# Patient Record
Sex: FEMALE | Race: WHITE | NOT HISPANIC OR LATINO | Employment: OTHER | URBAN - METROPOLITAN AREA
[De-identification: names, ages, dates, MRNs, and addresses within clinical notes are randomized per-mention and may not be internally consistent; named-entity substitution may affect disease eponyms.]

---

## 2017-03-02 ENCOUNTER — HOSPITAL ENCOUNTER (EMERGENCY)
Facility: HOSPITAL | Age: 82
Discharge: HOME | End: 2017-03-02
Attending: EMERGENCY MEDICINE
Payer: MEDICARE

## 2017-03-02 VITALS
TEMPERATURE: 98 F | HEIGHT: 65 IN | DIASTOLIC BLOOD PRESSURE: 66 MMHG | HEART RATE: 74 BPM | RESPIRATION RATE: 16 BRPM | WEIGHT: 110 LBS | SYSTOLIC BLOOD PRESSURE: 144 MMHG | BODY MASS INDEX: 18.33 KG/M2 | OXYGEN SATURATION: 96 %

## 2017-03-02 DIAGNOSIS — K64.4 EXTERNAL HEMORRHOID, BLEEDING: ICD-10-CM

## 2017-03-02 DIAGNOSIS — K62.5 BRBPR (BRIGHT RED BLOOD PER RECTUM): Primary | ICD-10-CM

## 2017-03-02 LAB
ALBUMIN SERPL BCP-MCNC: 3.9 G/DL
ALP SERPL-CCNC: 64 U/L
ALT SERPL W/O P-5'-P-CCNC: 13 U/L
ANION GAP SERPL CALC-SCNC: 9 MMOL/L
AST SERPL-CCNC: 26 U/L
BASOPHILS # BLD AUTO: 0.04 K/UL
BASOPHILS NFR BLD: 0.6 %
BILIRUB SERPL-MCNC: 0.3 MG/DL
BUN SERPL-MCNC: 16 MG/DL
CALCIUM SERPL-MCNC: 9.8 MG/DL
CHLORIDE SERPL-SCNC: 103 MMOL/L
CO2 SERPL-SCNC: 26 MMOL/L
CREAT SERPL-MCNC: 1 MG/DL
DIFFERENTIAL METHOD: NORMAL
EOSINOPHIL # BLD AUTO: 0.2 K/UL
EOSINOPHIL NFR BLD: 2.5 %
ERYTHROCYTE [DISTWIDTH] IN BLOOD BY AUTOMATED COUNT: 13 %
EST. GFR  (AFRICAN AMERICAN): 59 ML/MIN/1.73 M^2
EST. GFR  (NON AFRICAN AMERICAN): 51 ML/MIN/1.73 M^2
GLUCOSE SERPL-MCNC: 94 MG/DL
HCT VFR BLD AUTO: 44.7 %
HGB BLD-MCNC: 15.3 G/DL
INR PPP: 1
LYMPHOCYTES # BLD AUTO: 1.8 K/UL
LYMPHOCYTES NFR BLD: 28.5 %
MCH RBC QN AUTO: 29.5 PG
MCHC RBC AUTO-ENTMCNC: 34.2 %
MCV RBC AUTO: 86 FL
MONOCYTES # BLD AUTO: 0.7 K/UL
MONOCYTES NFR BLD: 10.2 %
NEUTROPHILS # BLD AUTO: 3.7 K/UL
NEUTROPHILS NFR BLD: 58 %
PLATELET # BLD AUTO: 233 K/UL
PMV BLD AUTO: 9.3 FL
POTASSIUM SERPL-SCNC: 3.8 MMOL/L
PROT SERPL-MCNC: 7.9 G/DL
PROTHROMBIN TIME: 10.3 SEC
RBC # BLD AUTO: 5.18 M/UL
SODIUM SERPL-SCNC: 138 MMOL/L
WBC # BLD AUTO: 6.36 K/UL

## 2017-03-02 PROCEDURE — 85610 PROTHROMBIN TIME: CPT

## 2017-03-02 PROCEDURE — 80053 COMPREHEN METABOLIC PANEL: CPT

## 2017-03-02 PROCEDURE — 99283 EMERGENCY DEPT VISIT LOW MDM: CPT

## 2017-03-02 PROCEDURE — 85025 COMPLETE CBC W/AUTO DIFF WBC: CPT

## 2017-03-02 RX ORDER — DOCUSATE SODIUM 100 MG/1
100 CAPSULE, LIQUID FILLED ORAL DAILY
Qty: 30 CAPSULE | Refills: 0 | Status: SHIPPED | OUTPATIENT
Start: 2017-03-02 | End: 2017-04-01

## 2017-03-02 RX ORDER — HYDROCHLOROTHIAZIDE 25 MG/1
25 TABLET ORAL DAILY
Status: ON HOLD | COMMUNITY
End: 2020-02-23 | Stop reason: HOSPADM

## 2017-03-02 NOTE — ED PROVIDER NOTES
"Encounter Date: 3/2/2017    SCRIBE #1 NOTE: I, Valerie Oviedo, am scribing for, and in the presence of,  Andres Lopez MD. I have scribed the following portions of the note - Other sections scribed: HPI, ROS.       History     Chief Complaint   Patient presents with    Rectal Bleeding     " I have been having bleeding w/ my bm since yesterday." Light red. Denies pain, n/v      Review of patient's allergies indicates:  No Known Allergies  HPI Comments: CC: Rectal Bleeding     HPI: This patient is a 86 y.o. F with no medical history who presents to ED c/o rectal bleeding since yesterday. Patient is unsure whether bleeding is actually rectal and thinks that it could be vaginal bleeding. Blood appears on tissue when she wipes. Patient denies rectal pain, vaginal pain, dizziness, weakness, nausea, constipation, and diarrhea. Denies history of prior episode. No known allergies.     The history is provided by the patient and a relative. No  was used.     No past medical history on file.  No past surgical history on file.  No family history on file.  Social History   Substance Use Topics    Smoking status: Not on file    Smokeless tobacco: Not on file    Alcohol use Not on file     Review of Systems   Constitutional: Negative for fever.   HENT: Negative for sore throat.    Respiratory: Negative for shortness of breath.    Cardiovascular: Negative for chest pain.   Gastrointestinal: Positive for anal bleeding. Negative for constipation, diarrhea, nausea and vomiting.   Genitourinary: Positive for vaginal bleeding. Negative for dysuria and vaginal pain.   Musculoskeletal: Negative for back pain.   Skin: Negative for rash.   Neurological: Negative for dizziness and weakness.   Hematological: Does not bruise/bleed easily.       Physical Exam   Initial Vitals   BP Pulse Resp Temp SpO2   03/02/17 1256 03/02/17 1256 03/02/17 1256 03/02/17 1256 03/02/17 1256   151/69 78 16 98.4 °F (36.9 °C) 96 % "     Physical Exam  Nursing note and vitals reviewed.  Constitutional: Nontoxic appearing elderly female in no obvious distress.  HENT:    Head: NC/AT    Eyes: Conjunctivae normal.   (-) scleral icterus              Mouth/Throat: MMM.     Neck: Neck supple, normal rom.   Cardiovascular: RRR   Pulmonary/Chest: CTAB   Abdominal: Soft. ND/NT w/o guarding or rebound.   (-) CVA tenderness.  Rectal:  Small bleeding external hemorrhoids.   (-) Anal fissures  Musculoskeletal: FROM of all major joints. No extremity edema or tenderness.  Neurological: A&Ox4, Normal speech.  No focal motor deficits.  Normal gait  Skin: Skin is warm and dry.   Psychiatric: normal mood and affect.      ED Course   Procedures  Labs Reviewed   CBC W/ AUTO DIFFERENTIAL   PROTIME-INR   COMPREHENSIVE METABOLIC PANEL             Medical Decision Making:   History:   I obtained history from: someone other than patient.  Old Medical Records: I decided to obtain old medical records.  Clinical Tests:   Lab Tests: Reviewed and Ordered    Differential diagnosis:   Initial differential diagnosis includes but is not limited to... coagulopathy, lower gi bleed including hemorrhoids (internal vs external), anal fissure, diverticulosis, diverticulitis, AV malformation; upper GI bleeding including gastritis, peptic ulcer disease, esophageal varices.     Additional Medical Decision Making:   Urgent evaluation of an 86-year-old female with history of hypertension who presents emergent department complaining of bright red blood per rectum for the past 24 hours.  She denies any other complaints including generalized weakness, dizziness, nausea/vomiting, abdominal pain, and/or urinary symptoms.   On evaluation, her abdomen is soft and nontender.  She has a small bleeding external hemorrhoid.  VS reassuring.  Basic labs wnls including an H&H 15.3/44.3.  She has been placed on Colace and has been advised to follow-up with both her primary care physician as well as  gastroenterology ASAP.  Return instructions discussed prior to discharge.              Scribe Attestation:   Scribe #1: I performed the above scribed service and the documentation accurately describes the services I performed. I attest to the accuracy of the note.    Attending Attestation:           Physician Attestation for Scribe:  Physician Attestation Statement for Scribe #1: I, Andres Lopez MD, reviewed documentation, as scribed by Valerie Oviedo in my presence, and it is both accurate and complete.                 ED Course     Clinical Impression:   The primary encounter diagnosis was BRBPR (bright red blood per rectum). A diagnosis of External hemorrhoid, bleeding was also pertinent to this visit.    Disposition:   Disposition: Discharged       Andres Lopez MD  03/02/17 8188

## 2017-03-02 NOTE — ED TRIAGE NOTES
Arrive via personal transportation. Pt states she is bleeding from her vagina. Started yesterday, no pain.

## 2020-02-22 ENCOUNTER — HOSPITAL ENCOUNTER (OUTPATIENT)
Facility: HOSPITAL | Age: 85
Discharge: HOME | End: 2020-02-23
Attending: EMERGENCY MEDICINE | Admitting: FAMILY MEDICINE
Payer: MEDICARE

## 2020-02-22 DIAGNOSIS — R55 SYNCOPE: ICD-10-CM

## 2020-02-22 DIAGNOSIS — F03.91 DEMENTIA WITH BEHAVIORAL DISTURBANCE, UNSPECIFIED DEMENTIA TYPE: Primary | ICD-10-CM

## 2020-02-22 DIAGNOSIS — R74.8 ELEVATED LIVER ENZYMES: ICD-10-CM

## 2020-02-22 DIAGNOSIS — D69.6 THROMBOCYTOPENIA: ICD-10-CM

## 2020-02-22 DIAGNOSIS — R56.9 SEIZURE-LIKE ACTIVITY: ICD-10-CM

## 2020-02-22 LAB
BACTERIA #/AREA URNS HPF: ABNORMAL /HPF
BASOPHILS # BLD AUTO: 0.04 K/UL (ref 0–0.2)
BASOPHILS NFR BLD: 0.7 % (ref 0–1.9)
BILIRUB UR QL STRIP: NEGATIVE
CLARITY UR: ABNORMAL
COLOR UR: YELLOW
DIFFERENTIAL METHOD: ABNORMAL
EOSINOPHIL # BLD AUTO: 0.1 K/UL (ref 0–0.5)
EOSINOPHIL NFR BLD: 2 % (ref 0–8)
ERYTHROCYTE [DISTWIDTH] IN BLOOD BY AUTOMATED COUNT: 13.7 % (ref 11.5–14.5)
GLUCOSE UR QL STRIP: NEGATIVE
HCT VFR BLD AUTO: 37.4 % (ref 37–48.5)
HGB BLD-MCNC: 12.3 G/DL (ref 12–16)
HGB UR QL STRIP: ABNORMAL
HYALINE CASTS #/AREA URNS LPF: 0 /LPF
IMM GRANULOCYTES # BLD AUTO: 0.02 K/UL (ref 0–0.04)
IMM GRANULOCYTES NFR BLD AUTO: 0.3 % (ref 0–0.5)
KETONES UR QL STRIP: ABNORMAL
LEUKOCYTE ESTERASE UR QL STRIP: ABNORMAL
LYMPHOCYTES # BLD AUTO: 1.3 K/UL (ref 1–4.8)
LYMPHOCYTES NFR BLD: 22.1 % (ref 18–48)
MAGNESIUM SERPL-MCNC: 2 MG/DL (ref 1.6–2.6)
MCH RBC QN AUTO: 30.1 PG (ref 27–31)
MCHC RBC AUTO-ENTMCNC: 32.9 G/DL (ref 32–36)
MCV RBC AUTO: 92 FL (ref 82–98)
MICROSCOPIC COMMENT: ABNORMAL
MONOCYTES # BLD AUTO: 0.6 K/UL (ref 0.3–1)
MONOCYTES NFR BLD: 10 % (ref 4–15)
NEUTROPHILS # BLD AUTO: 3.8 K/UL (ref 1.8–7.7)
NEUTROPHILS NFR BLD: 64.9 % (ref 38–73)
NITRITE UR QL STRIP: NEGATIVE
NRBC BLD-RTO: 0 /100 WBC
PH UR STRIP: 6 [PH] (ref 5–8)
PLATELET # BLD AUTO: 86 K/UL (ref 150–350)
PMV BLD AUTO: 10.6 FL (ref 9.2–12.9)
PROT UR QL STRIP: ABNORMAL
RBC # BLD AUTO: 4.08 M/UL (ref 4–5.4)
RBC #/AREA URNS HPF: 30 /HPF (ref 0–4)
SP GR UR STRIP: 1.02 (ref 1–1.03)
SQUAMOUS #/AREA URNS HPF: 4 /HPF
TROPONIN I SERPL DL<=0.01 NG/ML-MCNC: 0.02 NG/ML (ref 0–0.03)
URN SPEC COLLECT METH UR: ABNORMAL
UROBILINOGEN UR STRIP-ACNC: ABNORMAL EU/DL
WBC # BLD AUTO: 5.92 K/UL (ref 3.9–12.7)
WBC #/AREA URNS HPF: 4 /HPF (ref 0–5)

## 2020-02-22 PROCEDURE — 80053 COMPREHEN METABOLIC PANEL: CPT

## 2020-02-22 PROCEDURE — 81000 URINALYSIS NONAUTO W/SCOPE: CPT

## 2020-02-22 PROCEDURE — 93010 ELECTROCARDIOGRAM REPORT: CPT | Mod: ,,, | Performed by: INTERNAL MEDICINE

## 2020-02-22 PROCEDURE — 96361 HYDRATE IV INFUSION ADD-ON: CPT

## 2020-02-22 PROCEDURE — G0378 HOSPITAL OBSERVATION PER HR: HCPCS

## 2020-02-22 PROCEDURE — 63600175 PHARM REV CODE 636 W HCPCS: Performed by: EMERGENCY MEDICINE

## 2020-02-22 PROCEDURE — 84484 ASSAY OF TROPONIN QUANT: CPT

## 2020-02-22 PROCEDURE — 85025 COMPLETE CBC W/AUTO DIFF WBC: CPT

## 2020-02-22 PROCEDURE — 99285 EMERGENCY DEPT VISIT HI MDM: CPT | Mod: 25

## 2020-02-22 PROCEDURE — 83735 ASSAY OF MAGNESIUM: CPT

## 2020-02-22 PROCEDURE — 93005 ELECTROCARDIOGRAM TRACING: CPT

## 2020-02-22 PROCEDURE — 93010 EKG 12-LEAD: ICD-10-PCS | Mod: ,,, | Performed by: INTERNAL MEDICINE

## 2020-02-22 PROCEDURE — 96360 HYDRATION IV INFUSION INIT: CPT

## 2020-02-22 RX ADMIN — SODIUM CHLORIDE 1000 ML: 0.9 INJECTION, SOLUTION INTRAVENOUS at 07:02

## 2020-02-23 VITALS
TEMPERATURE: 97 F | RESPIRATION RATE: 18 BRPM | DIASTOLIC BLOOD PRESSURE: 74 MMHG | HEIGHT: 64 IN | SYSTOLIC BLOOD PRESSURE: 124 MMHG | WEIGHT: 97.44 LBS | HEART RATE: 58 BPM | OXYGEN SATURATION: 97 % | BODY MASS INDEX: 16.64 KG/M2

## 2020-02-23 PROBLEM — F03.90 DEMENTIA: Status: ACTIVE | Noted: 2020-02-23

## 2020-02-23 LAB
ALBUMIN SERPL BCP-MCNC: 3.4 G/DL (ref 3.5–5.2)
ALP SERPL-CCNC: 111 U/L (ref 55–135)
ALT SERPL W/O P-5'-P-CCNC: 32 U/L (ref 10–44)
ANION GAP SERPL CALC-SCNC: 11 MMOL/L (ref 8–16)
AST SERPL-CCNC: 45 U/L (ref 10–40)
BILIRUB SERPL-MCNC: 0.6 MG/DL (ref 0.1–1)
BUN SERPL-MCNC: 19 MG/DL (ref 8–23)
CALCIUM SERPL-MCNC: 9 MG/DL (ref 8.7–10.5)
CHLORIDE SERPL-SCNC: 104 MMOL/L (ref 95–110)
CK SERPL-CCNC: 22 U/L (ref 20–180)
CO2 SERPL-SCNC: 25 MMOL/L (ref 23–29)
CREAT SERPL-MCNC: 1.1 MG/DL (ref 0.5–1.4)
EST. GFR  (AFRICAN AMERICAN): 52 ML/MIN/1.73 M^2
EST. GFR  (NON AFRICAN AMERICAN): 45 ML/MIN/1.73 M^2
GLUCOSE SERPL-MCNC: 130 MG/DL (ref 70–110)
POTASSIUM SERPL-SCNC: 4 MMOL/L (ref 3.5–5.1)
PROT SERPL-MCNC: 6.5 G/DL (ref 6–8.4)
SODIUM SERPL-SCNC: 140 MMOL/L (ref 136–145)

## 2020-02-23 PROCEDURE — 99225 PR SUBSEQUENT OBSERVATION CARE,LEVEL II: CPT | Mod: ,,, | Performed by: PSYCHIATRY & NEUROLOGY

## 2020-02-23 PROCEDURE — S0166 INJ OLANZAPINE 2.5MG: HCPCS | Performed by: NURSE PRACTITIONER

## 2020-02-23 PROCEDURE — 94761 N-INVAS EAR/PLS OXIMETRY MLT: CPT

## 2020-02-23 PROCEDURE — 25000003 PHARM REV CODE 250: Performed by: NURSE PRACTITIONER

## 2020-02-23 PROCEDURE — G0378 HOSPITAL OBSERVATION PER HR: HCPCS

## 2020-02-23 PROCEDURE — 36415 COLL VENOUS BLD VENIPUNCTURE: CPT

## 2020-02-23 PROCEDURE — 99225 PR SUBSEQUENT OBSERVATION CARE,LEVEL II: ICD-10-PCS | Mod: ,,, | Performed by: PSYCHIATRY & NEUROLOGY

## 2020-02-23 PROCEDURE — 96372 THER/PROPH/DIAG INJ SC/IM: CPT

## 2020-02-23 PROCEDURE — 82550 ASSAY OF CK (CPK): CPT

## 2020-02-23 RX ORDER — OLANZAPINE 10 MG/2ML
5 INJECTION, POWDER, FOR SOLUTION INTRAMUSCULAR ONCE
Status: COMPLETED | OUTPATIENT
Start: 2020-02-23 | End: 2020-02-23

## 2020-02-23 RX ORDER — SODIUM CHLORIDE 0.9 % (FLUSH) 0.9 %
10 SYRINGE (ML) INJECTION
Status: DISCONTINUED | OUTPATIENT
Start: 2020-02-23 | End: 2020-02-23 | Stop reason: HOSPADM

## 2020-02-23 RX ADMIN — OLANZAPINE 5 MG: 10 INJECTION, POWDER, FOR SOLUTION INTRAMUSCULAR at 09:02

## 2020-02-23 NOTE — PLAN OF CARE
Problem: Fall Injury Risk  Goal: Absence of Fall and Fall-Related Injury  Intervention: Identify and Manage Contributors to Fall Injury Risk  Flowsheets (Taken 2/23/2020 0633)  Self-Care Promotion: independence encouraged  Medication Review/Management: medications reviewed  Intervention: Promote Injury-Free Environment  Flowsheets (Taken 2/23/2020 0633)  Safety Promotion/Fall Prevention: bed alarm set; family to remain at bedside; side rails raised x 2; room near unit station; instructed to call staff for mobility  Environmental Safety Modification: clutter free environment maintained; room organization consistent; room near unit station     Problem: Adult Inpatient Plan of Care  Goal: Plan of Care Review  Flowsheets (Taken 2/23/2020 0633)  Plan of Care Reviewed With: patient; family (niece)  Goal: Absence of Hospital-Acquired Illness or Injury  Intervention: Identify and Manage Fall Risk  Flowsheets (Taken 2/23/2020 0633)  Safety Promotion/Fall Prevention: bed alarm set; family to remain at bedside; side rails raised x 2; room near unit station; instructed to call staff for mobility  Intervention: Prevent VTE (venous thromboembolism)  Flowsheets (Taken 2/23/2020 0633)  VTE Prevention/Management: ROM (passive) performed  Goal: Optimal Comfort and Wellbeing  Intervention: Provide Person-Centered Care  Flowsheets (Taken 2/23/2020 0633)  Trust Relationship/Rapport: thoughts/feelings acknowledged     Problem: Thought Process Alteration  Goal: Optimal Thought Clarity  Intervention: Minimize Safety Risk and Altered Thought  Flowsheets (Taken 2/23/2020 0633)  Reorientation Measures: calendar in view; clock in view; reorientation provided  Sensory Stimulation Regulation: care clustered; lighting decreased; music/television provided for relaxation  Safety/Security Measures: bed alarm set

## 2020-02-23 NOTE — ED TRIAGE NOTES
Pt arrives with c/o confusion. No family at bedside. Pt is disoriented x4,, cant tell me her name, , situation, place,year. Pt denies headache, dizziness, pain.

## 2020-02-23 NOTE — PROGRESS NOTES
[facility] CASE MANAGEMENT                  WRITTEN DISCHARGE INFORMATION      APPOINTMENTS AND RESOURCES TO HELP YOU MANAGE YOUR CARE AT HOME BASED ON YOUR PREFERENCES:  (If an appointment is not scheduled for you when you leave the hospital, call your doctor to schedule a follow up visit within a week)    Follow-up Information     Thai Her MD.    Specialty:  General Surgery  Why:  Please call to schedule your PCP appt in 1 week   Contact information:  [address]  [facility] [address]  [phone]                   Healthy Living Instructions to HELP MANAGE YOUR CARE AT HOME:  Things You are responsible for:  1.    Getting your prescriptions filled   2.    Taking your medications as directed, DO NOT MISS ANY DOSES!  3.    Following the diet and exercise recommended by your doctor  4.    Going to your follow-up doctor appointment. This is important because it allows the doctor to monitor your progress and determine if any changes need to made to your treatment plan.  5. If you have any questions about MANAGING YOUR CARE AT HOME Call the Nurse Care Line for 24/7 Assistance [phone]       Please answer any calls you may receive from [facility]. We want to continue to support you as you manage your healthcare needs. [facility] is happy to have the opportunity to serve you.      Thank you for choosing [facility] for your healthcare needs!  Your [facility] Case Management Team,

## 2020-02-23 NOTE — ASSESSMENT & PLAN NOTE
Obtain EEG in the morning.  Check orthostatics Q shift.  Obtain cardiac echo.  Obtain frequent neurological checks.  Nothing focal on exam right now and labs are unrevealing EKG is nonischemic.  Observe overnight on telemetry monitoring.

## 2020-02-23 NOTE — ASSESSMENT & PLAN NOTE
At baseline dementia is moderate to advanced patient is still able to ambulate but does not recognize her family members anymore.

## 2020-02-23 NOTE — ED PROVIDER NOTES
Encounter Date: 2/22/2020    SCRIBE #1 NOTE: I, Kate Anders, am scribing for, and in the presence of,  Valeriano Ewing MD. I have scribed the following portions of the note - Other sections scribed: HPI,ROS,PE.       History     Chief Complaint   Patient presents with    Loss of Consciousness     EMS reports pt had a syncopal episode just pta witnessed by family. no trauma. pt alert and confused at this time.      CC: Loss of Consciousness    HPI: This is a 89 y.o.female patient, with no PMHx, presenting to the ED with a complaint of loss of consciousness, that occurred earlier today. Per EMS, patient had a witnessed syncopal episode by family. No family present at bedside. No known drug allergies.    Per relative, patient had similar symptoms 2 weeks ago. They report patient has a history of seizure like activity.    HPI limited since patient cannot participate in history taking.    The history is provided by the EMS personnel.     Review of patient's allergies indicates:  No Known Allergies  Past Medical History:   Diagnosis Date    Dementia      History reviewed. No pertinent surgical history.  History reviewed. No pertinent family history.  Social History     Tobacco Use    Smoking status: Former Smoker   Substance Use Topics    Alcohol use: Not Currently    Drug use: Not Currently     Review of Systems   Unable to perform ROS: Dementia       Physical Exam     Initial Vitals [02/22/20 1859]   BP Pulse Resp Temp SpO2   118/74 68 16 98.8 °F (37.1 °C) (!) 93 %      MAP       --         Physical Exam  The patient was examined specifically for the following:   General:No significant distress, Good color, Warm and dry. Head and neck:Scalp atraumatic, Neck supple. Neurological:Appropriate conversation, Gross motor deficits. Eyes:Conjugate gaze, Clear corneas. ENT: No epistaxis. Cardiac: Regular rate and rhythm, Grossly normal heart tones. Pulmonary: Wheezing, Rales. Gastrointestinal: Abdominal tenderness,  Abdominal distention. Musculoskeletal: Extremity deformity, Apparent pain with range of motion of the joints. Skin: Rash.   The findings on examination were normal except for the following:  This patient appears to be cheerfully demented.  Cranial nerves motor and sensory examination appeared to be more normal. The lungs are clear.  The heart tones are normal.  The abdomen is soft.  Oxygen saturations are 93% on room air on arrival.  ED Course   Procedures  Labs Reviewed   COMPREHENSIVE METABOLIC PANEL - Abnormal; Notable for the following components:       Result Value    Glucose 130 (*)     Albumin 3.4 (*)     AST 45 (*)     eGFR if  52 (*)     eGFR if non  45 (*)     All other components within normal limits   CBC W/ AUTO DIFFERENTIAL - Abnormal; Notable for the following components:    Platelets 86 (*)     All other components within normal limits   URINALYSIS - Abnormal; Notable for the following components:    Appearance, UA Hazy (*)     Protein, UA 1+ (*)     Ketones, UA 1+ (*)     Occult Blood UA 3+ (*)     Urobilinogen, UA 4.0-6.0 (*)     Leukocytes, UA 1+ (*)     All other components within normal limits   URINALYSIS MICROSCOPIC - Abnormal; Notable for the following components:    RBC, UA 30 (*)     All other components within normal limits   TROPONIN I   MAGNESIUM     EKG Readings: (Independently Interpreted)   This patient is in a normal sinus rhythm with a heart rate of 62.  There are occasional PVCs.  There are nonspecific ST segment and T-wave changes.  The patient has borderline left axis deviation.  There is poor R-wave progression across the precordium.  There are diffuse T-wave changes.  There are some nonspecific ST segment changes.  There is no evidence of acute myocardial infarction or malignant arrhythmia.     ECG Results          EKG 12-lead (Final result)  Result time 02/23/20 15:05:15    Final result by Interface, Lab In [facility] (02/23/20 15:05:15)                  Narrative:    Test Reason : R55,    Vent. Rate : 062 BPM     Atrial Rate : 062 BPM     P-R Int : 156 ms          QRS Dur : 080 ms      QT Int : 418 ms       P-R-T Axes : 071 007 075 degrees     QTc Int : 424 ms    Sinus rhythm with occasional Premature ventricular complexes  Otherwise normal ECG  When compared with ECG of 30-OCT-2012 10:44,  Significant changes have occurred  Confirmed by Wilver Ndiaye MD ([id number]) on 2/23/2020 3:05:02 PM    Referred By: ADITI   SELF           Confirmed By:Wilver Ndiaye MD                            Imaging Results          CT Head Without Contrast (Final result)  Result time 02/22/20 22:02:28    Final result by Robyn Buchanan MD (02/22/20 22:02:28)                 Impression:      No acute intracranial abnormality detected.  Moderate cerebral atrophy and chronic small vessel ischemic changes.      Electronically signed by: Robyn Buchanan  Date:    02/22/2020  Time:    22:02             Narrative:    EXAMINATION:  CT OF THE HEAD WITHOUT    CLINICAL HISTORY:  Unspecified convulsionsSeizures new or progressive;    TECHNIQUE:  5 mm unenhanced axial images were obtained from the skull base to the vertex.    COMPARISON:  10/30/2012    FINDINGS:  The ventricles, basal cisterns, and cortical sulci are within normal limits for patient's stated age. There is no acute intracranial hemorrhage, territorial infarct or mass effect, or midline shift.  There is dilatation of the temporal horns.  In the visualized paranasal sinuses, there is mild left sphenoid sinus mucoperiosteal thickening.                              Medical decision making:  Given the above this patient presents to the emergency room with a history of syncope versus seizure today.  The patient has similar event 2 weeks ago.  I have not heard the event described by family member.  They are not here now.  The patient is apparently at neurologic baseline.  I believe she has dementia.   Given the above laboratory  evaluation is essentially unremarkable.  I wait the results of the CT scan of the head.  I will sign this interpretation out to .   The patient is alert and bright and in no distress. I would not treat for seizures.  It is unclear that that is what is happening.  I will refer the patient to follow up with primary care.                Scribe Attestation:   Scribe #1: I performed the above scribed service and the documentation accurately describes the services I performed. I attest to the accuracy of the note.            ED Course as of Feb 25 1732   Sat Feb 22, 2020   1786 Discussed the patient's presentation, exam, and workup with Dr. Larry. The patient will be placed in observation.    [LP]      ED Course User Index  [LP] Anibal Pierre III, MD                Clinical Impression:       ICD-10-CM ICD-9-CM   1. Dementia with behavioral disturbance, unspecified dementia type F03.91 294.21   2. Syncope R55 780.2   3. Seizure-like activity R56.9 780.39              I personally performed the services described in this documentation.  All medical record  entries made by the scribe are at my direction and in my presence.  Signed, Dr. Kaylin Ewing MD  02/22/20 1098       Valeriano Ewing MD  02/25/20 0929

## 2020-02-23 NOTE — DISCHARGE SUMMARY
[facility] Hospital Medicine  Discharge Summary      Patient Name: Amena Wick  MRN: [id number]  Admission Date: 2/22/2020  Hospital Length of Stay: 0 days  Discharge Date and Time:  02/23/2020 4:06 PM  Attending Physician: No att. providers found   Discharging Provider: Agusto Deutsch NP  Primary Care Provider: Thai Her MD      HPI:   89-year-old pleasantly demented at female otherwise healthy had a witnessed syncopal episode when she was getting up to go to the kitchen with family members who caught her before she hit the floor.  She had loss of urine and bowel during this episode.  Patient cannot provide any history does not recall the events however history sounds more recent for possible seizure.  Patient has no history of seizure.  She has no history of orthostatic hypotension.  She has not been recently ill per family members no nausea vomiting diarrhea.  No fevers.  Patient be referred for admission for syncopal episode.  Patient has no complaints but is very pleasantly demented.    * No surgery found *      Hospital Course:   Mr. Wick is a 89 yo female who was placed in observation for syncope. By history, patient felt weakness prior to syncope. CT no acute abnormalities. Patient remained at baseline. Patient climbed out of bed and ambulated in room with no issue. Per Niece Luis, patient is oriented to self only and does not recognize family member and at times aggressive verbally and physically. Per Niece, patient if full code at this time. Patient stable for discharge home.     Consults:   Consults (From admission, onward)        Status Ordering Provider     Inpatient consult to Neurology  Once     Provider:  Javan eBss MD    Completed AGUSTO DEUTSCH          No new Assessment & Plan notes have been filed under this hospital service since the last note was generated.  Service: Hospital Medicine    Final Active Diagnoses:    Diagnosis Date Noted POA     PRINCIPAL PROBLEM:  Syncope [R55] 02/22/2020 Yes    Dementia [F03.90] 02/23/2020 Yes      Problems Resolved During this Admission:       Discharged Condition: good    Disposition: Home or Self Care    Follow Up:  Follow-up Information     Thai Her MD.    Specialty:  General Surgery  Why:  Please call to schedule your PCP appt in 1 week   Contact information:  [address]  [facility] [facility] [address]  [phone]                 Patient Instructions:      Diet Adult Regular     Notify your health care provider if you experience any of the following:  temperature >100.4     Notify your health care provider if you experience any of the following:  difficulty breathing or increased cough     Notify your health care provider if you experience any of the following:  increased confusion or weakness     Activity as tolerated       Significant Diagnostic Studies: Labs:   BMP:   Recent Labs   Lab 02/22/20 1932   *      K 4.0      CO2 25   BUN 19   CREATININE 1.1   CALCIUM 9.0   MG 2.0    and All labs within the past 24 hours have been reviewed    Pending Diagnostic Studies:     Procedure Component Value Units Date/Time    Echo Color Flow Doppler? Yes [[id number]]  (Abnormal) Resulted:  02/23/20 1126    Order Status:  Sent Lab Status:  In process Updated:  02/23/20 1126     BSA 1.41 m2      TDI SEPTAL 0.07 m/s      LV LATERAL E/E' RATIO 8.44 m/s      LV SEPTAL E/E' RATIO 10.86 m/s      LA WIDTH 3.91 cm      AORTIC VALVE CUSP SEPERATION 1.99 cm      TDI LATERAL 0.09 m/s      PV PEAK VELOCITY 0.88 cm/s      LVIDD 4.34 cm      IVS 1.15 cm      PW 0.84 cm      Ao root annulus 2.84 cm      LVIDS 3.03 cm      FS 30 %      LA volume 70.89 cm3      Sinus 3.11 cm      STJ 3.01 cm      LV mass 143.61 g      LA size 4.20 cm      RVDD 3.00 cm      TAPSE 1.74 cm      RV S' 13.40 cm/s      Left Ventricle Relative Wall Thickness 0.39 cm      AV mean gradient 4 mmHg      AV valve area  2.86 cm2      AV Velocity Ratio 0.68     AV index (prosthetic) 0.62     E/A ratio 1.46     Mean e' 0.08 m/s      E wave decelartion time 231.36 msec      IVRT 0.09 msec      Pulm vein S/D ratio 1.17     LVOT diameter 2.42 cm      LVOT area 4.6 cm2      LVOT peak elia 0.97 m/s      LVOT peak VTI 17.76 cm      Ao peak elia 1.43 m/s      Ao VTI 28.55 cm      LVOT stroke volume 81.65 cm3      AV peak gradient 8 mmHg      E/E' ratio 9.50 m/s      MV Peak E Elia 0.76 m/s      TR Max Elia 3.30 m/s      MV Peak A Elia 0.52 m/s      PV Peak S Elia 0.76 m/s      PV Peak D Elia 0.65 m/s      LV Systolic Volume 35.72 mL      LV Systolic Volume Index 24.8 mL/m2      LV Diastolic Volume 85.12 mL      LV Diastolic Volume Index 59.07 mL/m2      LA Volume Index 49.2 mL/m2      LV Mass Index 100 g/m2      RA Major Axis 4.79 cm      Left Atrium Minor Axis 4.90 cm      Left Atrium Major Axis 5.27 cm      Triscuspid Valve Regurgitation Peak Gradient 44 mmHg      RA Width 3.42 cm          Medications:  Reconciled Home Medications:      Medication List      CONTINUE taking these medications    multivitamin capsule  Take 1 capsule by mouth once daily.        STOP taking these medications    hydroCHLOROthiazide 25 MG tablet  Commonly known as:  HYDRODIURIL            Indwelling Lines/Drains at time of discharge:   Lines/Drains/Airways     None                 Time spent on the discharge of patient: 30 minutes  Patient was seen and examined on the date of discharge and determined to be suitable for discharge.         Zelda Thacker NP  Department of Hospital Medicine  [facility]

## 2020-02-23 NOTE — PLAN OF CARE
"TN spoke with patient's niece Dagmar via phone to complete discharge needs assessment. TN explained duties of case management to Dagmar.  TN reviewed  "[facility]", "Discharge Planning Begins on Admission" and discussed "Help at Home". Dagmar stated patient lives with her niece Pamela who assists patient at home. Patient usually needs assistance with cooking and cleaning. Patient will discharge back home when medically stable for discharge. TN also discussed her responsibilities to manage her health at home. Patient was informed to leave folder at bedside during hospital stay. Contact information added to white board.       02/23/20 7469   Discharge Assessment   Assessment Type Discharge Planning Assessment   Confirmed/corrected address and phone number on facesheet? Yes   Assessment information obtained from? Other   Prior to hospitilization cognitive status: Unable to Assess   Prior to hospitalization functional status: Independent   Current cognitive status: Unable to Assess   Current Functional Status: Independent   Lives With other (see comments)  (Casi )   Able to Return to Prior Arrangements yes   Is patient able to care for self after discharge? Yes   Who are your caregiver(s) and their phone number(s)? Pamela- casi    Patient's perception of discharge disposition home or selfcare   Readmission Within the Last 30 Days no previous admission in last 30 days   Patient currently being followed by outpatient case management? No   Patient currently receives any other outside agency services? No   Equipment Currently Used at Home none   Do you have any problems affording any of your prescribed medications? No   Is the patient taking medications as prescribed? yes   Does the patient have transportation home? Yes   Transportation Anticipated family or friend will provide   Does the patient receive services at the [facility]? No   Discharge Plan A Home;Home with family   Discharge Plan B Home;Home with " family   Patient/Family in Agreement with Plan yes   Does the patient have transportation to healthcare appointments? Yes

## 2020-02-23 NOTE — PLAN OF CARE
02/23/20 1310   Final Note   Assessment Type Final Discharge Note   Anticipated Discharge Disposition Home   What phone number can be called within the next 1-3 days to see how you are doing after discharge?   ([phone])   Right Care Referral Info   Post Acute Recommendation No Care

## 2020-02-23 NOTE — NURSING TRANSFER
Nursing Transfer Note      2/23/2020     Transfer From: ED To: 328A    Transfer via stretcher    Transfer with cardiac monitoring    Transported by transport personnel    Medicines sent: none    Chart send with patient: Yes    Notified: patient niece at the bedside    Patient reassessed at: 245am    Upon arrival to floor, stretcher pulled into room and patient with one assist walked to bed from stretcher with some generalized weakness noted. Cardiac monitoring applied and vital signs obtained and can be found in flow sheets with complete patient assessment. Skin dry and intact with a 20g RFA PIV noted saline locked. No complaints of pain or signs of respiratory distress noted. Plan to continue neuro checks every 4 hours, 2D echo and EEG in the am, and await further input from the provider. Patient and niece updated on plan of care and verbalized understanding. Call light in reach and patient instructed to inform the nurse if anything is needed. Patient stable and will continue to be monitored.

## 2020-02-23 NOTE — H&P
[facility] Hospital Medicine  History & Physical    Patient Name: Amena Wick  MRN: [id number]  Admission Date: 2/22/2020  Attending Physician: Valeriano Ewing MD   Primary Care Provider: Thai Her MD         Patient information was obtained from relative(s), past medical records and ER records.     Subjective:     Principal Problem:Syncope    Chief Complaint:   Chief Complaint   Patient presents with    Loss of Consciousness     EMS reports pt had a syncopal episode just pta witnessed by family. no trauma. pt alert and confused at this time.         HPI: 89-year-old pleasantly demented at female otherwise healthy had a witnessed syncopal episode when she was getting up to go to the kitchen with family members who caught her before she hit the floor.  She had loss of urine and bowel during this episode.  Patient cannot provide any history does not recall the events however history sounds more recent for possible seizure.  Patient has no history of seizure.  She has no history of orthostatic hypotension.  She has not been recently ill per family members no nausea vomiting diarrhea.  No fevers.  Patient be referred for admission for syncopal episode.  Patient has no complaints but is very pleasantly demented.    History reviewed. No pertinent past medical history.    History reviewed. No pertinent surgical history.    Review of patient's allergies indicates:  No Known Allergies    No current facility-administered medications on file prior to encounter.      Current Outpatient Medications on File Prior to Encounter   Medication Sig    hydrochlorothiazide (HYDRODIURIL) 25 MG tablet Take 25 mg by mouth once daily.    multivitamin capsule Take 1 capsule by mouth once daily.     Family History     None        Tobacco Use    Smoking status: Current Some Day Smoker   Substance and Sexual Activity    Alcohol use: Not Currently    Drug use: Not Currently    Sexual activity: Not on file      Review of Systems   Unable to perform ROS: Dementia     Objective:     Vital Signs (Most Recent):  Temp: 98.8 °F (37.1 °C) (02/22/20 1859)  Pulse: 67 (02/22/20 2347)  Resp: (!) 23 (02/22/20 2347)  BP: (!) 127/58 (02/22/20 2347)  SpO2: 95 % (02/22/20 2347) Vital Signs (24h Range):  Temp:  [98.8 °F (37.1 °C)] 98.8 °F (37.1 °C)  Pulse:  [62-84] 67  Resp:  [16-31] 23  SpO2:  [93 %-95 %] 95 %  BP: (112-160)/(56-74) 127/58     Weight: 49.9 kg (110 lb)  Body mass index is 18.3 kg/m².    Physical Exam   Constitutional: She appears well-developed and well-nourished. No distress.   HENT:   Head: Normocephalic and atraumatic.   Nose: Nose normal.   Eyes: Pupils are equal, round, and reactive to light. Conjunctivae and EOM are normal.   Neck: Normal range of motion. Neck supple.   Cardiovascular: Normal rate, regular rhythm, normal heart sounds and intact distal pulses. Exam reveals no gallop and no friction rub.   No murmur heard.  Pulmonary/Chest: Effort normal and breath sounds normal. No stridor. No respiratory distress. She has no wheezes. She has no rales.   Abdominal: Soft. Bowel sounds are normal. She exhibits no distension. There is no tenderness. There is no rebound and no guarding.   Musculoskeletal: Normal range of motion. She exhibits no edema, tenderness or deformity.   Neurological: She is alert. No cranial nerve deficit or sensory deficit.   Skin: Skin is warm and dry. Capillary refill takes 2 to 3 seconds. No rash noted. No erythema. No pallor.   Psychiatric: She has a normal mood and affect. Her behavior is normal.         CRANIAL NERVES     CN III, IV, VI   Pupils are equal, round, and reactive to light.  Extraocular motions are normal.        Significant Labs: All pertinent labs within the past 24 hours have been reviewed.    Significant Imaging: I have reviewed and interpreted all pertinent imaging results/findings within the past 24 hours.    Assessment/Plan:     * Syncope    Obtain EEG in the morning.   Check orthostatics Q shift.  Obtain cardiac echo.  Obtain frequent neurological checks.  Nothing focal on exam right now and labs are unrevealing EKG is nonischemic.  Observe overnight on telemetry monitoring.    Dementia    At baseline dementia is moderate to advanced patient is still able to ambulate but does not recognize her family members anymore.    VTE Risk Mitigation (From admission, onward)    None             Edilma Larry MD  Department of Hospital Medicine   [facility]

## 2020-02-23 NOTE — NURSING
Pt discharged home.  Pt teaching and dc instructions given.  Pt's family member verbalized understanding.  IV and tele box removed.  Pt awaiting transport to family vehicle.  Bed locked and low.  Call light in reach.  Family at bedside.

## 2020-02-23 NOTE — HOSPITAL COURSE
Mr. Wick is a 89 yo female who was placed in observation for syncope. By history, patient felt weakness prior to syncope. CT no acute abnormalities. Patient remained at baseline. Per Vannesa Smith, patient is oriented to self only and does not recognize family member and at times aggressive verbally and physically. Per Vannesa, patient if full code at this time. Patient stable for discharge home.

## 2020-02-23 NOTE — SUBJECTIVE & OBJECTIVE
History reviewed. No pertinent past medical history.    History reviewed. No pertinent surgical history.    Review of patient's allergies indicates:  No Known Allergies    No current facility-administered medications on file prior to encounter.      Current Outpatient Medications on File Prior to Encounter   Medication Sig    hydrochlorothiazide (HYDRODIURIL) 25 MG tablet Take 25 mg by mouth once daily.    multivitamin capsule Take 1 capsule by mouth once daily.     Family History     None        Tobacco Use    Smoking status: Current Some Day Smoker   Substance and Sexual Activity    Alcohol use: Not Currently    Drug use: Not Currently    Sexual activity: Not on file     Review of Systems   Unable to perform ROS: Dementia     Objective:     Vital Signs (Most Recent):  Temp: 98.8 °F (37.1 °C) (02/22/20 1859)  Pulse: 67 (02/22/20 2347)  Resp: (!) 23 (02/22/20 2347)  BP: (!) 127/58 (02/22/20 2347)  SpO2: 95 % (02/22/20 2347) Vital Signs (24h Range):  Temp:  [98.8 °F (37.1 °C)] 98.8 °F (37.1 °C)  Pulse:  [62-84] 67  Resp:  [16-31] 23  SpO2:  [93 %-95 %] 95 %  BP: (112-160)/(56-74) 127/58     Weight: 49.9 kg (110 lb)  Body mass index is 18.3 kg/m².    Physical Exam   Constitutional: She appears well-developed and well-nourished. No distress.   HENT:   Head: Normocephalic and atraumatic.   Nose: Nose normal.   Eyes: Pupils are equal, round, and reactive to light. Conjunctivae and EOM are normal.   Neck: Normal range of motion. Neck supple.   Cardiovascular: Normal rate, regular rhythm, normal heart sounds and intact distal pulses. Exam reveals no gallop and no friction rub.   No murmur heard.  Pulmonary/Chest: Effort normal and breath sounds normal. No stridor. No respiratory distress. She has no wheezes. She has no rales.   Abdominal: Soft. Bowel sounds are normal. She exhibits no distension. There is no tenderness. There is no rebound and no guarding.   Musculoskeletal: Normal range of motion. She exhibits no  edema, tenderness or deformity.   Neurological: She is alert. No cranial nerve deficit or sensory deficit.   Skin: Skin is warm and dry. Capillary refill takes 2 to 3 seconds. No rash noted. No erythema. No pallor.   Psychiatric: She has a normal mood and affect. Her behavior is normal.         CRANIAL NERVES     CN III, IV, VI   Pupils are equal, round, and reactive to light.  Extraocular motions are normal.        Significant Labs: All pertinent labs within the past 24 hours have been reviewed.    Significant Imaging: I have reviewed and interpreted all pertinent imaging results/findings within the past 24 hours.

## 2020-02-23 NOTE — HPI
89-year-old pleasantly demented at female otherwise healthy had a witnessed syncopal episode when she was getting up to go to the kitchen with family members who caught her before she hit the floor.  She had loss of urine and bowel during this episode.  Patient cannot provide any history does not recall the events however history sounds more recent for possible seizure.  Patient has no history of seizure.  She has no history of orthostatic hypotension.  She has not been recently ill per family members no nausea vomiting diarrhea.  No fevers.  Patient be referred for admission for syncopal episode.  Patient has no complaints but is very pleasantly demented.

## 2020-02-23 NOTE — CONSULTS
[facility]  Neurology  Consult Note    Patient Name: Amena Wick  MRN: [id number]  Admission Date: 2/22/2020  Hospital Length of Stay: 0 days  Code Status: Full Code   Attending Provider: Marcia Barrios MD   Consulting Provider: Javan Bess MD  Primary Care Physician: Thai Her MD  Principal Problem:Syncope    Inpatient consult to Neurology  Consult performed by: Javan Bess MD  Consult ordered by: Zelda Thacker NP        Subjective:     Chief Complaint: Syncope     HPI: 89 y/o female with medical Hx as listed below was brought to ED after losing consciousness at home. Pt had been walking towards the kitchen when passed out. Pt did not sustain trauma as her niece was able to catch her before falling. Ms. Wick was out for around two minutes; defecated and urinated on herself. No previous episodes. Relative tells me that pt has advanced dementia and is only oriented to person, barely eat or ingests fluids. Pt is back at her baseline.    Past Medical History:   Diagnosis Date    Dementia        History reviewed. No pertinent surgical history.    Review of patient's allergies indicates:  No Known Allergies    Current Neurological Medications:     No current facility-administered medications on file prior to encounter.      Current Outpatient Medications on File Prior to Encounter   Medication Sig    hydrochlorothiazide (HYDRODIURIL) 25 MG tablet Take 25 mg by mouth once daily.    multivitamin capsule Take 1 capsule by mouth once daily.      Family History     None        Tobacco Use    Smoking status: Former Smoker   Substance and Sexual Activity    Alcohol use: Not Currently    Drug use: Not Currently    Sexual activity: Not on file     Review of Systems   Unable to perform ROS: Dementia     Objective:     Vital Signs (Most Recent):  Temp: 98 °F (36.7 °C) (02/23/20 0821)  Pulse: 60 (02/23/20 0821)  Resp: 18 (02/23/20 0821)  BP: 113/72 (02/23/20 0428)  SpO2:  95 % (02/23/20 0821) Vital Signs (24h Range):  Temp:  [97.3 °F (36.3 °C)-98.8 °F (37.1 °C)] 98 °F (36.7 °C)  Pulse:  [60-84] 60  Resp:  [16-31] 18  SpO2:  [93 %-96 %] 95 %  BP: (112-160)/(56-75) 113/72     Weight: 44.2 kg (97 lb 7.1 oz)  Body mass index is 16.73 kg/m².    Physical Exam   Constitutional: No distress.   HENT:   Head: Normocephalic.   Eyes: Right eye exhibits no discharge. Left eye exhibits no discharge.   Neck: Neck supple.   Cardiovascular: Regular rhythm.   Pulmonary/Chest: Breath sounds normal.   Abdominal: Bowel sounds are normal.   Musculoskeletal: She exhibits no edema.   Skin: She is not diaphoretic.       NEUROLOGICAL EXAMINATION:     MENTAL STATUS   Level of consciousness: alert       Nonverbal     CRANIAL NERVES     CN III, IV, VI   Right pupil: Size: 3 mm. Shape: regular.   Left pupil: Size: 3 mm. Shape: regular.   Nystagmus: none   Ophthalmoparesis: none  Conjugate gaze: present    CN VII   Right facial weakness: none  Left facial weakness: none    MOTOR EXAM        AROM of UE's and LE's against gravity       Significant Labs:   CBC:   Recent Labs   Lab 02/22/20 1932   WBC 5.92   HGB 12.3   HCT 37.4   PLT 86*     CMP:   Recent Labs   Lab 02/22/20 1932   *      K 4.0      CO2 25   BUN 19   CREATININE 1.1   CALCIUM 9.0   MG 2.0   PROT 6.5   ALBUMIN 3.4*   BILITOT 0.6   ALKPHOS 111   AST 45*   ALT 32   ANIONGAP 11   EGFRNONAA 45*       Significant Imaging: I have reviewed all pertinent imaging results/findings within the past 24 hours.   Head CT    FINDINGS:  The ventricles, basal cisterns, and cortical sulci are within normal limits for patient's stated age. There is no acute intracranial hemorrhage, territorial infarct or mass effect, or midline shift.  There is dilatation of the temporal horns.  In the visualized paranasal sinuses, there is mild left sphenoid sinus mucoperiosteal thickening.      Impression       No acute intracranial abnormality detected.  Moderate  cerebral atrophy and chronic small vessel ischemic changes.      Electronically signed by: Robyn Buchanan  Date: 02/22/2020  Time: 22:02            Assessment and Plan:     89 y/o female consulted for syncope    1. Syncope: could be orthostatic given the circumstances in which occurred and the fact that pt has poor oral intake.   Head CT shows no acute abnormalities.   -Will monitor for now. No new meds.   -OK to D/C home.    Active Diagnoses:    Diagnosis Date Noted POA    PRINCIPAL PROBLEM:  Syncope [R55] 02/22/2020 Yes    Dementia [F03.90] 02/23/2020 Yes      Problems Resolved During this Admission:       VTE Risk Mitigation (From admission, onward)         Ordered     IP VTE HIGH RISK PATIENT  Once      02/23/20 0255     Place MERLY hose  Until discontinued      02/23/20 0255                Thank you for your consult. I will follow-up with patient. Please contact us if you have any additional questions.    Javan Bess MD  Neurology  [facility]

## 2020-02-23 NOTE — PROGRESS NOTES
This patient was signed out to me by Dr. Ewing at the end of his shift. She presented following a syncopal episode witness by a family member. She was pending CT imaging of the brain.     I just reassessed the patient. Her niece is at the bedside. The patient has dementia and is not able to provide information pertaining to this presentation.  She states that she feels well at this time.  While I was at the bedside, her niece called the patient's granddaughter who was with her when these syncopal episode occurred.  She reports that the patient was walking towards the kitchen to eat dinner at around 18:00 when she suddenly became weak.  The granddaughter felt that she was going to collapse, so she grabbed her and placed her in a chair.  The patient then lost consciousness for approximately 2 minutes.  She urinated and defecated on herself.  Following this, the patient complained of being very hungry, so she was fed prior to coming to the ED for evaluation.    On exam, the patient is awake and alert.  She is oriented to person and type of facility.  She is in no apparent distress. She is following commands.  She has moist oral mucous membranes.  She has no nuchal rigidity or carotid bruits.  She has a normal cardiac and pulmonary exam.  Her abdomen is soft, nontender, and nondistended. She has equal strength throughout all extremities. Her skin is warm and dry and without pallor.  EKG reveals sinus rhythm with occasional PVCs.  There are no acute ischemic changes.  CBC is within normal limits. She has no electrolyte anomalies.  She has mild CKD with normal BUN and creatinine.  Urinalysis results are not consistent with an acute UTI.    This elderly patient presents following a syncopal episode.  I will discuss with the nocturnist and place the patient in observation for monitoring.

## 2020-02-24 LAB
AORTIC ROOT ANNULUS: 2.84 CM
AORTIC VALVE CUSP SEPERATION: 1.99 CM
AV INDEX (PROSTH): 0.62
AV MEAN GRADIENT: 4 MMHG
AV PEAK GRADIENT: 8 MMHG
AV VALVE AREA: 2.86 CM2
AV VELOCITY RATIO: 0.68
BSA FOR ECHO PROCEDURE: 1.41 M2
CV ECHO LV RWT: 0.39 CM
DOP CALC AO PEAK VEL: 1.43 M/S
DOP CALC AO VTI: 28.55 CM
DOP CALC LVOT AREA: 4.6 CM2
DOP CALC LVOT DIAMETER: 2.42 CM
DOP CALC LVOT PEAK VEL: 0.97 M/S
DOP CALC LVOT STROKE VOLUME: 81.65 CM3
DOP CALCLVOT PEAK VEL VTI: 17.76 CM
E WAVE DECELERATION TIME: 231.36 MSEC
E/A RATIO: 1.46
E/E' RATIO: 9.5 M/S
ECHO LV POSTERIOR WALL: 0.84 CM (ref 0.6–1.1)
FRACTIONAL SHORTENING: 30 % (ref 28–44)
INTERVENTRICULAR SEPTUM: 1.15 CM (ref 0.6–1.1)
IVRT: 0.09 MSEC
LA MAJOR: 5.27 CM
LA MINOR: 4.9 CM
LA WIDTH: 3.91 CM
LEFT ATRIUM SIZE: 4.2 CM
LEFT ATRIUM VOLUME INDEX: 49.2 ML/M2
LEFT ATRIUM VOLUME: 70.89 CM3
LEFT INTERNAL DIMENSION IN SYSTOLE: 3.03 CM (ref 2.1–4)
LEFT VENTRICLE DIASTOLIC VOLUME INDEX: 59.07 ML/M2
LEFT VENTRICLE DIASTOLIC VOLUME: 85.12 ML
LEFT VENTRICLE MASS INDEX: 100 G/M2
LEFT VENTRICLE SYSTOLIC VOLUME INDEX: 24.8 ML/M2
LEFT VENTRICLE SYSTOLIC VOLUME: 35.72 ML
LEFT VENTRICULAR INTERNAL DIMENSION IN DIASTOLE: 4.34 CM (ref 3.5–6)
LEFT VENTRICULAR MASS: 143.61 G
LV LATERAL E/E' RATIO: 8.44 M/S
LV SEPTAL E/E' RATIO: 10.86 M/S
MV PEAK A VEL: 0.52 M/S
MV PEAK E VEL: 0.76 M/S
PISA TR MAX VEL: 3.3 M/S
PULM VEIN S/D RATIO: 1.17
PV PEAK D VEL: 0.65 M/S
PV PEAK S VEL: 0.76 M/S
PV PEAK VELOCITY: 0.88 CM/S
RA MAJOR: 4.79 CM
RA PRESSURE: 3 MMHG
RA WIDTH: 3.42 CM
RIGHT VENTRICULAR END-DIASTOLIC DIMENSION: 3 CM
RV TISSUE DOPPLER FREE WALL SYSTOLIC VELOCITY 1 (APICAL 4 CHAMBER VIEW): 13.4 CM/S
SINUS: 3.11 CM
STJ: 3.01 CM
TDI LATERAL: 0.09 M/S
TDI SEPTAL: 0.07 M/S
TDI: 0.08 M/S
TR MAX PG: 44 MMHG
TRICUSPID ANNULAR PLANE SYSTOLIC EXCURSION: 1.74 CM
TV REST PULMONARY ARTERY PRESSURE: 47 MMHG

## 2020-03-04 ENCOUNTER — HOSPITAL ENCOUNTER (INPATIENT)
Facility: HOSPITAL | Age: 85
LOS: 7 days | Discharge: HOSPICE | DRG: 813 | End: 2020-03-11
Attending: EMERGENCY MEDICINE | Admitting: EMERGENCY MEDICINE
Payer: MEDICARE

## 2020-03-04 DIAGNOSIS — D64.9 SYMPTOMATIC ANEMIA: Primary | ICD-10-CM

## 2020-03-04 DIAGNOSIS — R93.5 ABNORMAL CT OF THE ABDOMEN: ICD-10-CM

## 2020-03-04 DIAGNOSIS — R06.02 SHORTNESS OF BREATH: ICD-10-CM

## 2020-03-04 DIAGNOSIS — F03.91 DEMENTIA WITH BEHAVIORAL DISTURBANCE, UNSPECIFIED DEMENTIA TYPE: ICD-10-CM

## 2020-03-04 DIAGNOSIS — R09.02 HYPOXIA: ICD-10-CM

## 2020-03-04 DIAGNOSIS — J81.0 ACUTE PULMONARY EDEMA: ICD-10-CM

## 2020-03-04 DIAGNOSIS — Z51.5 PALLIATIVE CARE ENCOUNTER: ICD-10-CM

## 2020-03-04 LAB
ABO + RH BLD: NORMAL
ALBUMIN SERPL BCP-MCNC: 2.8 G/DL (ref 3.5–5.2)
ALP SERPL-CCNC: 209 U/L (ref 55–135)
ALT SERPL W/O P-5'-P-CCNC: 79 U/L (ref 10–44)
ANION GAP SERPL CALC-SCNC: 17 MMOL/L (ref 8–16)
ANION GAP SERPL CALC-SCNC: 8 MMOL/L (ref 8–16)
APTT BLDCRRT: 32.5 SEC (ref 21–32)
AST SERPL-CCNC: 78 U/L (ref 10–40)
BASOPHILS # BLD AUTO: 0.01 K/UL (ref 0–0.2)
BASOPHILS # BLD AUTO: 0.02 K/UL (ref 0–0.2)
BASOPHILS # BLD AUTO: 0.02 K/UL (ref 0–0.2)
BASOPHILS NFR BLD: 0.2 % (ref 0–1.9)
BILIRUB SERPL-MCNC: 1.3 MG/DL (ref 0.1–1)
BLD GP AB SCN CELLS X3 SERPL QL: NORMAL
BLD PROD TYP BPU: NORMAL
BLOOD UNIT EXPIRATION DATE: NORMAL
BLOOD UNIT TYPE CODE: 6200
BLOOD UNIT TYPE: NORMAL
BNP SERPL-MCNC: 645 PG/ML (ref 0–99)
BUN SERPL-MCNC: 15 MG/DL (ref 6–30)
BUN SERPL-MCNC: 15 MG/DL (ref 8–23)
CALCIUM SERPL-MCNC: 8.1 MG/DL (ref 8.7–10.5)
CHLORIDE SERPL-SCNC: 105 MMOL/L (ref 95–110)
CHLORIDE SERPL-SCNC: 99 MMOL/L (ref 95–110)
CO2 SERPL-SCNC: 25 MMOL/L (ref 23–29)
CODING SYSTEM: NORMAL
CREAT SERPL-MCNC: 0.7 MG/DL (ref 0.5–1.4)
CREAT SERPL-MCNC: 0.8 MG/DL (ref 0.5–1.4)
CTP QC/QA: YES
DIFFERENTIAL METHOD: ABNORMAL
DISPENSE STATUS: NORMAL
EOSINOPHIL # BLD AUTO: 0 K/UL (ref 0–0.5)
EOSINOPHIL # BLD AUTO: 0.1 K/UL (ref 0–0.5)
EOSINOPHIL # BLD AUTO: 0.1 K/UL (ref 0–0.5)
EOSINOPHIL NFR BLD: 0.3 % (ref 0–8)
EOSINOPHIL NFR BLD: 0.6 % (ref 0–8)
EOSINOPHIL NFR BLD: 0.6 % (ref 0–8)
ERYTHROCYTE [DISTWIDTH] IN BLOOD BY AUTOMATED COUNT: 16.7 % (ref 11.5–14.5)
ERYTHROCYTE [DISTWIDTH] IN BLOOD BY AUTOMATED COUNT: 16.7 % (ref 11.5–14.5)
ERYTHROCYTE [DISTWIDTH] IN BLOOD BY AUTOMATED COUNT: 16.8 % (ref 11.5–14.5)
EST. GFR  (AFRICAN AMERICAN): >60 ML/MIN/1.73 M^2
EST. GFR  (NON AFRICAN AMERICAN): >60 ML/MIN/1.73 M^2
GLUCOSE SERPL-MCNC: 129 MG/DL (ref 70–110)
GLUCOSE SERPL-MCNC: 130 MG/DL (ref 70–110)
HCT VFR BLD AUTO: 22.8 % (ref 37–48.5)
HCT VFR BLD AUTO: 26.3 % (ref 37–48.5)
HCT VFR BLD AUTO: 26.3 % (ref 37–48.5)
HCT VFR BLD CALC: 20 %PCV (ref 36–54)
HGB BLD-MCNC: 6.9 G/DL (ref 12–16)
HGB BLD-MCNC: 8.3 G/DL (ref 12–16)
HGB BLD-MCNC: 8.3 G/DL (ref 12–16)
IMM GRANULOCYTES # BLD AUTO: 0.05 K/UL (ref 0–0.04)
IMM GRANULOCYTES NFR BLD AUTO: 0.6 % (ref 0–0.5)
IMM GRANULOCYTES NFR BLD AUTO: 0.6 % (ref 0–0.5)
IMM GRANULOCYTES NFR BLD AUTO: 0.8 % (ref 0–0.5)
INR PPP: 1.8 (ref 0.8–1.2)
LACTATE SERPL-SCNC: 1.7 MMOL/L (ref 0.5–2.2)
LYMPHOCYTES # BLD AUTO: 0.9 K/UL (ref 1–4.8)
LYMPHOCYTES # BLD AUTO: 1.2 K/UL (ref 1–4.8)
LYMPHOCYTES # BLD AUTO: 1.2 K/UL (ref 1–4.8)
LYMPHOCYTES NFR BLD: 14.2 % (ref 18–48)
LYMPHOCYTES NFR BLD: 14.2 % (ref 18–48)
LYMPHOCYTES NFR BLD: 14.4 % (ref 18–48)
MCH RBC QN AUTO: 29.6 PG (ref 27–31)
MCH RBC QN AUTO: 29.6 PG (ref 27–31)
MCH RBC QN AUTO: 29.7 PG (ref 27–31)
MCHC RBC AUTO-ENTMCNC: 30.3 G/DL (ref 32–36)
MCHC RBC AUTO-ENTMCNC: 31.6 G/DL (ref 32–36)
MCHC RBC AUTO-ENTMCNC: 31.6 G/DL (ref 32–36)
MCV RBC AUTO: 94 FL (ref 82–98)
MCV RBC AUTO: 94 FL (ref 82–98)
MCV RBC AUTO: 98 FL (ref 82–98)
MONOCYTES # BLD AUTO: 0.5 K/UL (ref 0.3–1)
MONOCYTES # BLD AUTO: 0.7 K/UL (ref 0.3–1)
MONOCYTES # BLD AUTO: 0.7 K/UL (ref 0.3–1)
MONOCYTES NFR BLD: 8.1 % (ref 4–15)
MONOCYTES NFR BLD: 8.6 % (ref 4–15)
MONOCYTES NFR BLD: 8.6 % (ref 4–15)
NEUTROPHILS # BLD AUTO: 4.7 K/UL (ref 1.8–7.7)
NEUTROPHILS # BLD AUTO: 6.2 K/UL (ref 1.8–7.7)
NEUTROPHILS # BLD AUTO: 6.2 K/UL (ref 1.8–7.7)
NEUTROPHILS NFR BLD: 75.8 % (ref 38–73)
NEUTROPHILS NFR BLD: 75.8 % (ref 38–73)
NEUTROPHILS NFR BLD: 76.2 % (ref 38–73)
NRBC BLD-RTO: 1 /100 WBC
PLATELET # BLD AUTO: 20 K/UL (ref 150–350)
PLATELET # BLD AUTO: 20 K/UL (ref 150–350)
PLATELET # BLD AUTO: 21 K/UL (ref 150–350)
PLATELET BLD QL SMEAR: ABNORMAL
PMV BLD AUTO: 12.8 FL (ref 9.2–12.9)
PMV BLD AUTO: 12.8 FL (ref 9.2–12.9)
PMV BLD AUTO: 13 FL (ref 9.2–12.9)
POC IONIZED CALCIUM: 1.2 MMOL/L (ref 1.06–1.42)
POC MOLECULAR INFLUENZA A AGN: NEGATIVE
POC MOLECULAR INFLUENZA B AGN: NEGATIVE
POC TCO2 (MEASURED): 26 MMOL/L (ref 23–29)
POTASSIUM BLD-SCNC: 3.8 MMOL/L (ref 3.5–5.1)
POTASSIUM SERPL-SCNC: 3.9 MMOL/L (ref 3.5–5.1)
PROT SERPL-MCNC: 5.3 G/DL (ref 6–8.4)
PROTHROMBIN TIME: 19.5 SEC (ref 9–12.5)
RBC # BLD AUTO: 2.32 M/UL (ref 4–5.4)
RBC # BLD AUTO: 2.8 M/UL (ref 4–5.4)
RBC # BLD AUTO: 2.8 M/UL (ref 4–5.4)
SAMPLE: ABNORMAL
SODIUM BLD-SCNC: 137 MMOL/L (ref 136–145)
SODIUM SERPL-SCNC: 138 MMOL/L (ref 136–145)
TRANS ERYTHROCYTES VOL PATIENT: NORMAL ML
TROPONIN I SERPL DL<=0.01 NG/ML-MCNC: 0.03 NG/ML (ref 0–0.03)
TROPONIN I SERPL DL<=0.01 NG/ML-MCNC: 0.04 NG/ML (ref 0–0.03)
WBC # BLD AUTO: 6.19 K/UL (ref 3.9–12.7)
WBC # BLD AUTO: 8.23 K/UL (ref 3.9–12.7)
WBC # BLD AUTO: 8.23 K/UL (ref 3.9–12.7)

## 2020-03-04 PROCEDURE — 93005 ELECTROCARDIOGRAM TRACING: CPT

## 2020-03-04 PROCEDURE — 93010 ELECTROCARDIOGRAM REPORT: CPT | Mod: ,,, | Performed by: INTERNAL MEDICINE

## 2020-03-04 PROCEDURE — 63600175 PHARM REV CODE 636 W HCPCS: Performed by: EMERGENCY MEDICINE

## 2020-03-04 PROCEDURE — 87040 BLOOD CULTURE FOR BACTERIA: CPT

## 2020-03-04 PROCEDURE — 86901 BLOOD TYPING SEROLOGIC RH(D): CPT

## 2020-03-04 PROCEDURE — 82962 GLUCOSE BLOOD TEST: CPT

## 2020-03-04 PROCEDURE — 85610 PROTHROMBIN TIME: CPT

## 2020-03-04 PROCEDURE — 96374 THER/PROPH/DIAG INJ IV PUSH: CPT

## 2020-03-04 PROCEDURE — 86920 COMPATIBILITY TEST SPIN: CPT

## 2020-03-04 PROCEDURE — 80053 COMPREHEN METABOLIC PANEL: CPT

## 2020-03-04 PROCEDURE — 21400001 HC TELEMETRY ROOM

## 2020-03-04 PROCEDURE — 36430 TRANSFUSION BLD/BLD COMPNT: CPT

## 2020-03-04 PROCEDURE — 83880 ASSAY OF NATRIURETIC PEPTIDE: CPT

## 2020-03-04 PROCEDURE — 85730 THROMBOPLASTIN TIME PARTIAL: CPT

## 2020-03-04 PROCEDURE — 25000242 PHARM REV CODE 250 ALT 637 W/ HCPCS: Performed by: EMERGENCY MEDICINE

## 2020-03-04 PROCEDURE — P9021 RED BLOOD CELLS UNIT: HCPCS

## 2020-03-04 PROCEDURE — 83605 ASSAY OF LACTIC ACID: CPT

## 2020-03-04 PROCEDURE — 84484 ASSAY OF TROPONIN QUANT: CPT | Mod: 91

## 2020-03-04 PROCEDURE — 93010 EKG 12-LEAD: ICD-10-PCS | Mod: ,,, | Performed by: INTERNAL MEDICINE

## 2020-03-04 PROCEDURE — 96375 TX/PRO/DX INJ NEW DRUG ADDON: CPT

## 2020-03-04 PROCEDURE — C9113 INJ PANTOPRAZOLE SODIUM, VIA: HCPCS | Performed by: EMERGENCY MEDICINE

## 2020-03-04 PROCEDURE — 85025 COMPLETE CBC W/AUTO DIFF WBC: CPT | Mod: 91

## 2020-03-04 PROCEDURE — 99285 EMERGENCY DEPT VISIT HI MDM: CPT | Mod: 25

## 2020-03-04 RX ORDER — IPRATROPIUM BROMIDE 0.5 MG/2.5ML
1000 SOLUTION RESPIRATORY (INHALATION)
Status: COMPLETED | OUTPATIENT
Start: 2020-03-04 | End: 2020-03-04

## 2020-03-04 RX ORDER — SODIUM CHLORIDE 0.9 % (FLUSH) 0.9 %
10 SYRINGE (ML) INJECTION
Status: DISCONTINUED | OUTPATIENT
Start: 2020-03-04 | End: 2020-03-11 | Stop reason: HOSPADM

## 2020-03-04 RX ORDER — ASPIRIN 81 MG/1
TABLET ORAL
Status: ON HOLD | COMMUNITY
Start: 2020-01-29 | End: 2020-03-11 | Stop reason: HOSPADM

## 2020-03-04 RX ORDER — HYDROCODONE BITARTRATE AND ACETAMINOPHEN 500; 5 MG/1; MG/1
TABLET ORAL
Status: DISCONTINUED | OUTPATIENT
Start: 2020-03-04 | End: 2020-03-11 | Stop reason: HOSPADM

## 2020-03-04 RX ORDER — HYDROCHLOROTHIAZIDE 25 MG/1
TABLET ORAL
Status: ON HOLD | COMMUNITY
Start: 2020-03-02 | End: 2020-03-11 | Stop reason: HOSPADM

## 2020-03-04 RX ORDER — ALBUTEROL SULFATE 2.5 MG/.5ML
10 SOLUTION RESPIRATORY (INHALATION)
Status: COMPLETED | OUTPATIENT
Start: 2020-03-04 | End: 2020-03-04

## 2020-03-04 RX ORDER — PANTOPRAZOLE SODIUM 40 MG/10ML
80 INJECTION, POWDER, LYOPHILIZED, FOR SOLUTION INTRAVENOUS
Status: COMPLETED | OUTPATIENT
Start: 2020-03-04 | End: 2020-03-04

## 2020-03-04 RX ORDER — SODIUM CHLORIDE, SODIUM LACTATE, POTASSIUM CHLORIDE, CALCIUM CHLORIDE 600; 310; 30; 20 MG/100ML; MG/100ML; MG/100ML; MG/100ML
INJECTION, SOLUTION INTRAVENOUS CONTINUOUS
Status: DISCONTINUED | OUTPATIENT
Start: 2020-03-04 | End: 2020-03-07

## 2020-03-04 RX ORDER — FUROSEMIDE 10 MG/ML
40 INJECTION INTRAMUSCULAR; INTRAVENOUS
Status: COMPLETED | OUTPATIENT
Start: 2020-03-04 | End: 2020-03-04

## 2020-03-04 RX ADMIN — PANTOPRAZOLE SODIUM 8 MG/HR: 40 INJECTION, POWDER, LYOPHILIZED, FOR SOLUTION INTRAVENOUS at 03:03

## 2020-03-04 RX ADMIN — IPRATROPIUM BROMIDE 1000 MCG: 0.5 SOLUTION RESPIRATORY (INHALATION) at 01:03

## 2020-03-04 RX ADMIN — SODIUM CHLORIDE, SODIUM LACTATE, POTASSIUM CHLORIDE, AND CALCIUM CHLORIDE: .6; .31; .03; .02 INJECTION, SOLUTION INTRAVENOUS at 07:03

## 2020-03-04 RX ADMIN — ALBUTEROL SULFATE 10 MG: 2.5 SOLUTION RESPIRATORY (INHALATION) at 01:03

## 2020-03-04 RX ADMIN — PANTOPRAZOLE SODIUM 80 MG: 40 INJECTION, POWDER, FOR SOLUTION INTRAVENOUS at 03:03

## 2020-03-04 RX ADMIN — FUROSEMIDE 40 MG: 10 INJECTION, SOLUTION INTRAVENOUS at 01:03

## 2020-03-04 NOTE — ED NOTES
Pt family member is questioning a bruise on her left arm around her AC that was present upon arrival.  I told the family member it could have been a failed IV attempt by ems.  Pt has an IV in her left hand and I advised the pt I can try and get blood from that IV.  Family member states that bruise was where blood was probably already obtained.  I informed the family member that no blood was obtained from the pt.  Then I asked if I could get blood from the pt.  The family member said no and so I left the room and informed the charge nurse of the situation.

## 2020-03-04 NOTE — ED TRIAGE NOTES
Pt presents to ED by ems c/o increased sob which isn't her baseline.  EMS states her sats were 88% on room air and 92% on 2L.  Per family, pt has dementia.  State she lives with her niece.  Family is a poor historian on pt health, but does state she has dementia.  Pt able to state her name, but doesn't know where she is at or the day. Denies any chest pains, HA, n/v/d

## 2020-03-04 NOTE — ED PROVIDER NOTES
Encounter Date: 3/4/2020    SCRIBE #1 NOTE: I, Familia Brown, am scribing for, and in the presence of,  Jared Vo MD. I have scribed the following portions of the note - Other sections scribed: HPI, ROS, PE.       History     Chief Complaint   Patient presents with    Shortness of Breath     Per EMS, family reports pateint appears short of breath. Room air sat was 88%, upon arrival to ED sat of 92% on 2L.      This 88 y.o F with a hx of dementia presents to the ED via EMS accompanied by her niece c/o pallor, fatigue and SOB when the pt woke up this AM. The pt's niece reports several sick contacts at home. Per EMS, the pt presented with an O2 sat of 88% on RA, which improved to 92% on 2L. The pt denies fever, rhinorrhea, abdominal pain and chest pain. She does not use supplemental O2 at home. She does not smoke cigarettes, consume EtOH or use illicit drugs. Further hx is limited due to dementia. No prior tx.     The history is provided by the patient and a relative.     Review of patient's allergies indicates:  No Known Allergies  Past Medical History:   Diagnosis Date    Dementia     Encounter for blood transfusion 03/04/2020    no previous transfusion history    Requires assistance with activities of daily living (ADL)     Symptomatic anemia 03/04/2020     Past Surgical History:   Procedure Laterality Date    NO PAST SURGERIES  03/04/2020     History reviewed. No pertinent family history.  Social History     Tobacco Use    Smoking status: Former Smoker   Substance Use Topics    Alcohol use: Not Currently    Drug use: Not Currently     Review of Systems   Unable to perform ROS: Dementia (limited ROS provided by the pt's niece)   Constitutional: Positive for fatigue. Negative for fever.   HENT: Negative for rhinorrhea.    Respiratory: Positive for shortness of breath.    Cardiovascular: Negative for chest pain.   Gastrointestinal: Negative for abdominal pain.   Skin: Positive for pallor.   All other  systems reviewed and are negative.      Physical Exam     Initial Vitals [03/04/20 1005]   BP Pulse Resp Temp SpO2   (!) 101/59 78 20 98 °F (36.7 °C) (!) 92 %      MAP       --         Physical Exam    Nursing note and vitals reviewed.  Constitutional: She appears well-developed and well-nourished.  Non-toxic appearance. No distress.   HENT:   Head: Normocephalic and atraumatic.   Mouth/Throat: Oropharynx is clear and moist and mucous membranes are normal.   Eyes: Conjunctivae and EOM are normal. Pupils are equal, round, and reactive to light. Right conjunctiva is not injected. Left conjunctiva is not injected. No scleral icterus.   Neck: Normal range of motion and full passive range of motion without pain. Neck supple.   Cardiovascular: Normal rate, regular rhythm, S1 normal, S2 normal, normal heart sounds and normal pulses. Exam reveals no gallop and no friction rub.    No murmur heard.  Pulses:       Radial pulses are 2+ on the right side, and 2+ on the left side.   Pulmonary/Chest: Effort normal. She has wheezes (minimal).   O2 saturation is 94-95% on RA when I am able to obtain a good waveform.   Abdominal: Soft. She exhibits no distension. There is no tenderness.   Musculoskeletal: Normal range of motion. She exhibits no edema.   Good active ROM of all extremities. No lower extremity edema or cyanosis.    Neurological: No cranial nerve deficit. Gait normal.   Pleasently demented. Not able to answer questions. Pt is saying she feels fine.   Skin: Skin is warm. No ecchymosis and no rash noted.   Bruise on left AC were previous IV attempt was placed   Psychiatric: She has a normal mood and affect. Thought content normal.         ED Course   Procedures  Labs Reviewed   CBC W/ AUTO DIFFERENTIAL - Abnormal; Notable for the following components:       Result Value    RBC 2.32 (*)     Hemoglobin 6.9 (*)     Hematocrit 22.8 (*)     Mean Corpuscular Hemoglobin Conc 30.3 (*)     RDW 16.8 (*)     Platelets 21 (*)     MPV  13.0 (*)     Immature Granulocytes 0.8 (*)     Immature Grans (Abs) 0.05 (*)     Lymph # 0.9 (*)     nRBC 1 (*)     Gran% 76.2 (*)     Lymph% 14.4 (*)     Platelet Estimate Decreased (*)     All other components within normal limits    Narrative:        critical result(s) called and verbal readback obtained from   Grace Uday by [facility] [place] 03/04/2020 14:25   COMPREHENSIVE METABOLIC PANEL - Abnormal; Notable for the following components:    Glucose 130 (*)     Calcium 8.1 (*)     Total Protein 5.3 (*)     Albumin 2.8 (*)     Total Bilirubin 1.3 (*)     Alkaline Phosphatase 209 (*)     AST 78 (*)     ALT 79 (*)     All other components within normal limits   TROPONIN I - Abnormal; Notable for the following components:    Troponin I 0.029 (*)     All other components within normal limits   B-TYPE NATRIURETIC PEPTIDE - Abnormal; Notable for the following components:     (*)     All other components within normal limits   PROTIME-INR - Abnormal; Notable for the following components:    Prothrombin Time 19.5 (*)     INR 1.8 (*)     All other components within normal limits   APTT - Abnormal; Notable for the following components:    aPTT 32.5 (*)     All other components within normal limits   ISTAT PROCEDURE - Abnormal; Notable for the following components:    POC Glucose 129 (*)     POC Anion Gap 17 (*)     POC Hematocrit 20 (*)     All other components within normal limits   CULTURE, BLOOD   CULTURE, BLOOD   LACTIC ACID, PLASMA   APTT   PROTIME-INR   POCT INFLUENZA A/B MOLECULAR   TYPE & SCREEN   ISTAT CHEM8   PREPARE RBC SOFT     EKG Readings: (Independently Interpreted)   EKG done at 10:23 a.m. showing normal sinus rhythm with premature supraventricular complexes and PVCs rate 88.  Flat T-waves diffusely.  No ST elevation.  Normal axis and QRS.  Nonspecific EKG.     ECG Results          EKG 12-lead (In process)  Result time 03/04/20 12:46:52    In process by Interface, Lab In [facility] (03/04/20 12:46:52)                  Narrative:    Test Reason : R06.02,    Vent. Rate : 088 BPM     Atrial Rate : 088 BPM     P-R Int : 146 ms          QRS Dur : 080 ms      QT Int : 374 ms       P-R-T Axes : 080 059 111 degrees     QTc Int : 452 ms    Sinus rhythm with Premature supraventricular complexes and with frequent   Premature ventricular complexes  Nonspecific ST and T wave abnormality  Abnormal ECG  When compared with ECG of 22-FEB-2020 19:25,  Significant changes have occurred    Referred By: [email or web address]   SELF           Confirmed By:                   In process by Interface, Lab In [facility] (03/04/20 12:45:14)                 Narrative:    Test Reason : R06.02,    Vent. Rate : 088 BPM     Atrial Rate : 088 BPM     P-R Int : 146 ms          QRS Dur : 080 ms      QT Int : 374 ms       P-R-T Axes : 080 059 111 degrees     QTc Int : 452 ms    Sinus rhythm with Premature supraventricular complexes and with frequent   Premature ventricular complexes  Nonspecific ST and T wave abnormality  Abnormal ECG  When compared with ECG of 22-FEB-2020 19:25,  Significant changes have occurred    Referred By: [email or web address]   SELF           Confirmed By:                             Imaging Results           X-Ray Chest AP Portable (Final result)  Result time 03/04/20 12:58:30    Final result by Hallie Quinn MD (03/04/20 12:58:30)                 Impression:      1. Pulmonary vascular congestion and pulmonary edema, with mild bilateral pleural fluid.  2. Outward convexity and asymmetric prominence of the right hilar region associated with low right lung volume.  Although this may be due to scoliosis and cardiac decompensation, hilar or infrahilar mass could have a similar appearance.  Thoracic CT should be considered for further evaluation.  This report was flagged in Epic as abnormal.      Electronically signed by: Hallie Quinn MD  Date:    03/04/2020  Time:    12:58             Narrative:    EXAMINATION:  XR CHEST AP PORTABLE    CLINICAL  HISTORY:  CHF;    TECHNIQUE:  Single frontal view of the chest was performed.    COMPARISON:  Prior dated 10/30/2012    FINDINGS:  The mediastinal structures are midline.  The cardiac silhouette is not well evaluated.  There is right hilar prominence and outward convexity and relatively low lung volumes on the right with comparison to the left, possibly due to scoliosis.    There is pulmonary vascular congestion and bilateral ground-glass opacity likely due to pulmonary edema.  Blunting of the costophrenic angles is consistent with bilateral pleural fluid                                 Medical Decision Making:   Initial Assessment:   88-year-old female presenting secondary to reported shortness of breath and looking more pale.  Patient is a very poor historian.  Unfortunately family would not allow us to get blood or do any further interventions on the patient until I report back to them on the chest x-ray.  They also requested that I give the patient blood without checking her blood levels.  I tried to explain to them in detail that I would need to check her blood levels and then also make sure I type and screen her that if she needed blood it would be the correct blood.  There were very unhappy with this answer.  They then stated that they were going to sign out AMA after I discussed the chest x-ray with them.  We were not allowed to touch the patient until that time period.  This delayed labs and further interventions on the patient.  Differential is wide.  This could be pneumonia, flu, CHF, ACS, electrolyte abnormality, anemia.    Chest x-ray was showing some pulmonary edema.  New family showed up which allowed us to get labs and also any interventions that we thought necessary.  We are able to get labs at this time.  There was a major delay due to this.  Patient was given breathing treatments and IV Lasix.    Upon revaluation, the pt's O2 saturation was 82% on RA while sleeping, increased to 93% on 2L.    Labs  are showing hemoglobin is 6.9.  Rectal exam was done on the patient.  Hemoccult positive but with no melena or gross blood.  Started on PPI drip.  Platelets are also low at 22,000 two thousand.  Due to no obvious bleeding on rectal exam holding off on platelets but I will give patient a unit of blood.  Family consented for blood.  Pending the rest of labs.  They are [id number] for admission.    Patient's troponin is likely elevated secondary to her anemia.  Chemistry is reassuring other than elevated alk-phos and slightly elevated liver enzymes.  BNP in the 600s.  This is consistent with a CHF for pulmonary edema as earlier before.  Family was updated regarding labs and imaging.  They gave consent for admission.  I spoke with Dr. Warner and admitted the patient to his service.    Please put in 35 minutes of critical care due to patient having a high risk of respiratory failure.   Separate from teaching and exclusive of procedure and ekg time  Includes:  Time at bedside  Time reviewing test results  Time discussing case with staff  Time documenting the medical record  Time spent with family members  Time spent with consults  Management    Clinical Tests:   Lab Tests: Ordered and Reviewed  Radiological Study: Reviewed and Ordered  Medical Tests: Ordered and Reviewed            Scribe Attestation:   Scribe #1: I performed the above scribed service and the documentation accurately describes the services I performed. I attest to the accuracy of the note.                          Clinical Impression:       ICD-10-CM ICD-9-CM   1. Symptomatic anemia D64.9 285.9   2. Shortness of breath R06.02 786.05   3. Acute pulmonary edema J81.0 518.4   4. Hypoxia R09.02 799.02             ED Disposition Condition    Admit              I, Jared Vo, personally performed the services described in this documentation. All medical record entries made by the scribe were at my direction and in my presence. I have reviewed the chart and agree  that the record reflects my personal performance and is accurate and complete.               Jared Vo MD  03/04/20 1572

## 2020-03-05 PROBLEM — Z51.5 PALLIATIVE CARE ENCOUNTER: Status: ACTIVE | Noted: 2020-03-05

## 2020-03-05 PROBLEM — D69.6 THROMBOCYTOPENIA: Status: ACTIVE | Noted: 2020-03-05

## 2020-03-05 LAB
ALBUMIN SERPL BCP-MCNC: 2.7 G/DL (ref 3.5–5.2)
ALP SERPL-CCNC: 180 U/L (ref 55–135)
ALT SERPL W/O P-5'-P-CCNC: 63 U/L (ref 10–44)
ANION GAP SERPL CALC-SCNC: 8 MMOL/L (ref 8–16)
AST SERPL-CCNC: 59 U/L (ref 10–40)
BASOPHILS # BLD AUTO: 0.01 K/UL (ref 0–0.2)
BASOPHILS NFR BLD: 0.1 % (ref 0–1.9)
BILIRUB SERPL-MCNC: 1.5 MG/DL (ref 0.1–1)
BLD PROD TYP BPU: NORMAL
BLD PROD TYP BPU: NORMAL
BLOOD UNIT EXPIRATION DATE: NORMAL
BLOOD UNIT EXPIRATION DATE: NORMAL
BLOOD UNIT TYPE CODE: 5100
BLOOD UNIT TYPE CODE: 8400
BLOOD UNIT TYPE: NORMAL
BLOOD UNIT TYPE: NORMAL
BUN SERPL-MCNC: 14 MG/DL (ref 8–23)
CALCIUM SERPL-MCNC: 7.9 MG/DL (ref 8.7–10.5)
CHLORIDE SERPL-SCNC: 104 MMOL/L (ref 95–110)
CO2 SERPL-SCNC: 27 MMOL/L (ref 23–29)
CODING SYSTEM: NORMAL
CODING SYSTEM: NORMAL
CREAT SERPL-MCNC: 0.8 MG/DL (ref 0.5–1.4)
DIFFERENTIAL METHOD: ABNORMAL
DISPENSE STATUS: NORMAL
DISPENSE STATUS: NORMAL
EOSINOPHIL # BLD AUTO: 0.1 K/UL (ref 0–0.5)
EOSINOPHIL NFR BLD: 1.6 % (ref 0–8)
ERYTHROCYTE [DISTWIDTH] IN BLOOD BY AUTOMATED COUNT: 16.8 % (ref 11.5–14.5)
EST. GFR  (AFRICAN AMERICAN): >60 ML/MIN/1.73 M^2
EST. GFR  (NON AFRICAN AMERICAN): >60 ML/MIN/1.73 M^2
GLUCOSE SERPL-MCNC: 109 MG/DL (ref 70–110)
HCT VFR BLD AUTO: 24.4 % (ref 37–48.5)
HGB BLD-MCNC: 7.9 G/DL (ref 12–16)
IMM GRANULOCYTES # BLD AUTO: 0.05 K/UL (ref 0–0.04)
IMM GRANULOCYTES NFR BLD AUTO: 0.7 % (ref 0–0.5)
LYMPHOCYTES # BLD AUTO: 1.2 K/UL (ref 1–4.8)
LYMPHOCYTES NFR BLD: 17.5 % (ref 18–48)
MCH RBC QN AUTO: 30 PG (ref 27–31)
MCHC RBC AUTO-ENTMCNC: 32.4 G/DL (ref 32–36)
MCV RBC AUTO: 93 FL (ref 82–98)
MONOCYTES # BLD AUTO: 0.6 K/UL (ref 0.3–1)
MONOCYTES NFR BLD: 9.1 % (ref 4–15)
NEUTROPHILS # BLD AUTO: 5 K/UL (ref 1.8–7.7)
NEUTROPHILS NFR BLD: 71 % (ref 38–73)
NRBC BLD-RTO: 1 /100 WBC
NUM UNITS TRANS WBC-POOR PLATPHERESIS: NORMAL
PLATELET # BLD AUTO: 20 K/UL (ref 150–350)
PMV BLD AUTO: 12.5 FL (ref 9.2–12.9)
POTASSIUM SERPL-SCNC: 3.8 MMOL/L (ref 3.5–5.1)
PROT SERPL-MCNC: 5.1 G/DL (ref 6–8.4)
RBC # BLD AUTO: 2.63 M/UL (ref 4–5.4)
SODIUM SERPL-SCNC: 139 MMOL/L (ref 136–145)
TRANS ERYTHROCYTES VOL PATIENT: NORMAL ML
TROPONIN I SERPL DL<=0.01 NG/ML-MCNC: 0.03 NG/ML (ref 0–0.03)
WBC # BLD AUTO: 7.02 K/UL (ref 3.9–12.7)

## 2020-03-05 PROCEDURE — 21400001 HC TELEMETRY ROOM

## 2020-03-05 PROCEDURE — 99222 PR INITIAL HOSPITAL CARE,LEVL II: ICD-10-PCS | Mod: ,,, | Performed by: NURSE PRACTITIONER

## 2020-03-05 PROCEDURE — 25000003 PHARM REV CODE 250: Performed by: INTERNAL MEDICINE

## 2020-03-05 PROCEDURE — 84484 ASSAY OF TROPONIN QUANT: CPT

## 2020-03-05 PROCEDURE — 99222 1ST HOSP IP/OBS MODERATE 55: CPT | Mod: ,,, | Performed by: NURSE PRACTITIONER

## 2020-03-05 PROCEDURE — 36415 COLL VENOUS BLD VENIPUNCTURE: CPT

## 2020-03-05 PROCEDURE — 85025 COMPLETE CBC W/AUTO DIFF WBC: CPT

## 2020-03-05 PROCEDURE — P9037 PLATE PHERES LEUKOREDU IRRAD: HCPCS

## 2020-03-05 PROCEDURE — 80053 COMPREHEN METABOLIC PANEL: CPT

## 2020-03-05 PROCEDURE — P9021 RED BLOOD CELLS UNIT: HCPCS

## 2020-03-05 RX ORDER — HYDROCODONE BITARTRATE AND ACETAMINOPHEN 500; 5 MG/1; MG/1
TABLET ORAL
Status: DISCONTINUED | OUTPATIENT
Start: 2020-03-05 | End: 2020-03-11 | Stop reason: HOSPADM

## 2020-03-05 RX ORDER — PANTOPRAZOLE SODIUM 40 MG/1
40 TABLET, DELAYED RELEASE ORAL DAILY
Status: DISCONTINUED | OUTPATIENT
Start: 2020-03-05 | End: 2020-03-11 | Stop reason: HOSPADM

## 2020-03-05 RX ORDER — ASPIRIN 81 MG/1
81 TABLET ORAL DAILY
Status: DISCONTINUED | OUTPATIENT
Start: 2020-03-05 | End: 2020-03-07

## 2020-03-05 RX ORDER — FUROSEMIDE 40 MG/1
40 TABLET ORAL DAILY
Status: DISCONTINUED | OUTPATIENT
Start: 2020-03-05 | End: 2020-03-07

## 2020-03-05 RX ADMIN — PANTOPRAZOLE SODIUM 40 MG: 40 TABLET, DELAYED RELEASE ORAL at 02:03

## 2020-03-05 RX ADMIN — FUROSEMIDE 40 MG: 40 TABLET ORAL at 06:03

## 2020-03-05 NOTE — H&P
[facility] Hospital Medicine  History & Physical    Patient Name: Amena Wick  MRN: [id number]  Admission Date: 3/4/2020  Attending Physician: Orquidea Garcia MD  Primary Care Provider: Thai Her MD         Patient information was obtained from patient and ER records.     Subjective:     Principal Problem:Symptomatic anemia    Chief Complaint:   Chief Complaint   Patient presents with    Shortness of Breath     Per EMS, family reports pateint appears short of breath. Room air sat was 88%, upon arrival to ED sat of 92% on 2L.         HPI: Amena Nash is a 88 YOCF with PMH of dementia who presented to the ED via EMS accompanied by her niece c/o pallor, fatigue and SOB when the pt woke up. . Accordign to the history obtained in the ER , the pt's niece reported several sick contacts at home. Per EMS, the pt presented with an O2 sat of 88% on RA, which improved to 92% on 2L. The pt denies fever, rhinorrhea, abdominal pain and chest pain. She does not use supplemental O2 at home. She does not smoke cigarettes, consume EtOH or use illicit drugs. Further hx is limited due to dementia. No prior tx.      At the time of my examination the pt reported no symptoms, provided the above account and reported that her niece noticed unusual color/pallor on my face  She denied any SOB, palptitations but has recently noticed increased weakness.        Review of patient's allergies indicates:  No Known Allergies    Past Medical History:   Diagnosis Date    Dementia     Encounter for blood transfusion 03/04/2020    no previous transfusion history    Requires assistance with activities of daily living (ADL)     Symptomatic anemia 03/04/2020       Past Surgical History:   Procedure Laterality Date    NO PAST SURGERIES  03/04/2020       Review of patient's allergies indicates:  No Known Allergies    No current facility-administered medications on file prior to encounter.      Current Outpatient  Medications on File Prior to Encounter   Medication Sig    aspirin (ECOTRIN) 81 MG EC tablet TAKE 1 TABLET BY MOUTH ONCE DAILY    hydroCHLOROthiazide (HYDRODIURIL) 25 MG tablet     multivitamin capsule Take 1 capsule by mouth once daily.     Family History     None        Tobacco Use    Smoking status: Former Smoker   Substance and Sexual Activity    Alcohol use: Not Currently    Drug use: Not Currently    Sexual activity: Not on file     Review of Systems   Constitutional: Positive for activity change and fatigue.   HENT: Negative.    Respiratory: Negative.    Cardiovascular: Negative.    Gastrointestinal: Negative.    Genitourinary: Negative.    Musculoskeletal: Negative.    Skin: Negative.    Neurological: Negative.    Hematological: Negative.    Psychiatric/Behavioral: Negative.      Objective:     Vital Signs (Most Recent):  Temp: 98.1 °F (36.7 °C) (03/04/20 1919)  Pulse: 67 (03/04/20 1919)  Resp: 18 (03/04/20 1919)  BP: (!) 116/57 (03/04/20 1919)  SpO2: 96 % (03/04/20 1939) Vital Signs (24h Range):  Temp:  [98 °F (36.7 °C)-98.6 °F (37 °C)] 98.1 °F (36.7 °C)  Pulse:  [] 67  Resp:  [16-37] 18  SpO2:  [90 %-96 %] 96 %  BP: (101-145)/(53-79) 116/57     Weight: 49.4 kg (108 lb 14.5 oz)  Body mass index is 19.92 kg/m².    Physical Exam   Constitutional: She is oriented to person, place, and time. She appears well-developed and well-nourished.   Pale looking   HENT:   Head: Normocephalic and atraumatic.   Left Ear: External ear normal.   Nose: Nose normal.   Eyes: Pupils are equal, round, and reactive to light. Conjunctivae and EOM are normal. Right eye exhibits no discharge. Left eye exhibits no discharge. No scleral icterus.   Neck: Normal range of motion. Neck supple. No JVD present. No tracheal deviation present. No thyromegaly present.   Cardiovascular: Normal rate, normal heart sounds and intact distal pulses. Exam reveals no gallop.   No murmur heard.  Pulmonary/Chest: Effort normal and breath  sounds normal. No respiratory distress. She has no wheezes. She exhibits no tenderness.   Abdominal: Soft. Bowel sounds are normal. She exhibits no distension.   Musculoskeletal: Normal range of motion. She exhibits no edema, tenderness or deformity.   Neurological: She is alert and oriented to person, place, and time. She displays normal reflexes. No cranial nerve deficit. She exhibits normal muscle tone. Coordination normal.   Skin: Skin is warm and dry. Capillary refill takes less than 2 seconds.   Psychiatric: She has a normal mood and affect. Her behavior is normal. Judgment and thought content normal.   Nursing note and vitals reviewed.        CRANIAL NERVES     CN III, IV, VI   Pupils are equal, round, and reactive to light.  Extraocular motions are normal.        Significant Labs:   CBC:   Recent Labs   Lab 03/04/20  1332 03/04/20  1400 03/04/20  1955   WBC 6.19  --  8.23  8.23   HGB 6.9*  --  8.3*  8.3*   HCT 22.8* 20* 26.3*  26.3*   PLT 21*  --  20*  20*       Significant Imaging: I have reviewed all pertinent imaging results/findings within the past 24 hours.    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         VTE Risk Mitigation (From admission, onward)         Ordered     IP VTE HIGH RISK PATIENT  Once      03/04/20 1811     Place sequential compression device  Until discontinued      03/04/20 1811     Place MERLY hose  Until discontinued      03/04/20 1811                   Orquidea Garcia MD  Department of Hospital Medicine   [facility]

## 2020-03-05 NOTE — ASSESSMENT & PLAN NOTE
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

## 2020-03-05 NOTE — H&P
[facility] Hospital Medicine  History & Physical    Patient Name: Amena Wick  MRN: [id number]  Admission Date: 3/4/2020  Attending Physician: Orquidea Garcia MD  Primary Care Provider: Thai Her MD         Patient information was obtained from patient and ER records.     Subjective:     Principal Problem:Symptomatic anemia    Chief Complaint:   Chief Complaint   Patient presents with    Shortness of Breath     Per EMS, family reports pateint appears short of breath. Room air sat was 88%, upon arrival to ED sat of 92% on 2L.         HPI: Amena Nash is a 88 YOCF with PMH of dementia who presented to the ED via EMS accompanied by her niece c/o pallor, fatigue and SOB when the pt woke up. . Accordign to the history obtained in the ER , the pt's niece reported several sick contacts at home. Per EMS, the pt presented with an O2 sat of 88% on RA, which improved to 92% on 2L. The pt denies fever, rhinorrhea, abdominal pain and chest pain. She does not use supplemental O2 at home. She does not smoke cigarettes, consume EtOH or use illicit drugs. Further hx is limited due to dementia. No prior tx.      At the time of my examination the pt reported no symptoms, provided the above account and reported that her niece noticed unusual color/pallor on my face  She denied any SOB, palptitations but has recently noticed increased weakness.        Review of patient's allergies indicates:  No Known Allergies    No new subjective & objective note has been filed under this hospital service since the last note was generated.    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         VTE Risk Mitigation (From admission, onward)         Ordered     IP VTE HIGH RISK PATIENT  Once      03/04/20 1424      Place sequential compression device  Until discontinued      03/04/20 1811     Place MERLY hose  Until discontinued      03/04/20 1811                   Orquidea Garcia MD  Department of Hospital Medicine   [facility]

## 2020-03-05 NOTE — HPI
Principal Problem:Symptomatic anemia     Chief Complaint:        Chief Complaint   Patient presents with    Shortness of Breath       Per EMS, family reports pateint appears short of breath. Room air sat was 88%, upon arrival to ED sat of 92% on 2L.          HPI: Amena Nash is a 88 YOCF with PMH of dementia who presented to the ED via EMS accompanied by her niece c/o pallor, fatigue and SOB when the pt woke up. . Accordign to the history obtained in the ER , the pt's niece reported several sick contacts at home. Per EMS, the pt presented with an O2 sat of 88% on RA, which improved to 92% on 2L. The pt denies fever, rhinorrhea, abdominal pain and chest pain. She does not use supplemental O2 at home. She does not smoke cigarettes, consume EtOH or use illicit drugs. Further hx is limited due to dementia. No prior tx.      At the time of my examination the pt reported no symptoms, provided the above account and reported that her niece noticed unusual color/pallor on my face  She denied any SOB, palptitations but has recently noticed increased weakness.

## 2020-03-05 NOTE — SUBJECTIVE & OBJECTIVE
Past Medical History:   Diagnosis Date    Dementia     Encounter for blood transfusion 03/04/2020    no previous transfusion history    Requires assistance with activities of daily living (ADL)     Symptomatic anemia 03/04/2020       Past Surgical History:   Procedure Laterality Date    NO PAST SURGERIES  03/04/2020       Review of patient's allergies indicates:  No Known Allergies    No current facility-administered medications on file prior to encounter.      Current Outpatient Medications on File Prior to Encounter   Medication Sig    aspirin (ECOTRIN) 81 MG EC tablet TAKE 1 TABLET BY MOUTH ONCE DAILY    hydroCHLOROthiazide (HYDRODIURIL) 25 MG tablet     multivitamin capsule Take 1 capsule by mouth once daily.     Family History     None        Tobacco Use    Smoking status: Former Smoker   Substance and Sexual Activity    Alcohol use: Not Currently    Drug use: Not Currently    Sexual activity: Not on file     Review of Systems   Constitutional: Positive for activity change and fatigue.   HENT: Negative.    Respiratory: Negative.    Cardiovascular: Negative.    Gastrointestinal: Negative.    Genitourinary: Negative.    Musculoskeletal: Negative.    Skin: Negative.    Neurological: Negative.    Hematological: Negative.    Psychiatric/Behavioral: Negative.      Objective:     Vital Signs (Most Recent):  Temp: 98.1 °F (36.7 °C) (03/04/20 1919)  Pulse: 67 (03/04/20 1919)  Resp: 18 (03/04/20 1919)  BP: (!) 116/57 (03/04/20 1919)  SpO2: 96 % (03/04/20 1939) Vital Signs (24h Range):  Temp:  [98 °F (36.7 °C)-98.6 °F (37 °C)] 98.1 °F (36.7 °C)  Pulse:  [] 67  Resp:  [16-37] 18  SpO2:  [90 %-96 %] 96 %  BP: (101-145)/(53-79) 116/57     Weight: 49.4 kg (108 lb 14.5 oz)  Body mass index is 19.92 kg/m².    Physical Exam   Constitutional: She is oriented to person, place, and time. She appears well-developed and well-nourished.   Pale looking   HENT:   Head: Normocephalic and atraumatic.   Left Ear: External  ear normal.   Nose: Nose normal.   Eyes: Pupils are equal, round, and reactive to light. Conjunctivae and EOM are normal. Right eye exhibits no discharge. Left eye exhibits no discharge. No scleral icterus.   Neck: Normal range of motion. Neck supple. No JVD present. No tracheal deviation present. No thyromegaly present.   Cardiovascular: Normal rate, normal heart sounds and intact distal pulses. Exam reveals no gallop.   No murmur heard.  Pulmonary/Chest: Effort normal and breath sounds normal. No respiratory distress. She has no wheezes. She exhibits no tenderness.   Abdominal: Soft. Bowel sounds are normal. She exhibits no distension.   Musculoskeletal: Normal range of motion. She exhibits no edema, tenderness or deformity.   Neurological: She is alert and oriented to person, place, and time. She displays normal reflexes. No cranial nerve deficit. She exhibits normal muscle tone. Coordination normal.   Skin: Skin is warm and dry. Capillary refill takes less than 2 seconds.   Psychiatric: She has a normal mood and affect. Her behavior is normal. Judgment and thought content normal.   Nursing note and vitals reviewed.        CRANIAL NERVES     CN III, IV, VI   Pupils are equal, round, and reactive to light.  Extraocular motions are normal.        Significant Labs:   CBC:   Recent Labs   Lab 03/04/20  1332 03/04/20  1400 03/04/20  1955   WBC 6.19  --  8.23  8.23   HGB 6.9*  --  8.3*  8.3*   HCT 22.8* 20* 26.3*  26.3*   PLT 21*  --  20*  20*       Significant Imaging: I have reviewed all pertinent imaging results/findings within the past 24 hours.

## 2020-03-05 NOTE — SUBJECTIVE & OBJECTIVE
Past Medical History:   Diagnosis Date    Dementia     Encounter for blood transfusion 03/04/2020    no previous transfusion history    Requires assistance with activities of daily living (ADL)     Symptomatic anemia 03/04/2020       Past Surgical History:   Procedure Laterality Date    NO PAST SURGERIES  03/04/2020       Review of patient's allergies indicates:  No Known Allergies    Medications:  Continuous Infusions:   lactated ringers 50 mL/hr at 03/04/20 1933     Scheduled Meds:   aspirin  81 mg Oral Daily    furosemide  40 mg Oral Daily    pantoprazole  40 mg Oral Daily     PRN Meds:sodium chloride, sodium chloride, sodium chloride, sodium chloride 0.9%    Family History     None        Tobacco Use    Smoking status: Former Smoker   Substance and Sexual Activity    Alcohol use: Not Currently    Drug use: Not Currently    Sexual activity: Not on file       Review of Systems   Unable to perform ROS: Dementia     Objective:     Vital Signs (Most Recent):  Temp: 98.4 °F (36.9 °C) (03/05/20 1421)  Pulse: 99 (03/05/20 1421)  Resp: 18 (03/05/20 1421)  BP: 115/61 (03/05/20 1421)  SpO2: 95 % (03/05/20 1421) Vital Signs (24h Range):  Temp:  [97.6 °F (36.4 °C)-98.6 °F (37 °C)] 98.4 °F (36.9 °C)  Pulse:  [] 99  Resp:  [16-18] 18  SpO2:  [87 %-96 %] 95 %  BP: (103-145)/(54-79) 115/61     Weight: 49.4 kg (108 lb 14.5 oz)  Body mass index is 19.92 kg/m².    Review of Symptoms  Symptom Assessment (ESAS 0-10 scale)   ESAS 0 1 2 3 4 5 6 7 8 9 10   Pain              Dyspnea              Anxiety              Nausea              Depression               Anorexia              Fatigue              Insomnia              Restlessness               Agitation                  Physical Exam   Constitutional: She appears well-developed and well-nourished.   HENT:   Head: Normocephalic and atraumatic.   Neck: Normal range of motion.   Cardiovascular: Normal rate.   Pulmonary/Chest: Breath sounds normal.   Oxygen  on, breathing labored    Abdominal: Soft. Bowel sounds are normal.   Musculoskeletal: She exhibits no edema.   Neurological: She is alert.   Limited by dementia   Skin: Skin is warm and dry.   Nursing note and vitals reviewed.      Significant Labs: All pertinent labs within the past 24 hours have been reviewed.  CBC:   Recent Labs   Lab 03/05/20  0145   WBC 7.02   HGB 7.9*   HCT 24.4*   MCV 93   PLT 20*     BMP:  Recent Labs   Lab 03/05/20  0145         K 3.8      CO2 27   BUN 14   CREATININE 0.8   CALCIUM 7.9*     LFT:  Lab Results   Component Value Date    AST 59 (H) 03/05/2020    ALKPHOS 180 (H) 03/05/2020    BILITOT 1.5 (H) 03/05/2020     Albumin:   Albumin   Date Value Ref Range Status   03/05/2020 2.7 (L) 3.5 - 5.2 g/dL Final     Protein:   Total Protein   Date Value Ref Range Status   03/05/2020 5.1 (L) 6.0 - 8.4 g/dL Final     Lactic acid:   Lab Results   Component Value Date    LACTATE 1.7 03/04/2020       Significant Imaging: I have reviewed all pertinent imaging results/findings within the past 24 hours.    Advance Care Planning   Advanced Directives::  Living Will: No  LaPOST: No  Do Not Resuscitate Status: patient is a full code  Medical Power of : No    Decision-Making Capacity: Patient unable to communicate due to disease severity/cognitive impairment       Living Arrangements: Lives with Niece    Patient sitting in chair receiving transfusion PRBC's. She is alert but unable to tell me her name, birthday or where she is. She does not remember who she lives with and when I prompted her about is it here her niece she states she does remember but think she is redheaded. Patient clearly lacks capacity to make decisions and no family in room. Per Dr Garcia and noted per ED that niece is helping in decision making.

## 2020-03-05 NOTE — NURSING
Patient and her niece are requesting for patient to eat and drink. And also, they do not want a GI consult. Dr. Garcia notified. Orders received for patient to eat.

## 2020-03-05 NOTE — CONSULTS
.[facility]  Gastroenterology  Consult Note    Patient Name: Amena Wick  MRN: [id number]  Admission Date: 3/4/2020  Hospital Length of Stay: 1 days  Code Status: Full Code   Primary Care Physician: Thai Her MD  Principal Problem:Symptomatic anemia    Inpatient consult to Gastroenterology  Consult performed by: Bethany Larson PA-C  Consult ordered by: Jared Vo MD        Subjective:     Chief complaint: Shortness of breath.    HPI: The patient is an 88 year old female with a history of Alzheimer's dementia presenting with symptomatic anemia.  History is primarily taken from patient's niece as the patient has dementia.  She reports progressive shortness of breath and fatigue over the last few days, prompting presentation to the ED.  Found to have acute, severe, normocytic anemia and thrombocytopenia on arrival.  No clear precipitants.  Niece denies history of overt GI bleeding, including hematemesis, melena or hematochezia.  No history of abdominal pain.  Does endorse weight loss despite adequate po intake.  No heavy NSAID use.  Endoscopic history is unknown.    Past medical history:  Alzheimer's dementia.    Past surgical history:  Denies.    Social history:  Tobacco use: denies.  Alcohol use: denies.  Illicit drug use: denies.    Family history:  No family history of liver or colon cancer.    Medications:  Medications Prior to Admission   Medication Sig Dispense Refill Last Dose    aspirin (ECOTRIN) 81 MG EC tablet TAKE 1 TABLET BY MOUTH ONCE DAILY   3/4/2020 at Unknown time    hydroCHLOROthiazide (HYDRODIURIL) 25 MG tablet    3/4/2020 at Unknown time    multivitamin capsule Take 1 capsule by mouth once daily.   3/4/2020 at Unknown time       Allergies:  No known drug allergies.    Review of systems:  CONSTITUTIONAL: Positive for weight loss, weakness. No fever or chills.  HEENT: Negative for blurred vision, hearing loss, nasal congestion, dry mouth, sore  throat.  CARDIOVASCULAR: Negative for chest pain or palpitations.  RESPIRATORY: Positive for SOB. No cough.  GASTROINTESTINAL: See HPI  GENITOURINARY: Negative for dysuria or hematuria.  MUSCULOSKELETAL: Negative for osteoarthritis or muscle pain.  SKIN: Negative for rashes/lesions.  NEUROLOGIC: Negative for headaches, numbness/tingling.  ENDOCRINE: Negative for diabetes or thyroid abnormalities.  HEMATOLOGIC: Negative for anemia or blood dyscrasias.    Objective:     Vital Signs (Most Recent):  Temp: 98.2 °F (36.8 °C) (03/05/20 1513)  Pulse: 83 (03/05/20 1513)  Resp: 19 (03/05/20 1513)  BP: (!) 115/58 (03/05/20 1513)  SpO2: 97 % (03/05/20 1513) Vital Signs (24h Range):  Temp:  [97.6 °F (36.4 °C)-98.6 °F (37 °C)] 98.2 °F (36.8 °C)  Pulse:  [] 83  Resp:  [16-19] 19  SpO2:  [87 %-97 %] 97 %  BP: (103-145)/(54-79) 115/58     Physical examination:  General: Elderly WF in no apparent distress.  HENT: NCAT, atraumatic, hearing grossly intact, no visible or palpable thyroid mass  Eyes: PERRL, EOMI, anicteric sclera  Cardiovascular: Regular rate and rhythm. No peripheral edema.   Lungs: Non-labored respirations. Breath sounds equal.   Abdomen: soft, NTND, normoactive BS  Extremities: No C/C, 2+ dorsalis pedis pulses bilaterally  Neuro: AA&O x 3, no asterixes or tremors  Psych: Appropriate mood and affect. No SI.  Skin: No jaundice, rashes or lesions  Musculoskeletal: 5/5 strength bilaterally    CBC:   Recent Labs   Lab 03/04/20  1332 03/04/20  1400 03/04/20  1955 03/05/20  0145   WBC 6.19  --  8.23  8.23 7.02   HGB 6.9*  --  8.3*  8.3* 7.9*   HCT 22.8* 20* 26.3*  26.3* 24.4*   PLT 21*  --  20*  20* 20*     CMP:   Recent Labs   Lab 03/05/20  0145      CALCIUM 7.9*   ALBUMIN 2.7*   PROT 5.1*      K 3.8   CO2 27      BUN 14   CREATININE 0.8   ALKPHOS 180*   ALT 63*   AST 59*   BILITOT 1.5*     Coagulation:   Recent Labs   Lab 03/04/20  1332 03/04/20  1421   INR 1.8*  --    APTT  --  32.5*        Assessment:   88 year old female with a history of Alzheimer's dementia presenting with acute, severe, macrocytic anemia and thrombocytopenia.  Etiology unclear.  No overt bleeding.  INR, transaminases and bilirubin elevated as well.  ??underlying liver disease.  BUN ok and vitals stable, suggesting against brisk UGIB.    Plan:   Long discussion with patient's niece regarding further workup and treatment options.  Given patient's advanced age, lack of overt bleeding and significant thrombocytopenia, would not recommend invasive endoscopic procedures.  Niece would like conservative measures only.  Recommend transfusing PRBCs and platelets and monitoring CBC.  Also discussed obtaining an ultrasound to assess her liver given lab findings; however, niprincess does not wish for any other testing at this time.  Nothing else to add from our perspective at this juncture.  Please call back if needed.        Thank you for your consult.     Bethany Larson PA-C  Gastroenterology  [facility]

## 2020-03-05 NOTE — HPI
Amena Nash is a 88 YOCF with PMH of dementia who presented to the ED via EMS accompanied by her niece c/o pallor, fatigue and SOB when the pt woke up. . Accordign to the history obtained in the ER , the pt's niece reported several sick contacts at home. Per EMS, the pt presented with an O2 sat of 88% on RA, which improved to 92% on 2L. The pt denies fever, rhinorrhea, abdominal pain and chest pain. She does not use supplemental O2 at home. She does not smoke cigarettes, consume EtOH or use illicit drugs. Further hx is limited due to dementia. No prior tx.      At the time of my examination the pt reported no symptoms, provided the above account and reported that her niece noticed unusual color/pallor on my face  She denied any SOB, palptitations but has recently noticed increased weakness.        Review of patient's allergies indicates:  No Known Allergies

## 2020-03-05 NOTE — HOSPITAL COURSE
"S/P [facility] of PRBC with subsequent improvement in HnH to 8 from 6.9.     3/5/2020  Improved hemoglobin  Continue to have thrombocytopenia  Niece is on the bed side and although is not POA, refused to be treated for suspected blood dyscrasiaS.  The niece also suspects "Vampires" after her.   Continue to have shortness of breath on minimal exertion    3/5/2020  Baseline dementia  Concerns for DIC, liver disease   Ordered ID work up and liver US     3/6/2020  Seen by Heme/Onc; Suspect DIC,ordered work up   Niece not in agreement for more blood work  The pt has a sister who recommends all her care gone through the niece who is a retired nurse.     3/7/2020  Continue to have oxygen requirement at 2 LPM   Continue on ASA and Lasix (For pulmonary congestion)  3/8/2020  The pt is demented  Sleepy this AM  Spoke with the family member   They want to do investigations and treat her  GI consulted  CT ABD/PELVIS WIth contrast to order   End stage dementia    3/9/2020  Pt seen this AM   No family members on bed side  The pt waking up with verbal stimuli   Withdrawn   The CT Chest/abd/pelvis with cystic lesions in the mediastinal lymph nodes, liver and spleen    3/10- pt and family met with palliative care today- pt is DNR.  Dc home tomorrow with home hospice/passages   3/11 - pt dc'd with hospice     "

## 2020-03-06 PROBLEM — R79.1 ELEVATED INR: Status: ACTIVE | Noted: 2020-03-06

## 2020-03-06 PROBLEM — R09.02 HYPOXIA: Status: RESOLVED | Noted: 2020-03-06 | Resolved: 2020-03-06

## 2020-03-06 PROBLEM — R74.8 ELEVATED LIVER ENZYMES: Status: ACTIVE | Noted: 2020-03-06

## 2020-03-06 PROBLEM — R09.02 HYPOXIA: Status: ACTIVE | Noted: 2020-03-06

## 2020-03-06 PROBLEM — F03.918 DEMENTIA WITH BEHAVIORAL DISTURBANCE: Status: ACTIVE | Noted: 2020-02-23

## 2020-03-06 PROBLEM — D65 DIC (DISSEMINATED INTRAVASCULAR COAGULATION): Status: ACTIVE | Noted: 2020-03-06

## 2020-03-06 LAB
ALBUMIN SERPL BCP-MCNC: 2.7 G/DL (ref 3.5–5.2)
ALP SERPL-CCNC: 216 U/L (ref 55–135)
ALT SERPL W/O P-5'-P-CCNC: 56 U/L (ref 10–44)
ANION GAP SERPL CALC-SCNC: 8 MMOL/L (ref 8–16)
ANISOCYTOSIS BLD QL SMEAR: SLIGHT
AST SERPL-CCNC: 62 U/L (ref 10–40)
BACTERIA #/AREA URNS HPF: ABNORMAL /HPF
BASOPHILS # BLD AUTO: 0.01 K/UL (ref 0–0.2)
BASOPHILS # BLD AUTO: 0.02 K/UL (ref 0–0.2)
BASOPHILS NFR BLD: 0.2 % (ref 0–1.9)
BASOPHILS NFR BLD: 0.3 % (ref 0–1.9)
BILIRUB SERPL-MCNC: 2.4 MG/DL (ref 0.1–1)
BILIRUB UR QL STRIP: NEGATIVE
BUN SERPL-MCNC: 15 MG/DL (ref 8–23)
CALCIUM SERPL-MCNC: 7.5 MG/DL (ref 8.7–10.5)
CHLORIDE SERPL-SCNC: 103 MMOL/L (ref 95–110)
CLARITY UR: CLEAR
CO2 SERPL-SCNC: 27 MMOL/L (ref 23–29)
COLOR UR: ABNORMAL
CREAT SERPL-MCNC: 0.9 MG/DL (ref 0.5–1.4)
DAT IGG-SP REAG RBC-IMP: NORMAL
DIFFERENTIAL METHOD: ABNORMAL
DIFFERENTIAL METHOD: ABNORMAL
EOSINOPHIL # BLD AUTO: 0 K/UL (ref 0–0.5)
EOSINOPHIL # BLD AUTO: 0.1 K/UL (ref 0–0.5)
EOSINOPHIL NFR BLD: 0.7 % (ref 0–8)
EOSINOPHIL NFR BLD: 1.3 % (ref 0–8)
ERYTHROCYTE [DISTWIDTH] IN BLOOD BY AUTOMATED COUNT: 19.4 % (ref 11.5–14.5)
ERYTHROCYTE [DISTWIDTH] IN BLOOD BY AUTOMATED COUNT: 19.6 % (ref 11.5–14.5)
EST. GFR  (AFRICAN AMERICAN): >60 ML/MIN/1.73 M^2
EST. GFR  (NON AFRICAN AMERICAN): 57 ML/MIN/1.73 M^2
FIBRINOGEN PPP-MCNC: 73 MG/DL (ref 182–366)
GLUCOSE SERPL-MCNC: 109 MG/DL (ref 70–110)
GLUCOSE UR QL STRIP: NEGATIVE
HCT VFR BLD AUTO: 24.8 % (ref 37–48.5)
HCT VFR BLD AUTO: 27.2 % (ref 37–48.5)
HGB BLD-MCNC: 7.7 G/DL (ref 12–16)
HGB BLD-MCNC: 8.5 G/DL (ref 12–16)
HGB UR QL STRIP: ABNORMAL
IMM GRANULOCYTES # BLD AUTO: 0.04 K/UL (ref 0–0.04)
IMM GRANULOCYTES # BLD AUTO: 0.05 K/UL (ref 0–0.04)
IMM GRANULOCYTES NFR BLD AUTO: 0.7 % (ref 0–0.5)
IMM GRANULOCYTES NFR BLD AUTO: 0.7 % (ref 0–0.5)
IRON SERPL-MCNC: 30 UG/DL (ref 30–160)
KETONES UR QL STRIP: NEGATIVE
LDH SERPL L TO P-CCNC: 301 U/L (ref 110–260)
LEUKOCYTE ESTERASE UR QL STRIP: ABNORMAL
LYMPHOCYTES # BLD AUTO: 1 K/UL (ref 1–4.8)
LYMPHOCYTES # BLD AUTO: 1 K/UL (ref 1–4.8)
LYMPHOCYTES NFR BLD: 14.8 % (ref 18–48)
LYMPHOCYTES NFR BLD: 16.4 % (ref 18–48)
MCH RBC QN AUTO: 29.2 PG (ref 27–31)
MCH RBC QN AUTO: 29.4 PG (ref 27–31)
MCHC RBC AUTO-ENTMCNC: 31 G/DL (ref 32–36)
MCHC RBC AUTO-ENTMCNC: 31.3 G/DL (ref 32–36)
MCV RBC AUTO: 94 FL (ref 82–98)
MCV RBC AUTO: 94 FL (ref 82–98)
MICROSCOPIC COMMENT: ABNORMAL
MONOCYTES # BLD AUTO: 0.6 K/UL (ref 0.3–1)
MONOCYTES # BLD AUTO: 0.6 K/UL (ref 0.3–1)
MONOCYTES NFR BLD: 9 % (ref 4–15)
MONOCYTES NFR BLD: 9.2 % (ref 4–15)
NEUTROPHILS # BLD AUTO: 4.5 K/UL (ref 1.8–7.7)
NEUTROPHILS # BLD AUTO: 5 K/UL (ref 1.8–7.7)
NEUTROPHILS NFR BLD: 73 % (ref 38–73)
NEUTROPHILS NFR BLD: 73.7 % (ref 38–73)
NITRITE UR QL STRIP: NEGATIVE
NRBC BLD-RTO: 2 /100 WBC
NRBC BLD-RTO: 2 /100 WBC
PATH REV BLD -IMP: NORMAL
PH UR STRIP: 6 [PH] (ref 5–8)
PLATELET # BLD AUTO: 33 K/UL (ref 150–350)
PLATELET # BLD AUTO: 36 K/UL (ref 150–350)
PMV BLD AUTO: 12 FL (ref 9.2–12.9)
PMV BLD AUTO: 12.3 FL (ref 9.2–12.9)
POLYCHROMASIA BLD QL SMEAR: ABNORMAL
POTASSIUM SERPL-SCNC: 3.3 MMOL/L (ref 3.5–5.1)
PROT SERPL-MCNC: 5.3 G/DL (ref 6–8.4)
PROT UR QL STRIP: NEGATIVE
RBC # BLD AUTO: 2.64 M/UL (ref 4–5.4)
RBC # BLD AUTO: 2.89 M/UL (ref 4–5.4)
RBC #/AREA URNS HPF: 5 /HPF (ref 0–4)
SATURATED IRON: 10 % (ref 20–50)
SODIUM SERPL-SCNC: 138 MMOL/L (ref 136–145)
SP GR UR STRIP: 1.01 (ref 1–1.03)
TOTAL IRON BINDING CAPACITY: 287 UG/DL (ref 250–450)
TRANSFERRIN SERPL-MCNC: 194 MG/DL (ref 200–375)
URN SPEC COLLECT METH UR: ABNORMAL
UROBILINOGEN UR STRIP-ACNC: ABNORMAL EU/DL
WBC # BLD AUTO: 6.1 K/UL (ref 3.9–12.7)
WBC # BLD AUTO: 6.76 K/UL (ref 3.9–12.7)
WBC #/AREA URNS HPF: 15 /HPF (ref 0–5)

## 2020-03-06 PROCEDURE — 86920 COMPATIBILITY TEST SPIN: CPT

## 2020-03-06 PROCEDURE — 99231 PR SUBSEQUENT HOSPITAL CARE,LEVL I: ICD-10-PCS | Mod: ,,, | Performed by: NURSE PRACTITIONER

## 2020-03-06 PROCEDURE — 63600175 PHARM REV CODE 636 W HCPCS: Performed by: INTERNAL MEDICINE

## 2020-03-06 PROCEDURE — 86900 BLOOD TYPING SEROLOGIC ABO: CPT

## 2020-03-06 PROCEDURE — 87088 URINE BACTERIA CULTURE: CPT

## 2020-03-06 PROCEDURE — 82746 ASSAY OF FOLIC ACID SERUM: CPT

## 2020-03-06 PROCEDURE — 80053 COMPREHEN METABOLIC PANEL: CPT

## 2020-03-06 PROCEDURE — 80074 ACUTE HEPATITIS PANEL: CPT

## 2020-03-06 PROCEDURE — 86235 NUCLEAR ANTIGEN ANTIBODY: CPT | Mod: 59

## 2020-03-06 PROCEDURE — 99223 PR INITIAL HOSPITAL CARE,LEVL III: ICD-10-PCS | Mod: ,,, | Performed by: INTERNAL MEDICINE

## 2020-03-06 PROCEDURE — 21400001 HC TELEMETRY ROOM

## 2020-03-06 PROCEDURE — 90792 PSYCH DIAG EVAL W/MED SRVCS: CPT | Mod: ,,, | Performed by: PSYCHIATRY & NEUROLOGY

## 2020-03-06 PROCEDURE — 25000003 PHARM REV CODE 250: Performed by: INTERNAL MEDICINE

## 2020-03-06 PROCEDURE — 87040 BLOOD CULTURE FOR BACTERIA: CPT

## 2020-03-06 PROCEDURE — 86703 HIV-1/HIV-2 1 RESULT ANTBDY: CPT

## 2020-03-06 PROCEDURE — 83615 LACTATE (LD) (LDH) ENZYME: CPT

## 2020-03-06 PROCEDURE — 85025 COMPLETE CBC W/AUTO DIFF WBC: CPT

## 2020-03-06 PROCEDURE — 87086 URINE CULTURE/COLONY COUNT: CPT

## 2020-03-06 PROCEDURE — 99231 SBSQ HOSP IP/OBS SF/LOW 25: CPT | Mod: ,,, | Performed by: NURSE PRACTITIONER

## 2020-03-06 PROCEDURE — 81000 URINALYSIS NONAUTO W/SCOPE: CPT

## 2020-03-06 PROCEDURE — 83010 ASSAY OF HAPTOGLOBIN QUANT: CPT

## 2020-03-06 PROCEDURE — 36415 COLL VENOUS BLD VENIPUNCTURE: CPT

## 2020-03-06 PROCEDURE — 83540 ASSAY OF IRON: CPT

## 2020-03-06 PROCEDURE — 90792 PR PSYCHIATRIC DIAGNOSTIC EVALUATION W/MEDICAL SERVICES: ICD-10-PCS | Mod: ,,, | Performed by: PSYCHIATRY & NEUROLOGY

## 2020-03-06 PROCEDURE — 99223 1ST HOSP IP/OBS HIGH 75: CPT | Mod: ,,, | Performed by: INTERNAL MEDICINE

## 2020-03-06 PROCEDURE — 85240 CLOT FACTOR VIII AHG 1 STAGE: CPT

## 2020-03-06 PROCEDURE — 85060 PATHOLOGIST REVIEW: ICD-10-PCS | Mod: ,,, | Performed by: PATHOLOGY

## 2020-03-06 PROCEDURE — 87077 CULTURE AEROBIC IDENTIFY: CPT

## 2020-03-06 PROCEDURE — 86038 ANTINUCLEAR ANTIBODIES: CPT

## 2020-03-06 PROCEDURE — 82607 VITAMIN B-12: CPT

## 2020-03-06 PROCEDURE — 86880 COOMBS TEST DIRECT: CPT

## 2020-03-06 PROCEDURE — 86039 ANTINUCLEAR ANTIBODIES (ANA): CPT

## 2020-03-06 PROCEDURE — 85384 FIBRINOGEN ACTIVITY: CPT

## 2020-03-06 PROCEDURE — 85060 BLOOD SMEAR INTERPRETATION: CPT | Mod: ,,, | Performed by: PATHOLOGY

## 2020-03-06 PROCEDURE — 86850 RBC ANTIBODY SCREEN: CPT

## 2020-03-06 PROCEDURE — 86901 BLOOD TYPING SEROLOGIC RH(D): CPT

## 2020-03-06 PROCEDURE — 87186 SC STD MICRODIL/AGAR DIL: CPT

## 2020-03-06 PROCEDURE — 63600175 PHARM REV CODE 636 W HCPCS: Performed by: EMERGENCY MEDICINE

## 2020-03-06 RX ADMIN — ASPIRIN 81 MG: 81 TABLET, COATED ORAL at 09:03

## 2020-03-06 RX ADMIN — PANTOPRAZOLE SODIUM 40 MG: 40 TABLET, DELAYED RELEASE ORAL at 09:03

## 2020-03-06 RX ADMIN — SODIUM CHLORIDE, SODIUM LACTATE, POTASSIUM CHLORIDE, AND CALCIUM CHLORIDE: .6; .31; .03; .02 INJECTION, SOLUTION INTRAVENOUS at 05:03

## 2020-03-06 RX ADMIN — FUROSEMIDE 40 MG: 40 TABLET ORAL at 09:03

## 2020-03-06 RX ADMIN — CEFTRIAXONE 1 G: 1 INJECTION, SOLUTION INTRAVENOUS at 09:03

## 2020-03-06 NOTE — PLAN OF CARE
Problem: Fall Injury Risk  Goal: Absence of Fall and Fall-Related Injury  Intervention: Identify and Manage Contributors to Fall Injury Risk  Flowsheets (Taken 3/5/2020 1932)  Self-Care Promotion: independence encouraged; meal setup provided; safe use of adaptive equipment encouraged  Medication Review/Management: medications reviewed     Problem: Fall Injury Risk  Goal: Absence of Fall and Fall-Related Injury  Intervention: Promote Injury-Free Environment  Flowsheets (Taken 3/5/2020 1932)  Safety Promotion/Fall Prevention: assistive device/personal item within reach; bed alarm set; Fall Risk reviewed with patient/family; Fall Risk signage in place; family to remain at bedside; nonskid shoes/socks when out of bed; room near unit station; side rails raised x 2; instructed to call staff for mobility  Environmental Safety Modification: assistive device/personal items within reach; clutter free environment maintained; room near unit station; room organization consistent

## 2020-03-06 NOTE — NURSING
1635 - patient became aggitated while sitting in chair, swinging at staff and cursing. Patient assisted back to bed with help of staff. Patient calm and cooperative after getting back in bed. Dr. Garcia notified.   1642 - Dr. Garcia at bedside.

## 2020-03-06 NOTE — HPI
Patient Amena Wick presents to the hospital with fatigue and shortness of breath.  Consult was placed for dementia and agitation.  Ago to patient's room and she is pleasantly confused.  She is not oriented at all.  She is not able to tell me her name.  When I give her her name, she smiles and says yes, that is my name.  A tell her that she is in the hospital and she does not believe me.  She tells me that she lives with 1 and 2.  She shows no aggression or agitation towards me.  She smiles frequently during our conversation and shakes my hand when I entered the room and when I leave.  She is also noted to be picking up the bed sheets as if she is looking for something.  I asked what she is doing and she says fixing this and holds up 1 edge of the bed sheet.  Prior admission records indicate that patient is not able to recognize family members and can at times get agitated and physical towards them.  This has not been noted on this hospitalization.

## 2020-03-06 NOTE — SUBJECTIVE & OBJECTIVE
Interval History: Pt reported feeling better. However, due to baseline dementia she is not aware as to what is going on with her.       Review of Systems   Constitutional: Negative.    HENT: Negative.    Respiratory: Negative.    Cardiovascular: Negative.    Gastrointestinal: Negative.    Psychiatric/Behavioral: Positive for behavioral problems and confusion.     Objective:     Vital Signs (Most Recent):  Temp: 98.7 °F (37.1 °C) (03/05/20 1850)  Pulse: 84 (03/05/20 1850)  Resp: 18 (03/05/20 1850)  BP: 122/60 (03/05/20 1850)  SpO2: (!) 93 % (03/05/20 1850) Vital Signs (24h Range):  Temp:  [97.6 °F (36.4 °C)-98.7 °F (37.1 °C)] 98.7 °F (37.1 °C)  Pulse:  [] 84  Resp:  [16-19] 18  SpO2:  [84 %-97 %] 93 %  BP: (103-125)/(54-67) 122/60     Weight: 49.4 kg (108 lb 14.5 oz)  Body mass index is 19.92 kg/m².    Intake/Output Summary (Last 24 hours) at 3/5/2020 1947  Last data filed at 3/5/2020 1850  Gross per 24 hour   Intake 930 ml   Output --   Net 930 ml      Physical Exam   Constitutional: She appears well-developed and well-nourished.   HENT:   Head: Normocephalic and atraumatic.   Left Ear: External ear normal.   Nose: Nose normal.   Eyes: Pupils are equal, round, and reactive to light. Conjunctivae and EOM are normal.   Neck: Normal range of motion. Neck supple.   Cardiovascular: Normal rate, regular rhythm and intact distal pulses.   Pulmonary/Chest: Effort normal and breath sounds normal.   Abdominal: Soft. Bowel sounds are normal.   Musculoskeletal: Normal range of motion.   Neurological: She is alert. She displays normal reflexes. No cranial nerve deficit or sensory deficit.   Disoriented    Skin: Skin is warm and dry. Capillary refill takes less than 2 seconds. No rash noted. No erythema.   Psychiatric: She has a normal mood and affect.   Nursing note and vitals reviewed.      Significant Labs:   BMP:   Recent Labs   Lab 03/05/20  0145         K 3.8      CO2 27   BUN 14   CREATININE 0.8    CALCIUM 7.9*     CBC:   Recent Labs   Lab 03/04/20  1332 03/04/20  1400 03/04/20  1955 03/05/20  0145   WBC 6.19  --  8.23  8.23 7.02   HGB 6.9*  --  8.3*  8.3* 7.9*   HCT 22.8* 20* 26.3*  26.3* 24.4*   PLT 21*  --  20*  20* 20*       Significant Imaging: I have reviewed all pertinent imaging results/findings within the past 24 hours.

## 2020-03-06 NOTE — NURSING
Patient's niece Kenn reported to nurse that she feels that patient's blood is being stolen by a needle out of patient's arm. She feels that EMS may be stealing the patient's blood. Dr. Garcia notified.

## 2020-03-06 NOTE — NURSING
Patient's niece Kenn stated that she does not want any more blood draws until the morning. Instructed niece that next blood draw will not be until the morning. Niece verbalized understanding.

## 2020-03-06 NOTE — CONSULTS
[facility]  Psychiatry  Consult Note    Patient Name: Amena Wick  MRN: [id number]   Code Status: Full Code  Admission Date: 3/4/2020  Hospital Length of Stay: 2 days  Attending Physician: Orquidea Garcia MD  Primary Care Provider: Thai Her MD    Current Legal Status: N/A    Patient information was obtained from patient, Chart review, nursing, and ER records.   Inpatient consult to Psychiatry  Consult performed by: Sd Lambert MD  Consult ordered by: Orquidea Garcia MD        Subjective:     Principal Problem:Symptomatic anemia    Chief Complaint:  Dementia and uncontrolled behaviors     HPI: Patient Amena Wick presents to the hospital with fatigue and shortness of breath.  Consult was placed for dementia and agitation.  Ago to patient's room and she is pleasantly confused.  She is not oriented at all.  She is not able to tell me her name.  When I give her her name, she smiles and says yes, that is my name.  A tell her that she is in the hospital and she does not believe me.  She tells me that she lives with 1 and 2.  She shows no aggression or agitation towards me.  She smiles frequently during our conversation and shakes my hand when I entered the room and when I leave.  She is also noted to be picking up the bed sheets as if she is looking for something.  I asked what she is doing and she says fixing this and holds up 1 edge of the bed sheet.  Prior admission records indicate that patient is not able to recognize family members and can at times get agitated and physical towards them.  This has not been noted on this hospitalization.    Hospital Course: No notes on file         Patient History           Medical as of 3/6/2020     Past Medical History     Diagnosis Date Comments Source    Dementia -- -- Provider    Encounter for blood transfusion 03/04/2020 no previous transfusion history Provider    Requires assistance with activities of daily living (ADL) -- --  Provider    Symptomatic anemia 03/04/2020 -- Provider                  Surgical as of 3/6/2020     Past Surgical History     Procedure Laterality Date Comments Source    NO PAST SURGERIES -- 03/04/2020 -- Provider                  Family as of 3/6/2020    None           Tobacco Use as of 3/6/2020     Smoking Status Smoking Start Date Smoking Quit Date Packs/Day Years Used    Former Smoker -- -- -- --    Types Comments Smokeless Tobacco Status Smokeless Tobacco Quit Date Source    -- -- Unknown -- Provider            Alcohol Use as of 3/6/2020     Alcohol Use Drinks/Week Alcohol/Week Comments Source    Not Currently -- -- -- Provider    Frequency Standard Drinks Binge Drinking        -- -- --              Drug Use as of 3/6/2020     Drug Use Types Frequency Comments Source    Not Currently -- -- -- Provider            Sexual Activity as of 3/6/2020     Sexually Active Birth Control Partners Comments Source    -- -- -- -- Provider            Activities of Daily Living as of 3/6/2020    None           Social Documentation as of 3/6/2020    None           Occupational as of 3/6/2020    None           Socioeconomic as of 3/6/2020     Marital Status Spouse Name Number of Children Years Education Education Level Preferred Language Ethnicity Race Source     -- -- -- -- English /White White --    Financial Resource Strain Food Insecurity: Worry Food Insecurity: Inability Transportation Needs: Medical Transportation Needs: Non-medical    -- -- -- -- --            Pertinent History     Question Response Comments    Lives with -- --    Place in Birth Order -- --    Lives in -- --    Number of Siblings -- --    Raised by -- --    Legal Involvement -- --    Childhood Trauma -- --    Criminal History of -- --    Financial Status -- --    Highest Level of Education -- --    Does patient have access to a firearm? -- --     Service -- --    Primary Leisure Activity -- --    Spirituality -- --        Past  "Medical History:   Diagnosis Date    Dementia     Encounter for blood transfusion 03/04/2020    no previous transfusion history    Requires assistance with activities of daily living (ADL)     Symptomatic anemia 03/04/2020     Past Surgical History:   Procedure Laterality Date    NO PAST SURGERIES  03/04/2020     Family History     None        Tobacco Use    Smoking status: Former Smoker   Substance and Sexual Activity    Alcohol use: Not Currently    Drug use: Not Currently    Sexual activity: Not on file     Review of patient's allergies indicates:  No Known Allergies    No current facility-administered medications on file prior to encounter.      Current Outpatient Medications on File Prior to Encounter   Medication Sig    aspirin (ECOTRIN) 81 MG EC tablet TAKE 1 TABLET BY MOUTH ONCE DAILY    hydroCHLOROthiazide (HYDRODIURIL) 25 MG tablet     multivitamin capsule Take 1 capsule by mouth once daily.     Psychotherapeutics (From admission, onward)    None        Review of Systems   Unable to perform ROS: Dementia     Strengths and Liabilities: Liability: Patient is impulsive., Liability: Patient has poor health., Liability: Patient has poor judgment, Liability: Patient has possible cognitive impairment.    Objective:     Vital Signs (Most Recent):  Temp: 97.8 °F (36.6 °C) (03/06/20 1501)  Pulse: 69 (03/06/20 1501)  Resp: 19 (03/06/20 1501)  BP: (!) 103/56 (03/06/20 1501)  SpO2: (!) 82 % (03/06/20 1501) Vital Signs (24h Range):  Temp:  [97.5 °F (36.4 °C)-98.7 °F (37.1 °C)] 97.8 °F (36.6 °C)  Pulse:  [] 69  Resp:  [16-19] 19  SpO2:  [81 %-95 %] 82 %  BP: ()/(54-74) 103/56     Height: 5' 2" (157.5 cm)  Weight: 49.1 kg (108 lb 3.9 oz)  Body mass index is 19.8 kg/m².      Intake/Output Summary (Last 24 hours) at 3/6/2020 1530  Last data filed at 3/6/2020 0614  Gross per 24 hour   Intake 961.66 ml   Output --   Net 961.66 ml       Physical Exam   Psychiatric: "   EXAMINATION    CONSTITUTIONAL  General Appearance:  Hospital attire, picking at the bed sheets    MUSCULOSKELETAL  Muscle Strength and Tone:  Normal  Abnormal Involuntary Movements:  None noted  Gait and Station:  Not observed    PSYCHIATRIC MENTAL STATUS EXAM   Level of Consciousness:  Awake and alert  Orientation:  Not oriented to name, month, year, date, situation, place  Grooming:  Limited to hospital setting  Psychomotor Behavior:  Restless and picking at the bed sheets  Speech:  Soft but normal tone, not pressured  Language:  No abnormalities  Mood:  No response, not able to obtain  Affect:  Pleasantly confused  Thought Process:  Poverty  Associations:  Vining and simple  Thought Content:  Not able to obtain  Memory:  Poor to recent and remote  Attention:  Easily distracted  Fund of Knowledge:  Simple to conversation  Insight:  Poor into medical and mental health  Judgment:  Limited into cooperation with care          Significant Labs:   Last 24 Hours:   Recent Lab Results       03/06/20  1300   03/06/20  0909   03/06/20  0606        Albumin     2.7     Alkaline Phosphatase     216     ALT     56     Anion Gap     8     Aniso   Slight       Appearance, UA Clear         AST     62     Bacteria, UA Many         Baso #   0.02 0.01     Basophil%   0.3 0.2     Bilirubin (UA) Negative         BILIRUBIN TOTAL     2.4  Comment:  For infants and newborns, interpretation of results should be based  on gestational age, weight and in agreement with clinical  observations.  Premature Infant recommended reference ranges:  Up to 24 hours.............<8.0 mg/dL  Up to 48 hours............<12.0 mg/dL  3-5 days..................<15.0 mg/dL  6-29 days.................<15.0 mg/dL       BUN, Bld     15     Calcium     7.5     Chloride     103     CO2     27     Color, UA Macy         Creatinine     0.9     Differential Method   Automated Automated     Direct Sammy (HAROON)   NEG       eGFR if      >60     eGFR  if non      57  Comment:  Calculation used to obtain the estimated glomerular filtration  rate (eGFR) is the CKD-EPI equation.        Eos #   0.1 0.0     Eosinophil%   1.3 0.7     Fibrinogen   73  Comment:  FIBRINOGEN critical result(s) called and verbal readback obtained   from CINDY SUTTON.  by [facility] 03/06/2020 10:21         Glucose     109     Glucose, UA Negative         Gran # (ANC)   5.0 4.5     Gran%   73.7 73.0     Hematocrit   27.2 24.8     Hemoglobin   8.5 7.7     Immature Grans (Abs)   0.05  Comment:  Mild elevation in immature granulocytes is non specific and   can be seen in a variety of conditions including stress response,   acute inflammation, trauma and pregnancy. Correlation with other   laboratory and clinical findings is essential.   0.04  Comment:  Mild elevation in immature granulocytes is non specific and   can be seen in a variety of conditions including stress response,   acute inflammation, trauma and pregnancy. Correlation with other   laboratory and clinical findings is essential.       Immature Granulocytes   0.7 0.7     Iron     30     Ketones, UA Negative         LD   301  Comment:  Results are increased in hemolyzed samples.       Leukocytes, UA Trace         Lymph #   1.0 1.0     Lymph%   14.8 16.4     MCH   29.4 29.2     MCHC   31.3 31.0     MCV   94 94     Microscopic Comment SEE COMMENT  Comment:  Other formed elements not mentioned in the report are not   present in the microscopic examination.            Mono #   0.6 0.6     Mono%   9.2 9.0     MPV   12.3 12.0     NITRITE UA Negative         nRBC   2 2     Occult Blood UA 1+         Pathologist Review   Review required       pH, UA 6.0         Platelets   36  Comment:  PLT critical result(s) called and verbal readback obtained from CIDNY SUTTON.  by [facility] 03/06/2020 10:22   33  Comment:  critical result(s) called and verbal readback obtained from JOAO Crawford by [facility] 03/06/2020 07:12       Poly   Occasional        Potassium     3.3     PROTEIN TOTAL     5.3     Protein, UA Negative  Comment:  Recommend a 24 hour urine protein or a urine   protein/creatinine ratio if globulin induced proteinuria is  clinically suspected.           RBC   2.89 2.64     RBC, UA 5         RDW   19.6 19.4     Saturated Iron     10     Sodium     138     Specific Cushing, UA 1.010         Specimen UA Urine, Clean Catch         TIBC     287     Transferrin     194     UROBILINOGEN UA 4.0-6.0         WBC, UA 15         WBC   6.76 6.10         All pertinent labs within the past 24 hours have been reviewed.    Significant Imaging: I have reviewed all pertinent imaging results/findings within the past 24 hours.    Assessment/Plan:     Dementia with behavioral disturbance  Patient has dementia with behavioral component, likely mostly in the evenings which may indicate a sundowning component.  At this stage, it is difficult to tell if this is an Alzheimer's type dementia versus other types because she has already lost remote memory on top of recent memory.  This is a poor prognosis somewhat likely continue to worsen.  This is likely advanced or in stage dementia.  There is no real good treatment at this time for the behavioral component of advanced dementia process that does not come without risk.  Best form of treatment would be with low-dose quetiapine 25 mg nightly but this would increase her risk of sudden cardiac death as per a black box warning of use of antipsychotics in the elderly with dementia.  If her behaviors continue to worsen or become uncontrolled, Depakote 125 mg 3 times daily may help calm some of the impulsiveness.  Consideration may also have to be given to an SSRI which can also limit some of the impulsive behaviors.  If this were to be considered, Lexapro 5 mg nightly may be an option.   I would hold off of medication management at this time until her behaviors become a regular problem.  For the time being, utilize olanzapine 5 mg  p.o./IM every 8 hr p.r.n. acute psychotic or non redirectable agitation.  Also because she is doing some picking of the bed sheets, there may be an underlying delirium that we do not see.  Avoid anticholinergics and benzodiazepines which may cause, contribute to, or worsen a delirium process.  No need for acute inpatient psychiatric hospitalization at this time.         Total Time:  60 minutes      Sd Lambert MD   Psychiatry  [facility]

## 2020-03-06 NOTE — ASSESSMENT & PLAN NOTE
"- Stable, chronic , continue to observe for delirum   - Delirium precautions.   -Psych consulted for today's agitation as the family member on the bed side was forcing her to wear the mask and upon refusal, the patient got agitated.   -Improved afterwards.   -The niece is concerned about the hemoglobin, and thinks "Vampires" are possibly collecting her blood.       "

## 2020-03-06 NOTE — ASSESSMENT & PLAN NOTE
Patient has dementia with behavioral component, likely mostly in the evenings which may indicate a sundowning component.  At this stage, it is difficult to tell if this is an Alzheimer's type dementia versus other types because she has already lost remote memory on top of recent memory.  This is a poor prognosis somewhat likely continue to worsen.  This is likely advanced or in stage dementia.  There is no real good treatment at this time for the behavioral component of advanced dementia process that does not come without risk.  Best form of treatment would be with low-dose quetiapine 25 mg nightly but this would increase her risk of sudden cardiac death as per a black box warning of use of antipsychotics in the elderly with dementia.  If her behaviors continue to worsen or become uncontrolled, Depakote 125 mg 3 times daily may help calm some of the impulsiveness.  Consideration may also have to be given to an SSRI which can also limit some of the impulsive behaviors.  If this were to be considered, Lexapro 5 mg nightly may be an option.   I would hold off of medication management at this time until her behaviors become a regular problem.  For the time being, utilize olanzapine 5 mg p.o./IM every 8 hr p.r.n. acute psychotic or non redirectable agitation.  Also because she is doing some picking of the bed sheets, there may be an underlying delirium that we do not see.  Avoid anticholinergics and benzodiazepines which may cause, contribute to, or worsen a delirium process.  No need for acute inpatient psychiatric hospitalization at this time.

## 2020-03-06 NOTE — PROGRESS NOTES
"[facility] Medicine  Progress Note    Patient Name: Amena Wick  MRN: [id number]  Patient Class: IP- Inpatient   Admission Date: 3/4/2020  Length of Stay: 1 days  Attending Physician: Orquidea Garcia MD  Primary Care Provider: Thai Her MD        Subjective:     Principal Problem:Symptomatic anemia        HPI:  Amena Nash is a 88 YOCF with PMH of dementia who presented to the ED via EMS accompanied by her niece c/o pallor, fatigue and SOB when the pt woke up. . Accordign to the history obtained in the ER , the pt's niece reported several sick contacts at home. Per EMS, the pt presented with an O2 sat of 88% on RA, which improved to 92% on 2L. The pt denies fever, rhinorrhea, abdominal pain and chest pain. She does not use supplemental O2 at home. She does not smoke cigarettes, consume EtOH or use illicit drugs. Further hx is limited due to dementia. No prior tx.      At the time of my examination the pt reported no symptoms, provided the above account and reported that her niece noticed unusual color/pallor on my face  She denied any SOB, palptitations but has recently noticed increased weakness.        Review of patient's allergies indicates:  No Known Allergies    Overview/Hospital Course:  S/P One U of PRBC with subsequent improvement in HnH to 8 from 6.9.     3/5/2020  Improved hemoglobin  Continue to have thrombocytopenia  Niece is on the bed side and although is not POA, refused to be treated for suspected blood dyscrasiaS.  The niece also suspects "Vampires" after her.   Continue to have shortness of breath on minimal exertion      Interval History: Pt reported feeling better. However, due to baseline dementia she is not aware as to what is going on with her.       Review of Systems   Constitutional: Negative.    HENT: Negative.    Respiratory: Negative.    Cardiovascular: Negative.    Gastrointestinal: Negative.    Psychiatric/Behavioral: Positive for " behavioral problems and confusion.     Objective:     Vital Signs (Most Recent):  Temp: 98.7 °F (37.1 °C) (03/05/20 1850)  Pulse: 84 (03/05/20 1850)  Resp: 18 (03/05/20 1850)  BP: 122/60 (03/05/20 1850)  SpO2: (!) 93 % (03/05/20 1850) Vital Signs (24h Range):  Temp:  [97.6 °F (36.4 °C)-98.7 °F (37.1 °C)] 98.7 °F (37.1 °C)  Pulse:  [] 84  Resp:  [16-19] 18  SpO2:  [84 %-97 %] 93 %  BP: (103-125)/(54-67) 122/60     Weight: 49.4 kg (108 lb 14.5 oz)  Body mass index is 19.92 kg/m².    Intake/Output Summary (Last 24 hours) at 3/5/2020 1947  Last data filed at 3/5/2020 1850  Gross per 24 hour   Intake 930 ml   Output --   Net 930 ml      Physical Exam   Constitutional: She appears well-developed and well-nourished.   HENT:   Head: Normocephalic and atraumatic.   Left Ear: External ear normal.   Nose: Nose normal.   Eyes: Pupils are equal, round, and reactive to light. Conjunctivae and EOM are normal.   Neck: Normal range of motion. Neck supple.   Cardiovascular: Normal rate, regular rhythm and intact distal pulses.   Pulmonary/Chest: Effort normal and breath sounds normal.   Abdominal: Soft. Bowel sounds are normal.   Musculoskeletal: Normal range of motion.   Neurological: She is alert. She displays normal reflexes. No cranial nerve deficit or sensory deficit.   Disoriented    Skin: Skin is warm and dry. Capillary refill takes less than 2 seconds. No rash noted. No erythema.   Psychiatric: She has a normal mood and affect.   Nursing note and vitals reviewed.      Significant Labs:   BMP:   Recent Labs   Lab 03/05/20  0145         K 3.8      CO2 27   BUN 14   CREATININE 0.8   CALCIUM 7.9*     CBC:   Recent Labs   Lab 03/04/20  1332 03/04/20  1400 03/04/20 1955 03/05/20  0145   WBC 6.19  --  8.23  8.23 7.02   HGB 6.9*  --  8.3*  8.3* 7.9*   HCT 22.8* 20* 26.3*  26.3* 24.4*   PLT 21*  --  20*  20* 20*       Significant Imaging: I have reviewed all pertinent imaging results/findings within the  "past 24 hours.      Assessment/Plan:      * Symptomatic anemia    -S/P transfusion x 1 Iin the ED, and one today   -Suspect chronic slow ooze  -Stool for occult blood  -Follow up Iron, TIBC level  -Follow up on the Hn     Thrombocytopenia  -Bruises, transfuse one unit of Platelets.  -Follow up on the platelets level on daily level        Palliative care encounter  Concerns for goals of care  POA      Dementia  - Stable, chronic , continue to observe for delirum   - Delirium precautions.   -Psych consulted for today's agitation as the family member on the bed side was forcing her to wear the mask and upon refusal, the patient got agitated.   -Improved afterwards.   -The niece is concerned about the hemoglobin, and thinks "Vampires" are possibly collecting her blood.           VTE Risk Mitigation (From admission, onward)         Ordered     IP VTE HIGH RISK PATIENT  Once      03/04/20 1811     Place sequential compression device  Until discontinued      03/04/20 1811     Place MERLY hose  Until discontinued      03/04/20 1811                      Orquidea Garcia MD  Department of Hospital Medicine   [facility]  "

## 2020-03-06 NOTE — NURSING
Report given to STACIA Sims. Patient resting comfortably in bed. Denies any complaints or needs at this time. Bed in lowest position, wheels locked and call light within reach. POC reviewed with patient, patient verbalized understanding.

## 2020-03-06 NOTE — ASSESSMENT & PLAN NOTE
-The patient had a drop in hemoglobin since 2/22/20  -Possible DIC  -LDH elevated indicating possible hemolysis  -Haptoglobin pending  -HAROON negative.  -Will monitor  -Transfuse to keep above 7g/dL

## 2020-03-06 NOTE — ASSESSMENT & PLAN NOTE
-Pt with hypoxia with CXR concerning for CHF exacerbation as well as an elevated BNP  -Would consider CTA of the chest if hypoxia does not improve given concern for DIC.  -Will monitor

## 2020-03-06 NOTE — PLAN OF CARE
SW met with patient to complete assessment. Patient refused to speak and turned her head. SW will contact family to complete assessment.      03/05/20 5474   Discharge Assessment   Assessment Type Discharge Planning Assessment

## 2020-03-06 NOTE — HPI
Pt is an 89 yo F with dementia who presents to the hospital with complaints of SOB.  On admission the patient was found to have profound anemia with hemoglobin of 6.9g/dL, thrombocytopenia with platelet count of 21k.  On laboratory work up the patient was also seen to have elevated INR, PTT, liver enzymes and AKP.  Currently the patient is without complaints but is confused at times during ROS.  The patient denies CP, SOB, abdominal pain, N/V, constipation, diarrhea.  The patient denies fever, chills, night sweats, weight loss, new lumps or bumps, easy bruising or bleeding.

## 2020-03-06 NOTE — PROGRESS NOTES
Patient alert to self but confused. She can answer simple yes/no questions but has difficulty with anything more. She denies pain or SOB at present and is wearing continuous O2.   No family in room at present. Her sisters were here earlier per staff but have now left. Discussion with Dr Rani Kendrick Onc who met with patient and her sisters earlier and the sisters have designated the nieces as the decision makers. There are no ADs and due to patient's cognitive impairment ADs cannot be completed at this time.       Addendum  Discussed with Dr Lambert Psychiatry        > 50% of 15 min visit spent in chart review, face to face discussion of goals of care,  symptom assessment, coordination of care and emotional support.

## 2020-03-06 NOTE — PLAN OF CARE
03/06/20 1616   Discharge Assessment   Assessment Type Discharge Planning Assessment   Assessment information obtained from? Medical Record   Prior to hospitilization cognitive status: Unable to Assess   Prior to hospitalization functional status: Independent   Current cognitive status: Unable to Assess   Current Functional Status: Independent   Facility Arrived From: Home   Lives With alone   Able to Return to Prior Arrangements yes   Is patient able to care for self after discharge? Yes   Patient's perception of discharge disposition home or selfcare   Readmission Within the Last 30 Days no previous admission in last 30 days   Patient currently being followed by outpatient case management? No   Patient currently receives any other outside agency services? No   Equipment Currently Used at Home none   Do you have any problems affording any of your prescribed medications? No   Is the patient taking medications as prescribed? yes   Does the patient have transportation home? Yes   Transportation Anticipated family or friend will provide   Does the patient receive services at the [facility]? No   Discharge Plan A Home  (PCP)   Discharge Plan B Home  (PCP)   DME Needed Upon Discharge  other (see comments)  (TBD)   Patient/Family in Agreement with Plan yes   Does the patient have transportation to healthcare appointments? Yes       SW used medical record to complete assessment. Patient unwilling to answer questions. SW called patient's sister and niece and got no answer.   Thai Her MD        Extended Emergency Contact Information  Primary Emergency Contact: Delisa Richmond   [facility]  Home Phone: [phone]  Relation: Sister  Secondary Emergency Contact: Kenn Santo  Home Phone: [phone]  Relation: Other

## 2020-03-06 NOTE — CONSULTS
[facility] [place]  Palliative Medicine  Consult Note    Patient Name: Amena Wick  MRN: [id number]  Admission Date: 3/4/2020  Hospital Length of Stay: 2 days  Code Status: Full Code   Attending Provider: Orquidea Garcia MD  Consulting Provider: Esthela Farrell NP  Primary Care Physician: Thai Her MD  Principal Problem:Symptomatic anemia      Discussed with Dr Garcia.   Palliative Medicine consult for GOC. Family has evidently refused extensive testing and are asking for transfusions only and comfort.     -patient does not have AD and is unable to make decisions so will not be able to complete AD and will need to find out who will be making decisions        Patient information was obtained from past medical records and ER records.        Thank you for your consult. I will follow-up with patient. Please contact us if you have any additional questions.    Subjective:     HPI:   Principal Problem:Symptomatic anemia     Chief Complaint:        Chief Complaint   Patient presents with    Shortness of Breath       Per EMS, family reports pateint appears short of breath. Room air sat was 88%, upon arrival to ED sat of 92% on 2L.          HPI: Amena Nash is a 88 YOCF with PMH of dementia who presented to the ED via EMS accompanied by her niece c/o pallor, fatigue and SOB when the pt woke up. . Accordign to the history obtained in the ER , the pt's niece reported several sick contacts at home. Per EMS, the pt presented with an O2 sat of 88% on RA, which improved to 92% on 2L. The pt denies fever, rhinorrhea, abdominal pain and chest pain. She does not use supplemental O2 at home. She does not smoke cigarettes, consume EtOH or use illicit drugs. Further hx is limited due to dementia. No prior tx.      At the time of my examination the pt reported no symptoms, provided the above account and reported that her niece noticed unusual color/pallor on my face  She denied any SOB, palptitations but  has recently noticed increased weakness.               Past Medical History:   Diagnosis Date    Dementia     Encounter for blood transfusion 03/04/2020    no previous transfusion history    Requires assistance with activities of daily living (ADL)     Symptomatic anemia 03/04/2020       Past Surgical History:   Procedure Laterality Date    NO PAST SURGERIES  03/04/2020       Review of patient's allergies indicates:  No Known Allergies    Medications:  Continuous Infusions:   lactated ringers 50 mL/hr at 03/04/20 1933     Scheduled Meds:   aspirin  81 mg Oral Daily    furosemide  40 mg Oral Daily    pantoprazole  40 mg Oral Daily     PRN Meds:sodium chloride, sodium chloride, sodium chloride, sodium chloride 0.9%    Family History     None        Tobacco Use    Smoking status: Former Smoker   Substance and Sexual Activity    Alcohol use: Not Currently    Drug use: Not Currently    Sexual activity: Not on file       Review of Systems   Unable to perform ROS: Dementia     Objective:     Vital Signs (Most Recent):  Temp: 98.4 °F (36.9 °C) (03/05/20 1421)  Pulse: 99 (03/05/20 1421)  Resp: 18 (03/05/20 1421)  BP: 115/61 (03/05/20 1421)  SpO2: 95 % (03/05/20 1421) Vital Signs (24h Range):  Temp:  [97.6 °F (36.4 °C)-98.6 °F (37 °C)] 98.4 °F (36.9 °C)  Pulse:  [] 99  Resp:  [16-18] 18  SpO2:  [87 %-96 %] 95 %  BP: (103-145)/(54-79) 115/61     Weight: 49.4 kg (108 lb 14.5 oz)  Body mass index is 19.92 kg/m².    Review of Symptoms  Symptom Assessment (ESAS 0-10 scale)   ESAS 0 1 2 3 4 5 6 7 8 9 10   Pain              Dyspnea              Anxiety              Nausea              Depression               Anorexia              Fatigue              Insomnia              Restlessness               Agitation                  Physical Exam   Constitutional: She appears well-developed and well-nourished.   HENT:   Head: Normocephalic and atraumatic.   Neck: Normal range of motion.   Cardiovascular: Normal rate.    Pulmonary/Chest: Breath sounds normal.   Oxygen on, breathing labored    Abdominal: Soft. Bowel sounds are normal.   Musculoskeletal: She exhibits no edema.   Neurological: She is alert.   Limited by dementia   Skin: Skin is warm and dry.   Nursing note and vitals reviewed.      Significant Labs: All pertinent labs within the past 24 hours have been reviewed.  CBC:   Recent Labs   Lab 03/05/20  0145   WBC 7.02   HGB 7.9*   HCT 24.4*   MCV 93   PLT 20*     BMP:  Recent Labs   Lab 03/05/20  0145         K 3.8      CO2 27   BUN 14   CREATININE 0.8   CALCIUM 7.9*     LFT:  Lab Results   Component Value Date    AST 59 (H) 03/05/2020    ALKPHOS 180 (H) 03/05/2020    BILITOT 1.5 (H) 03/05/2020     Albumin:   Albumin   Date Value Ref Range Status   03/05/2020 2.7 (L) 3.5 - 5.2 g/dL Final     Protein:   Total Protein   Date Value Ref Range Status   03/05/2020 5.1 (L) 6.0 - 8.4 g/dL Final     Lactic acid:   Lab Results   Component Value Date    LACTATE 1.7 03/04/2020       Significant Imaging: I have reviewed all pertinent imaging results/findings within the past 24 hours.    Advance Care Planning   Advanced Directives::  Living Will: No  LaPOST: No  Do Not Resuscitate Status: patient is a full code  Medical Power of : No    Decision-Making Capacity: Patient unable to communicate due to disease severity/cognitive impairment       Living Arrangements: Lives with Niece    Patient sitting in chair receiving transfusion PRBC's. She is alert but unable to tell me her name, birthday or where she is. She does not remember who she lives with and when I prompted her about is it here her niece she states she does remember but think she is redheaded. Patient clearly lacks capacity to make decisions and no family in room. Per Dr Garcia and noted per ED that niece is helping in decision making.               > 50% of 50 min visit spent in chart review, face to face discussion of goals of care,  symptom  assessment, coordination of care and emotional support.    Esthela Farrell NP  Palliative Medicine  [facility]

## 2020-03-06 NOTE — ASSESSMENT & PLAN NOTE
-The patient has signs concerning for DIC with elevated INR, PTT, anemia, and low fibrinogen  -Would check factor VIII level to delineate between liver dysfunction and DIC.  -Would transfuse cryoprecipitate to keep fibrinogen above 100  -Check fibrinogen levels, INR and PTT daily  -Screen for infectious etiologies.  -This could potentially explain the patient's anemia and thrombocytopenia

## 2020-03-06 NOTE — ASSESSMENT & PLAN NOTE
-S/P transfusion x 1 Iin the ED, and one today   -Suspect chronic slow ooze  -Stool for occult blood  -Follow up Iron, TIBC level  -Follow up on the HnH

## 2020-03-06 NOTE — ASSESSMENT & PLAN NOTE
-Possibly from DIC  -Folate, B12, Hepatitis panel, HIV pending  -MPV elevated indicating possibility of ITP  -Will monitor

## 2020-03-06 NOTE — CONSULTS
[facility]  Hematology/Oncology  Consult Note    Patient Name: Amena Wick  MRN: [id number]  Admission Date: 3/4/2020  Hospital Length of Stay: 2 days  Code Status: Full Code   Attending Provider: Orquidea Garcia MD  Consulting Provider: Miguel Saravia MD  Primary Care Physician: Thai Her MD  Principal Problem:Symptomatic anemia    Inpatient consult to Hematology  Consult performed by: Miguel Saravia MD  Consult ordered by: Orquidea Garcia MD  Reason for consult: Anemia and thrombocytopenia        Subjective:     HPI:  Pt is an 89 yo F with dementia who presents to the hospital with complaints of SOB.  On admission the patient was found to have profound anemia with hemoglobin of 6.9g/dL, thrombocytopenia with platelet count of 21k.  On laboratory work up the patient was also seen to have elevated INR, PTT, liver enzymes and AKP.  Currently the patient is without complaints but is confused at times during ROS.  The patient denies CP, SOB, abdominal pain, N/V, constipation, diarrhea.  The patient denies fever, chills, night sweats, weight loss, new lumps or bumps, easy bruising or bleeding.      Oncology Treatment Plan:   [No treatment plan]    Medications:  Continuous Infusions:   lactated ringers 50 mL/hr at 03/04/20 1933     Scheduled Meds:   aspirin  81 mg Oral Daily    furosemide  40 mg Oral Daily    pantoprazole  40 mg Oral Daily     PRN Meds:sodium chloride, sodium chloride, sodium chloride, sodium chloride 0.9%     Review of patient's allergies indicates:  No Known Allergies     Past Medical History:   Diagnosis Date    Dementia     Encounter for blood transfusion 03/04/2020    no previous transfusion history    Requires assistance with activities of daily living (ADL)     Symptomatic anemia 03/04/2020     Past Surgical History:   Procedure Laterality Date    NO PAST SURGERIES  03/04/2020     Family History     None        Tobacco Use    Smoking status: Former Smoker    Substance and Sexual Activity    Alcohol use: Not Currently    Drug use: Not Currently    Sexual activity: Not on file       Review of Systems   Constitutional: Negative for chills, fatigue and fever.   HENT: Negative for sore throat and trouble swallowing.    Eyes: Negative for visual disturbance.   Respiratory: Negative for chest tightness and shortness of breath.    Cardiovascular: Negative for chest pain, palpitations and leg swelling.   Gastrointestinal: Negative for abdominal pain, constipation, diarrhea, nausea and vomiting.   Genitourinary: Negative for difficulty urinating and dysuria.   Musculoskeletal: Negative for arthralgias and back pain.   Skin: Negative for color change and rash.   Neurological: Negative for weakness, numbness and headaches.     Objective:     Vital Signs (Most Recent):  Temp: 98.1 °F (36.7 °C) (03/06/20 0800)  Pulse: 89 (03/06/20 0800)  Resp: 16 (03/06/20 0800)  BP: (!) 120/55 (03/06/20 0800)  SpO2: (!) 94 % (03/06/20 0830) Vital Signs (24h Range):  Temp:  [97.5 °F (36.4 °C)-98.7 °F (37.1 °C)] 98.1 °F (36.7 °C)  Pulse:  [] 89  Resp:  [16-19] 16  SpO2:  [81 %-97 %] 94 %  BP: ()/(54-74) 120/55     Weight: 49.1 kg (108 lb 3.9 oz)  Body mass index is 19.8 kg/m².  Body surface area is 1.47 meters squared.      Intake/Output Summary (Last 24 hours) at 3/6/2020 1124  Last data filed at 3/6/2020 0614  Gross per 24 hour   Intake 961.66 ml   Output --   Net 961.66 ml       Physical Exam   Constitutional: She is oriented to person, place, and time. No distress.   Thin, cachectic   HENT:   Head: Normocephalic and atraumatic.   Eyes: Right eye exhibits no discharge. Left eye exhibits no discharge. No scleral icterus.   Cardiovascular: Normal rate, regular rhythm, normal heart sounds and intact distal pulses. Exam reveals no gallop and no friction rub.   No murmur heard.  Pulmonary/Chest: Effort normal and breath sounds normal. No respiratory distress. She has no wheezes. She has  no rales. She exhibits no tenderness.   Abdominal: Soft. Bowel sounds are normal. She exhibits no distension and no mass. There is no tenderness. There is no rebound and no guarding.   Musculoskeletal: Normal range of motion. She exhibits no edema or tenderness.   Neurological: She is alert and oriented to person, place, and time.   Skin: No rash noted. She is not diaphoretic. No erythema.   Psychiatric:   Pt with confusion at times.       Significant Labs:   Recent Results (from the past 24 hour(s))   Iron and TIBC    Collection Time: 03/06/20  6:06 AM   Result Value Ref Range    Iron 30 30 - 160 ug/dL    Transferrin 194 (L) 200 - 375 mg/dL    TIBC 287 250 - 450 ug/dL    Saturated Iron 10 (L) 20 - 50 %   CBC auto differential    Collection Time: 03/06/20  6:06 AM   Result Value Ref Range    WBC 6.10 3.90 - 12.70 K/uL    RBC 2.64 (L) 4.00 - 5.40 M/uL    Hemoglobin 7.7 (L) 12.0 - 16.0 g/dL    Hematocrit 24.8 (L) 37.0 - 48.5 %    Mean Corpuscular Volume 94 82 - 98 fL    Mean Corpuscular Hemoglobin 29.2 27.0 - 31.0 pg    Mean Corpuscular Hemoglobin Conc 31.0 (L) 32.0 - 36.0 g/dL    RDW 19.4 (H) 11.5 - 14.5 %    Platelets 33 (LL) 150 - 350 K/uL    MPV 12.0 9.2 - 12.9 fL    Immature Granulocytes 0.7 (H) 0.0 - 0.5 %    Gran # (ANC) 4.5 1.8 - 7.7 K/uL    Immature Grans (Abs) 0.04 0.00 - 0.04 K/uL    Lymph # 1.0 1.0 - 4.8 K/uL    Mono # 0.6 0.3 - 1.0 K/uL    Eos # 0.0 0.0 - 0.5 K/uL    Baso # 0.01 0.00 - 0.20 K/uL    nRBC 2 (A) 0 /100 WBC    Gran% 73.0 38.0 - 73.0 %    Lymph% 16.4 (L) 18.0 - 48.0 %    Mono% 9.0 4.0 - 15.0 %    Eosinophil% 0.7 0.0 - 8.0 %    Basophil% 0.2 0.0 - 1.9 %    Differential Method Automated    Comprehensive metabolic panel    Collection Time: 03/06/20  6:06 AM   Result Value Ref Range    Sodium 138 136 - 145 mmol/L    Potassium 3.3 (L) 3.5 - 5.1 mmol/L    Chloride 103 95 - 110 mmol/L    CO2 27 23 - 29 mmol/L    Glucose 109 70 - 110 mg/dL    BUN, Bld 15 8 - 23 mg/dL    Creatinine 0.9 0.5 - 1.4 mg/dL     Calcium 7.5 (L) 8.7 - 10.5 mg/dL    Total Protein 5.3 (L) 6.0 - 8.4 g/dL    Albumin 2.7 (L) 3.5 - 5.2 g/dL    Total Bilirubin 2.4 (H) 0.1 - 1.0 mg/dL    Alkaline Phosphatase 216 (H) 55 - 135 U/L    AST 62 (H) 10 - 40 U/L    ALT 56 (H) 10 - 44 U/L    Anion Gap 8 8 - 16 mmol/L    eGFR if African American >60 >60 mL/min/1.73 m^2    eGFR if non African American 57 (A) >60 mL/min/1.73 m^2   Direct antiglobulin test    Collection Time: 03/06/20  9:09 AM   Result Value Ref Range    Direct Sammy (HAROON) NEG    Lactate dehydrogenase    Collection Time: 03/06/20  9:09 AM   Result Value Ref Range     (H) 110 - 260 U/L   Fibrinogen    Collection Time: 03/06/20  9:09 AM   Result Value Ref Range    Fibrinogen 73 (LL) 182 - 366 mg/dL   Pathologist Interpretation Differential    Collection Time: 03/06/20  9:09 AM   Result Value Ref Range    Pathologist Review Review required    CBC auto differential    Collection Time: 03/06/20  9:09 AM   Result Value Ref Range    WBC 6.76 3.90 - 12.70 K/uL    RBC 2.89 (L) 4.00 - 5.40 M/uL    Hemoglobin 8.5 (L) 12.0 - 16.0 g/dL    Hematocrit 27.2 (L) 37.0 - 48.5 %    Mean Corpuscular Volume 94 82 - 98 fL    Mean Corpuscular Hemoglobin 29.4 27.0 - 31.0 pg    Mean Corpuscular Hemoglobin Conc 31.3 (L) 32.0 - 36.0 g/dL    RDW 19.6 (H) 11.5 - 14.5 %    Platelets 36 (LL) 150 - 350 K/uL    MPV 12.3 9.2 - 12.9 fL    Immature Granulocytes 0.7 (H) 0.0 - 0.5 %    Gran # (ANC) 5.0 1.8 - 7.7 K/uL    Immature Grans (Abs) 0.05 (H) 0.00 - 0.04 K/uL    Lymph # 1.0 1.0 - 4.8 K/uL    Mono # 0.6 0.3 - 1.0 K/uL    Eos # 0.1 0.0 - 0.5 K/uL    Baso # 0.02 0.00 - 0.20 K/uL    nRBC 2 (A) 0 /100 WBC    Gran% 73.7 (H) 38.0 - 73.0 %    Lymph% 14.8 (L) 18.0 - 48.0 %    Mono% 9.2 4.0 - 15.0 %    Eosinophil% 1.3 0.0 - 8.0 %    Basophil% 0.3 0.0 - 1.9 %    Aniso Slight     Poly Occasional     Differential Method Automated          Diagnostic Results:    CXR 3/04/20    1. Pulmonary vascular congestion and pulmonary edema,  with mild bilateral pleural fluid.  2. Outward convexity and asymmetric prominence of the right hilar region associated with low right lung volume.  Although this may be due to scoliosis and cardiac decompensation, hilar or infrahilar mass could have a similar appearance.  Thoracic CT should be considered for further evaluation.  This report was flagged in Epic as abnormal.    Echo 2/23/20    · Normal left ventricular systolic function. The estimated ejection fraction is 70%.  · Concentric left ventricular hypertrophy.  · Grade II (moderate) left ventricular diastolic dysfunction consistent with pseudonormalization.  · Normal right ventricular systolic function.  · Severe left atrial enlargement.  · Severe right atrial enlargement.  · Mild-to-moderate mitral regurgitation.  · Moderate tricuspid regurgitation.  · The estimated PA systolic pressure is 47 mmHg.  · Pulmonary hypertension present.    Assessment/Plan:     * Symptomatic anemia  -The patient had a drop in hemoglobin since 2/22/20  -Possible DIC  -LDH elevated indicating possible hemolysis  -Haptoglobin pending  -HAROON negative.  -Will monitor  -Transfuse to keep above 7g/dL    DIC (disseminated intravascular coagulation)  -The patient has signs concerning for DIC with elevated INR, PTT, anemia, and low fibrinogen  -Would check factor VIII level to delineate between liver dysfunction and DIC.  -Would transfuse cryoprecipitate to keep fibrinogen above 100  -Check fibrinogen levels, INR and PTT daily  -Screen for infectious etiologies.  -This could potentially explain the patient's anemia and thrombocytopenia      Thrombocytopenia  -Possibly from DIC  -Folate, B12, Hepatitis panel, HIV pending  -MPV elevated indicating possibility of ITP  -Will monitor    Hypoxia  -Pt with hypoxia with CXR concerning for CHF exacerbation as well as an elevated BNP  -Would consider CTA of the chest if hypoxia does not improve given concern for DIC.  -Will monitor    Elevated  liver enzymes  -Hepatitis panel pending  -Consider additional abdominal imaging     Dementia  -Pt with severe dementia  -Pt with worsening confusion at night  -Will monitor  -Sisters have dedicated the patient's niece as the POA.        Thank you for your consult. Hematology/Oncology service will follow-up with patient. Please contact us if you have any additional questions.       Miguel Saravia MD  Hematology/Oncology  [facility]

## 2020-03-06 NOTE — SUBJECTIVE & OBJECTIVE
Oncology Treatment Plan:   [No treatment plan]    Medications:  Continuous Infusions:   lactated ringers 50 mL/hr at 03/04/20 1933     Scheduled Meds:   aspirin  81 mg Oral Daily    furosemide  40 mg Oral Daily    pantoprazole  40 mg Oral Daily     PRN Meds:sodium chloride, sodium chloride, sodium chloride, sodium chloride 0.9%     Review of patient's allergies indicates:  No Known Allergies     Past Medical History:   Diagnosis Date    Dementia     Encounter for blood transfusion 03/04/2020    no previous transfusion history    Requires assistance with activities of daily living (ADL)     Symptomatic anemia 03/04/2020     Past Surgical History:   Procedure Laterality Date    NO PAST SURGERIES  03/04/2020     Family History     None        Tobacco Use    Smoking status: Former Smoker   Substance and Sexual Activity    Alcohol use: Not Currently    Drug use: Not Currently    Sexual activity: Not on file       Review of Systems   Constitutional: Negative for chills, fatigue and fever.   HENT: Negative for sore throat and trouble swallowing.    Eyes: Negative for visual disturbance.   Respiratory: Negative for chest tightness and shortness of breath.    Cardiovascular: Negative for chest pain, palpitations and leg swelling.   Gastrointestinal: Negative for abdominal pain, constipation, diarrhea, nausea and vomiting.   Genitourinary: Negative for difficulty urinating and dysuria.   Musculoskeletal: Negative for arthralgias and back pain.   Skin: Negative for color change and rash.   Neurological: Negative for weakness, numbness and headaches.     Objective:     Vital Signs (Most Recent):  Temp: 98.1 °F (36.7 °C) (03/06/20 0800)  Pulse: 89 (03/06/20 0800)  Resp: 16 (03/06/20 0800)  BP: (!) 120/55 (03/06/20 0800)  SpO2: (!) 94 % (03/06/20 0830) Vital Signs (24h Range):  Temp:  [97.5 °F (36.4 °C)-98.7 °F (37.1 °C)] 98.1 °F (36.7 °C)  Pulse:  [] 89  Resp:  [16-19] 16  SpO2:  [81 %-97 %] 94 %  BP:  ()/(54-74) 120/55     Weight: 49.1 kg (108 lb 3.9 oz)  Body mass index is 19.8 kg/m².  Body surface area is 1.47 meters squared.      Intake/Output Summary (Last 24 hours) at 3/6/2020 1124  Last data filed at 3/6/2020 0614  Gross per 24 hour   Intake 961.66 ml   Output --   Net 961.66 ml       Physical Exam   Constitutional: She is oriented to person, place, and time. No distress.   Thin, cachectic   HENT:   Head: Normocephalic and atraumatic.   Eyes: Right eye exhibits no discharge. Left eye exhibits no discharge. No scleral icterus.   Cardiovascular: Normal rate, regular rhythm, normal heart sounds and intact distal pulses. Exam reveals no gallop and no friction rub.   No murmur heard.  Pulmonary/Chest: Effort normal and breath sounds normal. No respiratory distress. She has no wheezes. She has no rales. She exhibits no tenderness.   Abdominal: Soft. Bowel sounds are normal. She exhibits no distension and no mass. There is no tenderness. There is no rebound and no guarding.   Musculoskeletal: Normal range of motion. She exhibits no edema or tenderness.   Neurological: She is alert and oriented to person, place, and time.   Skin: No rash noted. She is not diaphoretic. No erythema.   Psychiatric:   Pt with confusion at times.       Significant Labs:   Recent Results (from the past 24 hour(s))   Iron and TIBC    Collection Time: 03/06/20  6:06 AM   Result Value Ref Range    Iron 30 30 - 160 ug/dL    Transferrin 194 (L) 200 - 375 mg/dL    TIBC 287 250 - 450 ug/dL    Saturated Iron 10 (L) 20 - 50 %   CBC auto differential    Collection Time: 03/06/20  6:06 AM   Result Value Ref Range    WBC 6.10 3.90 - 12.70 K/uL    RBC 2.64 (L) 4.00 - 5.40 M/uL    Hemoglobin 7.7 (L) 12.0 - 16.0 g/dL    Hematocrit 24.8 (L) 37.0 - 48.5 %    Mean Corpuscular Volume 94 82 - 98 fL    Mean Corpuscular Hemoglobin 29.2 27.0 - 31.0 pg    Mean Corpuscular Hemoglobin Conc 31.0 (L) 32.0 - 36.0 g/dL    RDW 19.4 (H) 11.5 - 14.5 %    Platelets  33 (LL) 150 - 350 K/uL    MPV 12.0 9.2 - 12.9 fL    Immature Granulocytes 0.7 (H) 0.0 - 0.5 %    Gran # (ANC) 4.5 1.8 - 7.7 K/uL    Immature Grans (Abs) 0.04 0.00 - 0.04 K/uL    Lymph # 1.0 1.0 - 4.8 K/uL    Mono # 0.6 0.3 - 1.0 K/uL    Eos # 0.0 0.0 - 0.5 K/uL    Baso # 0.01 0.00 - 0.20 K/uL    nRBC 2 (A) 0 /100 WBC    Gran% 73.0 38.0 - 73.0 %    Lymph% 16.4 (L) 18.0 - 48.0 %    Mono% 9.0 4.0 - 15.0 %    Eosinophil% 0.7 0.0 - 8.0 %    Basophil% 0.2 0.0 - 1.9 %    Differential Method Automated    Comprehensive metabolic panel    Collection Time: 03/06/20  6:06 AM   Result Value Ref Range    Sodium 138 136 - 145 mmol/L    Potassium 3.3 (L) 3.5 - 5.1 mmol/L    Chloride 103 95 - 110 mmol/L    CO2 27 23 - 29 mmol/L    Glucose 109 70 - 110 mg/dL    BUN, Bld 15 8 - 23 mg/dL    Creatinine 0.9 0.5 - 1.4 mg/dL    Calcium 7.5 (L) 8.7 - 10.5 mg/dL    Total Protein 5.3 (L) 6.0 - 8.4 g/dL    Albumin 2.7 (L) 3.5 - 5.2 g/dL    Total Bilirubin 2.4 (H) 0.1 - 1.0 mg/dL    Alkaline Phosphatase 216 (H) 55 - 135 U/L    AST 62 (H) 10 - 40 U/L    ALT 56 (H) 10 - 44 U/L    Anion Gap 8 8 - 16 mmol/L    eGFR if African American >60 >60 mL/min/1.73 m^2    eGFR if non African American 57 (A) >60 mL/min/1.73 m^2   Direct antiglobulin test    Collection Time: 03/06/20  9:09 AM   Result Value Ref Range    Direct Sammy (HAROON) NEG    Lactate dehydrogenase    Collection Time: 03/06/20  9:09 AM   Result Value Ref Range     (H) 110 - 260 U/L   Fibrinogen    Collection Time: 03/06/20  9:09 AM   Result Value Ref Range    Fibrinogen 73 (LL) 182 - 366 mg/dL   Pathologist Interpretation Differential    Collection Time: 03/06/20  9:09 AM   Result Value Ref Range    Pathologist Review Review required    CBC auto differential    Collection Time: 03/06/20  9:09 AM   Result Value Ref Range    WBC 6.76 3.90 - 12.70 K/uL    RBC 2.89 (L) 4.00 - 5.40 M/uL    Hemoglobin 8.5 (L) 12.0 - 16.0 g/dL    Hematocrit 27.2 (L) 37.0 - 48.5 %    Mean Corpuscular Volume 94  82 - 98 fL    Mean Corpuscular Hemoglobin 29.4 27.0 - 31.0 pg    Mean Corpuscular Hemoglobin Conc 31.3 (L) 32.0 - 36.0 g/dL    RDW 19.6 (H) 11.5 - 14.5 %    Platelets 36 (LL) 150 - 350 K/uL    MPV 12.3 9.2 - 12.9 fL    Immature Granulocytes 0.7 (H) 0.0 - 0.5 %    Gran # (ANC) 5.0 1.8 - 7.7 K/uL    Immature Grans (Abs) 0.05 (H) 0.00 - 0.04 K/uL    Lymph # 1.0 1.0 - 4.8 K/uL    Mono # 0.6 0.3 - 1.0 K/uL    Eos # 0.1 0.0 - 0.5 K/uL    Baso # 0.02 0.00 - 0.20 K/uL    nRBC 2 (A) 0 /100 WBC    Gran% 73.7 (H) 38.0 - 73.0 %    Lymph% 14.8 (L) 18.0 - 48.0 %    Mono% 9.2 4.0 - 15.0 %    Eosinophil% 1.3 0.0 - 8.0 %    Basophil% 0.3 0.0 - 1.9 %    Aniso Slight     Poly Occasional     Differential Method Automated          Diagnostic Results:    CXR 3/04/20    1. Pulmonary vascular congestion and pulmonary edema, with mild bilateral pleural fluid.  2. Outward convexity and asymmetric prominence of the right hilar region associated with low right lung volume.  Although this may be due to scoliosis and cardiac decompensation, hilar or infrahilar mass could have a similar appearance.  Thoracic CT should be considered for further evaluation.  This report was flagged in Epic as abnormal.    Echo 2/23/20    · Normal left ventricular systolic function. The estimated ejection fraction is 70%.  · Concentric left ventricular hypertrophy.  · Grade II (moderate) left ventricular diastolic dysfunction consistent with pseudonormalization.  · Normal right ventricular systolic function.  · Severe left atrial enlargement.  · Severe right atrial enlargement.  · Mild-to-moderate mitral regurgitation.  · Moderate tricuspid regurgitation.  · The estimated PA systolic pressure is 47 mmHg.  · Pulmonary hypertension present.

## 2020-03-06 NOTE — SUBJECTIVE & OBJECTIVE
Patient History           Medical as of 3/6/2020     Past Medical History     Diagnosis Date Comments Source    Dementia -- -- Provider    Encounter for blood transfusion 03/04/2020 no previous transfusion history Provider    Requires assistance with activities of daily living (ADL) -- -- Provider    Symptomatic anemia 03/04/2020 -- Provider                  Surgical as of 3/6/2020     Past Surgical History     Procedure Laterality Date Comments Source    NO PAST SURGERIES -- 03/04/2020 -- Provider                  Family as of 3/6/2020    None           Tobacco Use as of 3/6/2020     Smoking Status Smoking Start Date Smoking Quit Date Packs/Day Years Used    Former Smoker -- -- -- --    Types Comments Smokeless Tobacco Status Smokeless Tobacco Quit Date Source    -- -- Unknown -- Provider            Alcohol Use as of 3/6/2020     Alcohol Use Drinks/Week Alcohol/Week Comments Source    Not Currently -- -- -- Provider    Frequency Standard Drinks Binge Drinking        -- -- --              Drug Use as of 3/6/2020     Drug Use Types Frequency Comments Source    Not Currently -- -- -- Provider            Sexual Activity as of 3/6/2020     Sexually Active Birth Control Partners Comments Source    -- -- -- -- Provider            Activities of Daily Living as of 3/6/2020    None           Social Documentation as of 3/6/2020    None           Occupational as of 3/6/2020    None           Socioeconomic as of 3/6/2020     Marital Status Spouse Name Number of Children Years Education Education Level Preferred Language Ethnicity Race Source     -- -- -- -- English /White White --    Financial Resource Strain Food Insecurity: Worry Food Insecurity: Inability Transportation Needs: Medical Transportation Needs: Non-medical    -- -- -- -- --            Pertinent History     Question Response Comments    Lives with -- --    Place in Birth Order -- --    Lives in -- --    Number of Siblings -- --    Raised by --  "--    Legal Involvement -- --    Childhood Trauma -- --    Criminal History of -- --    Financial Status -- --    Highest Level of Education -- --    Does patient have access to a firearm? -- --     Service -- --    Primary Leisure Activity -- --    Spirituality -- --        Past Medical History:   Diagnosis Date    Dementia     Encounter for blood transfusion 03/04/2020    no previous transfusion history    Requires assistance with activities of daily living (ADL)     Symptomatic anemia 03/04/2020     Past Surgical History:   Procedure Laterality Date    NO PAST SURGERIES  03/04/2020     Family History     None        Tobacco Use    Smoking status: Former Smoker   Substance and Sexual Activity    Alcohol use: Not Currently    Drug use: Not Currently    Sexual activity: Not on file     Review of patient's allergies indicates:  No Known Allergies    No current facility-administered medications on file prior to encounter.      Current Outpatient Medications on File Prior to Encounter   Medication Sig    aspirin (ECOTRIN) 81 MG EC tablet TAKE 1 TABLET BY MOUTH ONCE DAILY    hydroCHLOROthiazide (HYDRODIURIL) 25 MG tablet     multivitamin capsule Take 1 capsule by mouth once daily.     Psychotherapeutics (From admission, onward)    None        Review of Systems   Unable to perform ROS: Dementia     Strengths and Liabilities: Liability: Patient is impulsive., Liability: Patient has poor health., Liability: Patient has poor judgment, Liability: Patient has possible cognitive impairment.    Objective:     Vital Signs (Most Recent):  Temp: 97.8 °F (36.6 °C) (03/06/20 1501)  Pulse: 69 (03/06/20 1501)  Resp: 19 (03/06/20 1501)  BP: (!) 103/56 (03/06/20 1501)  SpO2: (!) 82 % (03/06/20 1501) Vital Signs (24h Range):  Temp:  [97.5 °F (36.4 °C)-98.7 °F (37.1 °C)] 97.8 °F (36.6 °C)  Pulse:  [] 69  Resp:  [16-19] 19  SpO2:  [81 %-95 %] 82 %  BP: ()/(54-74) 103/56     Height: 5' 2" (157.5 cm)  Weight: " 49.1 kg (108 lb 3.9 oz)  Body mass index is 19.8 kg/m².      Intake/Output Summary (Last 24 hours) at 3/6/2020 1530  Last data filed at 3/6/2020 0614  Gross per 24 hour   Intake 961.66 ml   Output --   Net 961.66 ml       Physical Exam   Psychiatric:   EXAMINATION    CONSTITUTIONAL  General Appearance:  Hospital attire, picking at the bed sheets    MUSCULOSKELETAL  Muscle Strength and Tone:  Normal  Abnormal Involuntary Movements:  None noted  Gait and Station:  Not observed    PSYCHIATRIC MENTAL STATUS EXAM   Level of Consciousness:  Awake and alert  Orientation:  Not oriented to name, month, year, date, situation, place  Grooming:  Limited to hospital setting  Psychomotor Behavior:  Restless and picking at the bed sheets  Speech:  Soft but normal tone, not pressured  Language:  No abnormalities  Mood:  No response, not able to obtain  Affect:  Pleasantly confused  Thought Process:  Poverty  Associations:  [place] and simple  Thought Content:  Not able to obtain  Memory:  Poor to recent and remote  Attention:  Easily distracted  Fund of Knowledge:  Simple to conversation  Insight:  Poor into medical and mental health  Judgment:  Limited into cooperation with care          Significant Labs:   Last 24 Hours:   Recent Lab Results       03/06/20  1300   03/06/20  0909   03/06/20  0606        Albumin     2.7     Alkaline Phosphatase     216     ALT     56     Anion Gap     8     Aniso   Slight       Appearance, UA Clear         AST     62     Bacteria, UA Many         Baso #   0.02 0.01     Basophil%   0.3 0.2     Bilirubin (UA) Negative         BILIRUBIN TOTAL     2.4  Comment:  For infants and newborns, interpretation of results should be based  on gestational age, weight and in agreement with clinical  observations.  Premature Infant recommended reference ranges:  Up to 24 hours.............<8.0 mg/dL  Up to 48 hours............<12.0 mg/dL  3-5 days..................<15.0 mg/dL  6-29 days.................<15.0  mg/dL       BUN, Bld     15     Calcium     7.5     Chloride     103     CO2     27     Color, UA Macy         Creatinine     0.9     Differential Method   Automated Automated     Direct Sammy (HAROON)   NEG       eGFR if      >60     eGFR if non      57  Comment:  Calculation used to obtain the estimated glomerular filtration  rate (eGFR) is the CKD-EPI equation.        Eos #   0.1 0.0     Eosinophil%   1.3 0.7     Fibrinogen   73  Comment:  FIBRINOGEN critical result(s) called and verbal readback obtained   from CINDY SUTTON.  by KAMGrecia 03/06/2020 10:21         Glucose     109     Glucose, UA Negative         Gran # (ANC)   5.0 4.5     Gran%   73.7 73.0     Hematocrit   27.2 24.8     Hemoglobin   8.5 7.7     Immature Grans (Abs)   0.05  Comment:  Mild elevation in immature granulocytes is non specific and   can be seen in a variety of conditions including stress response,   acute inflammation, trauma and pregnancy. Correlation with other   laboratory and clinical findings is essential.   0.04  Comment:  Mild elevation in immature granulocytes is non specific and   can be seen in a variety of conditions including stress response,   acute inflammation, trauma and pregnancy. Correlation with other   laboratory and clinical findings is essential.       Immature Granulocytes   0.7 0.7     Iron     30     Ketones, UA Negative         LD   301  Comment:  Results are increased in hemolyzed samples.       Leukocytes, UA Trace         Lymph #   1.0 1.0     Lymph%   14.8 16.4     MCH   29.4 29.2     MCHC   31.3 31.0     MCV   94 94     Microscopic Comment SEE COMMENT  Comment:  Other formed elements not mentioned in the report are not   present in the microscopic examination.            Mono #   0.6 0.6     Mono%   9.2 9.0     MPV   12.3 12.0     NITRITE UA Negative         nRBC   2 2     Occult Blood UA 1+         Pathologist Review   Review required       pH, UA 6.0         Platelets    36  Comment:  PLT critical result(s) called and verbal readback obtained from CINDY SUTTON.  by [facility] 03/06/2020 10:22   33  Comment:  critical result(s) called and verbal readback obtained from JOAO Crawford by [facility] 03/06/2020 07:12       Poly   Occasional       Potassium     3.3     PROTEIN TOTAL     5.3     Protein, UA Negative  Comment:  Recommend a 24 hour urine protein or a urine   protein/creatinine ratio if globulin induced proteinuria is  clinically suspected.           RBC   2.89 2.64     RBC, UA 5         RDW   19.6 19.4     Saturated Iron     10     Sodium     138     Specific [place], UA 1.010         Specimen UA Urine, Clean Catch         TIBC     287     Transferrin     194     UROBILINOGEN UA 4.0-6.0         WBC, UA 15         WBC   6.76 6.10         All pertinent labs within the past 24 hours have been reviewed.    Significant Imaging: I have reviewed all pertinent imaging results/findings within the past 24 hours.

## 2020-03-06 NOTE — NURSING
Family members here and is refusing scheduled ultra sound of abdomen. Also state patient doesn't need  the medications aspirin, lasix, and protonix and want them to de discontinued as well. Insisting that patient needs to eat now and that the dinner tray be served. Dr. Warner notified.

## 2020-03-06 NOTE — ASSESSMENT & PLAN NOTE
-Pt with severe dementia  -Pt with worsening confusion at night  -Will monitor  -Sisters have dedicated the patient's niece as the POA.

## 2020-03-07 PROBLEM — K76.89 LIVER DYSFUNCTION: Status: ACTIVE | Noted: 2020-03-06

## 2020-03-07 LAB
ABO + RH BLD: NORMAL
ALBUMIN SERPL BCP-MCNC: 2.5 G/DL (ref 3.5–5.2)
ALP SERPL-CCNC: 231 U/L (ref 55–135)
ALT SERPL W/O P-5'-P-CCNC: 51 U/L (ref 10–44)
ANION GAP SERPL CALC-SCNC: 8 MMOL/L (ref 8–16)
ANISOCYTOSIS BLD QL SMEAR: SLIGHT
APTT BLDCRRT: 32.1 SEC (ref 21–32)
AST SERPL-CCNC: 60 U/L (ref 10–40)
BASOPHILS # BLD AUTO: 0.02 K/UL (ref 0–0.2)
BASOPHILS NFR BLD: 0.3 % (ref 0–1.9)
BILIRUB SERPL-MCNC: 1.9 MG/DL (ref 0.1–1)
BLD GP AB SCN CELLS X3 SERPL QL: NORMAL
BLD PROD TYP BPU: NORMAL
BLOOD UNIT EXPIRATION DATE: NORMAL
BLOOD UNIT TYPE CODE: 5100
BLOOD UNIT TYPE CODE: 6200
BLOOD UNIT TYPE CODE: 8400
BLOOD UNIT TYPE: NORMAL
BUN SERPL-MCNC: 20 MG/DL (ref 8–23)
CALCIUM SERPL-MCNC: 7.7 MG/DL (ref 8.7–10.5)
CHLORIDE SERPL-SCNC: 101 MMOL/L (ref 95–110)
CO2 SERPL-SCNC: 28 MMOL/L (ref 23–29)
CODING SYSTEM: NORMAL
CREAT SERPL-MCNC: 0.9 MG/DL (ref 0.5–1.4)
DIFFERENTIAL METHOD: ABNORMAL
DISPENSE STATUS: NORMAL
EOSINOPHIL # BLD AUTO: 0.1 K/UL (ref 0–0.5)
EOSINOPHIL NFR BLD: 1.9 % (ref 0–8)
ERYTHROCYTE [DISTWIDTH] IN BLOOD BY AUTOMATED COUNT: 18.9 % (ref 11.5–14.5)
EST. GFR  (AFRICAN AMERICAN): >60 ML/MIN/1.73 M^2
EST. GFR  (NON AFRICAN AMERICAN): 57 ML/MIN/1.73 M^2
FIBRINOGEN PPP-MCNC: <70 MG/DL (ref 182–366)
FOLATE SERPL-MCNC: 7.9 NG/ML (ref 4–24)
GLUCOSE SERPL-MCNC: 121 MG/DL (ref 70–110)
HAPTOGLOB SERPL-MCNC: 114 MG/DL (ref 30–250)
HCT VFR BLD AUTO: 24.4 % (ref 37–48.5)
HGB BLD-MCNC: 7.7 G/DL (ref 12–16)
IMM GRANULOCYTES # BLD AUTO: 0.05 K/UL (ref 0–0.04)
IMM GRANULOCYTES NFR BLD AUTO: 0.7 % (ref 0–0.5)
INR PPP: 1.3 (ref 0.8–1.2)
LYMPHOCYTES # BLD AUTO: 1 K/UL (ref 1–4.8)
LYMPHOCYTES NFR BLD: 15.2 % (ref 18–48)
MCH RBC QN AUTO: 29.6 PG (ref 27–31)
MCHC RBC AUTO-ENTMCNC: 31.6 G/DL (ref 32–36)
MCV RBC AUTO: 94 FL (ref 82–98)
MONOCYTES # BLD AUTO: 0.6 K/UL (ref 0.3–1)
MONOCYTES NFR BLD: 9.4 % (ref 4–15)
NEUTROPHILS # BLD AUTO: 4.9 K/UL (ref 1.8–7.7)
NEUTROPHILS NFR BLD: 72.5 % (ref 38–73)
NRBC BLD-RTO: 1 /100 WBC
PLATELET # BLD AUTO: 28 K/UL (ref 150–350)
PLATELET BLD QL SMEAR: ABNORMAL
PMV BLD AUTO: 12 FL (ref 9.2–12.9)
POLYCHROMASIA BLD QL SMEAR: ABNORMAL
POTASSIUM SERPL-SCNC: 3.2 MMOL/L (ref 3.5–5.1)
PROT SERPL-MCNC: 5.3 G/DL (ref 6–8.4)
PROTHROMBIN TIME: 14.1 SEC (ref 9–12.5)
RBC # BLD AUTO: 2.6 M/UL (ref 4–5.4)
SODIUM SERPL-SCNC: 137 MMOL/L (ref 136–145)
T4 FREE SERPL-MCNC: 1.08 NG/DL (ref 0.71–1.51)
TRANS ERYTHROCYTES VOL PATIENT: NORMAL ML
TRANS PLATPHERESIS VOL PATIENT: NORMAL ML
TSH SERPL DL<=0.005 MIU/L-ACNC: 0.18 UIU/ML (ref 0.4–4)
UNIT NUMBER: NORMAL
VIT B12 SERPL-MCNC: 838 PG/ML (ref 210–950)
WBC # BLD AUTO: 6.78 K/UL (ref 3.9–12.7)

## 2020-03-07 PROCEDURE — 80053 COMPREHEN METABOLIC PANEL: CPT

## 2020-03-07 PROCEDURE — 21400001 HC TELEMETRY ROOM

## 2020-03-07 PROCEDURE — P9012 CRYOPRECIPITATE EACH UNIT: HCPCS

## 2020-03-07 PROCEDURE — 85610 PROTHROMBIN TIME: CPT

## 2020-03-07 PROCEDURE — 84443 ASSAY THYROID STIM HORMONE: CPT

## 2020-03-07 PROCEDURE — 99233 PR SUBSEQUENT HOSPITAL CARE,LEVL III: ICD-10-PCS | Mod: ,,, | Performed by: INTERNAL MEDICINE

## 2020-03-07 PROCEDURE — P9021 RED BLOOD CELLS UNIT: HCPCS

## 2020-03-07 PROCEDURE — 85730 THROMBOPLASTIN TIME PARTIAL: CPT

## 2020-03-07 PROCEDURE — 99233 SBSQ HOSP IP/OBS HIGH 50: CPT | Mod: ,,, | Performed by: INTERNAL MEDICINE

## 2020-03-07 PROCEDURE — P9035 PLATELET PHERES LEUKOREDUCED: HCPCS

## 2020-03-07 PROCEDURE — 85384 FIBRINOGEN ACTIVITY: CPT

## 2020-03-07 PROCEDURE — 36430 TRANSFUSION BLD/BLD COMPNT: CPT

## 2020-03-07 PROCEDURE — 63600175 PHARM REV CODE 636 W HCPCS: Performed by: INTERNAL MEDICINE

## 2020-03-07 PROCEDURE — 84439 ASSAY OF FREE THYROXINE: CPT

## 2020-03-07 PROCEDURE — 86965 POOLING BLOOD PLATELETS: CPT

## 2020-03-07 PROCEDURE — 85025 COMPLETE CBC W/AUTO DIFF WBC: CPT

## 2020-03-07 PROCEDURE — 63600175 PHARM REV CODE 636 W HCPCS: Performed by: EMERGENCY MEDICINE

## 2020-03-07 PROCEDURE — 36415 COLL VENOUS BLD VENIPUNCTURE: CPT

## 2020-03-07 PROCEDURE — 25000003 PHARM REV CODE 250: Performed by: INTERNAL MEDICINE

## 2020-03-07 RX ORDER — POTASSIUM CHLORIDE 20 MEQ/1
40 TABLET, EXTENDED RELEASE ORAL ONCE
Status: DISCONTINUED | OUTPATIENT
Start: 2020-03-07 | End: 2020-03-11 | Stop reason: HOSPADM

## 2020-03-07 RX ORDER — HYDROCODONE BITARTRATE AND ACETAMINOPHEN 500; 5 MG/1; MG/1
TABLET ORAL
Status: DISCONTINUED | OUTPATIENT
Start: 2020-03-07 | End: 2020-03-11 | Stop reason: HOSPADM

## 2020-03-07 RX ORDER — SODIUM CHLORIDE 9 MG/ML
INJECTION, SOLUTION INTRAVENOUS CONTINUOUS
Status: DISCONTINUED | OUTPATIENT
Start: 2020-03-07 | End: 2020-03-08

## 2020-03-07 RX ADMIN — SODIUM CHLORIDE, SODIUM LACTATE, POTASSIUM CHLORIDE, AND CALCIUM CHLORIDE: .6; .31; .03; .02 INJECTION, SOLUTION INTRAVENOUS at 06:03

## 2020-03-07 RX ADMIN — CEFTRIAXONE 1 G: 1 INJECTION, SOLUTION INTRAVENOUS at 11:03

## 2020-03-07 RX ADMIN — SODIUM CHLORIDE: 0.9 INJECTION, SOLUTION INTRAVENOUS at 05:03

## 2020-03-07 RX ADMIN — CEFTRIAXONE 1 G: 1 INJECTION, SOLUTION INTRAVENOUS at 09:03

## 2020-03-07 NOTE — NURSING
Received bedside handoff from Samantha. Discussed plan of care, pain management and safety measures with patient, who is confused as to place date and situation, and her 2 nieces , who had numerous questions regarding her care. THe nieces requested patient not have any blood drawn except after receiving additional PRBC and platelets. They wanted to know why the Dr hasn't ordered any additional blood at this time. They were told the patients current H&H, which is stable, but they stated they felt the patients confusion is because her blood count is still too low. MD aware of blood count and patient not symptomatic at this time. Reassured patients nieces that I would monitor the  Patient closely for any changes and would notify them and her physician. Patient resting comfortably. The family brought her some food from [employer] ,which she ate. Then the patient started eating the napkin ,which was taken away from the patient. One of the patients nieces is going to spend the night with the patient and will discuss patients care with the patients

## 2020-03-07 NOTE — NURSING
Note that Charge RN and Hematologist at bedside meeting with family;   Niece allowing lab draw at this time;    Allowed niece to vent feeling related to patient's care;   Availed staff for patient/family needs;    Awaiting orders from MD's;

## 2020-03-07 NOTE — ASSESSMENT & PLAN NOTE
-The patient is unlikely to have DIC given normal haptoglobin  -The patient's elevated INR/PTT, low fibrinogen and thrombocytopenia are likely from liver dysfunction  -Niece is refusing to have an US of the abdomen or CT scan of the abdomen performed.  -Hepatitis panel pending  -Would consider checking vitamin K levels and/or treating with empiric vitamin K given concern for nutritional deficiencies from decreased PO intake due to advanced dementia.   -Factor VIII level pending  -Consider hepatology consult

## 2020-03-07 NOTE — ASSESSMENT & PLAN NOTE
-UA positive for leucocytosis and many bacteria  -Follow the blood cultures  -Started on ceftriaxone today   -However UTI as the cause is unlikely  -Elevated INR, Low Fibrinogen level ; suspect for manifestation of generalized malnutrition.

## 2020-03-07 NOTE — PROGRESS NOTES
After reevaluating the pt this afternoon, she is more lithargic, weak and needs assistance wit mobility.   The pt is not consuming enough of her food, but reported good appetite.   HnH and platelets reviewed.     Plan   Symptomatic anemia, suspected slow ooze, in the setting of thrombocytopenia.   Will transfuse one unit of blood/PRBC and one unit of platelets.

## 2020-03-07 NOTE — SUBJECTIVE & OBJECTIVE
Interval History: Pt reported feeling better. However, due to baseline dementia she is not aware as to what is going on with her. Improved hemoglobin and platelets.       Review of Systems   Constitutional: Positive for activity change.   HENT: Negative.    Respiratory: Negative.    Cardiovascular: Negative.    Gastrointestinal: Negative.    Psychiatric/Behavioral: Positive for behavioral problems and confusion.     Objective:     Vital Signs (Most Recent):  Temp: 97.8 °F (36.6 °C) (03/06/20 1501)  Pulse: 69 (03/06/20 1501)  Resp: 19 (03/06/20 1501)  BP: (!) 103/56 (03/06/20 1501)  SpO2: (!) 82 % (03/06/20 1501) Vital Signs (24h Range):  Temp:  [97.5 °F (36.4 °C)-98.7 °F (37.1 °C)] 97.8 °F (36.6 °C)  Pulse:  [69-97] 69  Resp:  [16-19] 19  SpO2:  [81 %-95 %] 82 %  BP: ()/(54-60) 103/56     Weight: 49.1 kg (108 lb 3.9 oz)  Body mass index is 19.8 kg/m².    Intake/Output Summary (Last 24 hours) at 3/6/2020 1949  Last data filed at 3/6/2020 0614  Gross per 24 hour   Intake 560.83 ml   Output --   Net 560.83 ml      Physical Exam   Constitutional: She appears well-developed and well-nourished.   HENT:   Head: Normocephalic and atraumatic.   Left Ear: External ear normal.   Nose: Nose normal.   Eyes: Pupils are equal, round, and reactive to light. Conjunctivae and EOM are normal.   Neck: Normal range of motion. Neck supple.   Cardiovascular: Normal rate, regular rhythm and intact distal pulses.   Pulmonary/Chest: Effort normal and breath sounds normal.   Abdominal: Soft. Bowel sounds are normal.   Musculoskeletal: Normal range of motion.   Neurological: She is alert. She displays normal reflexes. No cranial nerve deficit or sensory deficit.   Disoriented    Skin: Skin is warm and dry. Capillary refill takes less than 2 seconds. No rash noted. No erythema.   Psychiatric: She has a normal mood and affect.   Nursing note and vitals reviewed.      Significant Labs:   BMP:   Recent Labs   Lab 03/06/20  0606          K 3.3*      CO2 27   BUN 15   CREATININE 0.9   CALCIUM 7.5*     CBC:   Recent Labs   Lab 03/05/20  0145 03/06/20  0606 03/06/20  0909   WBC 7.02 6.10 6.76   HGB 7.9* 7.7* 8.5*   HCT 24.4* 24.8* 27.2*   PLT 20* 33* 36*       Significant Imaging: I have reviewed all pertinent imaging results/findings within the past 24 hours.

## 2020-03-07 NOTE — ASSESSMENT & PLAN NOTE
-S/P Transfusion of platelets in the setting of bruising and suspected mucosal bleeding  -Platelets still low, with drop in HnH despite two units of RBC transfusion, suspect a slow ooze,   -Follow up on the platelets level on daily level and transfuse as needed   -Suspect DIC: Started treatment with Ceftriaxone 1 g BD

## 2020-03-07 NOTE — ASSESSMENT & PLAN NOTE
-Bruises, transfuse one unit of Platelets.  -Follow up on the platelets level on daily level  -Suspect DIC: Started treatment with Ceftriaxone 1 g BD

## 2020-03-07 NOTE — ASSESSMENT & PLAN NOTE
-Pt with end stage dementia with decreased PO intake over the past several weeks per niece  -Sisters have dedicated the patient's niece as the POA.  -Agree with palliative care consult  -If decreased PO intake continues, would recommend hospice  -Would not consider a feeding tube.

## 2020-03-07 NOTE — NURSING
Note that I contacted Anamaria in the blood bank to determine if blood ready;  Was informed that that A-B positive blood has to come from [facility];     Awaiting arrival of blood to transfuse patient;   Informed family;

## 2020-03-07 NOTE — ASSESSMENT & PLAN NOTE
-Will check the viral panel.   -Also will do liver US; niece refuse further testing   -Elevated DIC markers could be in the setting of liver injury,or general nutritional def.

## 2020-03-07 NOTE — ASSESSMENT & PLAN NOTE
"- Stable, chronic , continue to observe for delirum   - Continue Delirium precautions.   -Follow psych eval, appreciate psych eval. Evidence of UTI exists, will start on Ceftriaxone, and will follow the clinical status.   -The niece is concerned about the hemoglobin, and thinks "Vampires" are possibly collecting her blood.     "

## 2020-03-07 NOTE — NURSING
Rested well .slept between care Patients niece remained at bedside- states they are refusing all labs until they speak with the Dr this am and are refusing Lasix and all other po medicines at this time. Patient in no distress .Denies pain or discomfort, pleasantly confused

## 2020-03-07 NOTE — ASSESSMENT & PLAN NOTE
-Folate, B12 levels normal.  Consider checking MMA and homocysteine levels  -Hepatitis panel, HIV pending  -MPV elevated indicating possibility of ITP  -Would transfuse platelets for platelet count less than 10k, </=20k with petechiae or mucosal bleeding or </=50k with active bleeding or immediately prior to invasive procedures.

## 2020-03-07 NOTE — PROGRESS NOTES
"[facility]  Progress Note    Patient Name: Amena Wick  MRN: [id number]  Patient Class: IP- Inpatient   Admission Date: 3/4/2020  Length of Stay: 2 days  Attending Physician: Orquidea Garcia MD  Primary Care Provider: Thai Her MD        Subjective:     Principal Problem:Symptomatic anemia        HPI:  Amena Nash is a 88 YOCF with PMH of dementia who presented to the ED via EMS accompanied by her niece c/o pallor, fatigue and SOB when the pt woke up. . Accordign to the history obtained in the ER , the pt's niece reported several sick contacts at home. Per EMS, the pt presented with an O2 sat of 88% on RA, which improved to 92% on 2L. The pt denies fever, rhinorrhea, abdominal pain and chest pain. She does not use supplemental O2 at home. She does not smoke cigarettes, consume EtOH or use illicit drugs. Further hx is limited due to dementia. No prior tx.      At the time of my examination the pt reported no symptoms, provided the above account and reported that her niece noticed unusual color/pallor on my face  She denied any SOB, palptitations but has recently noticed increased weakness.        Review of patient's allergies indicates:  No Known Allergies    Overview/Hospital Course:  S/P One U of PRBC with subsequent improvement in HnH to 8 from 6.9.     3/5/2020  Improved hemoglobin  Continue to have thrombocytopenia  Niece is on the bed side and although is not POA, refused to be treated for suspected blood dyscrasiaS.  The niece also suspects "Vampires" after her.   Continue to have shortness of breath on minimal exertion    3/5/2020  Baseline dementia  Concerns for DIC, liver disease   Ordered ID work up and liver US     Interval History: Pt reported feeling better. However, due to baseline dementia she is not aware as to what is going on with her. Improved hemoglobin and platelets.       Review of Systems   Constitutional: Positive for activity change. "   HENT: Negative.    Respiratory: Negative.    Cardiovascular: Negative.    Gastrointestinal: Negative.    Psychiatric/Behavioral: Positive for behavioral problems and confusion.     Objective:     Vital Signs (Most Recent):  Temp: 97.8 °F (36.6 °C) (03/06/20 1501)  Pulse: 69 (03/06/20 1501)  Resp: 19 (03/06/20 1501)  BP: (!) 103/56 (03/06/20 1501)  SpO2: (!) 82 % (03/06/20 1501) Vital Signs (24h Range):  Temp:  [97.5 °F (36.4 °C)-98.7 °F (37.1 °C)] 97.8 °F (36.6 °C)  Pulse:  [69-97] 69  Resp:  [16-19] 19  SpO2:  [81 %-95 %] 82 %  BP: ()/(54-60) 103/56     Weight: 49.1 kg (108 lb 3.9 oz)  Body mass index is 19.8 kg/m².    Intake/Output Summary (Last 24 hours) at 3/6/2020 1949  Last data filed at 3/6/2020 0614  Gross per 24 hour   Intake 560.83 ml   Output --   Net 560.83 ml      Physical Exam   Constitutional: She appears well-developed and well-nourished.   HENT:   Head: Normocephalic and atraumatic.   Left Ear: External ear normal.   Nose: Nose normal.   Eyes: Pupils are equal, round, and reactive to light. Conjunctivae and EOM are normal.   Neck: Normal range of motion. Neck supple.   Cardiovascular: Normal rate, regular rhythm and intact distal pulses.   Pulmonary/Chest: Effort normal and breath sounds normal.   Abdominal: Soft. Bowel sounds are normal.   Musculoskeletal: Normal range of motion.   Neurological: She is alert. She displays normal reflexes. No cranial nerve deficit or sensory deficit.   Disoriented    Skin: Skin is warm and dry. Capillary refill takes less than 2 seconds. No rash noted. No erythema.   Psychiatric: She has a normal mood and affect.   Nursing note and vitals reviewed.      Significant Labs:   BMP:   Recent Labs   Lab 03/06/20  0606         K 3.3*      CO2 27   BUN 15   CREATININE 0.9   CALCIUM 7.5*     CBC:   Recent Labs   Lab 03/05/20  0145 03/06/20  0606 03/06/20  0909   WBC 7.02 6.10 6.76   HGB 7.9* 7.7* 8.5*   HCT 24.4* 24.8* 27.2*   PLT 20* 33* 36*  "      Significant Imaging: I have reviewed all pertinent imaging results/findings within the past 24 hours.      Assessment/Plan:      * Symptomatic anemia    -S/P transfusion x 1 Iin the ED, and one ON 3/5/2020- Improved HnH   -Suspect chronic slow ooze VS DIC   -Stool for occult blood  -Follow up on the HnH     DIC (disseminated intravascular coagulation)  -UA positive for leucocytosis and many bacteria  -Follow the blood cultures  -Started on ceftriaxone today       Elevated liver enzymes  -Will check the viral panel.   -Also will do liver US  -Elevated DIC markers could be in the setting of liver injury        Thrombocytopenia  -Bruises, transfuse one unit of Platelets.  -Follow up on the platelets level on daily level  -Suspect DIC: Started treatment with Ceftriaxone 1 g BD        Palliative care encounter  Concerns for goals of care  POA      Dementia with behavioral disturbance  - Stable, chronic , continue to observe for delirum   - Continue Delirium precautions.   -Follow psych eval, appreciate psych eval. Evidence of UTI exists, will start on Ceftriaxone, and will follow the clinical status.   -The niece is concerned about the hemoglobin, and thinks "Vampires" are possibly collecting her blood.         VTE Risk Mitigation (From admission, onward)         Ordered     IP VTE HIGH RISK PATIENT  Once      03/04/20 1811     Place sequential compression device  Until discontinued      03/04/20 1811     Place MERLY hose  Until discontinued      03/04/20 1811                      Orquidea Garcia MD  Department of Hospital Medicine   [facility] [place]  "

## 2020-03-07 NOTE — NURSING
"Note that patient awake and alert with stephenece sitting at bedside;   Niece refusing all oral meds stating, "she doesn't need that."  Rocephin given/infusing;  Niprincess also refusing lab draws stating,"she is loosing blood everytime you take blood."    Niece insisting on talking to Dr. Tayler GOVEA at bedside talking to casi;     Awaiting orders from Dr. Garcia;   "

## 2020-03-07 NOTE — PROGRESS NOTES
[facility]  Hematology/Oncology  Progress Note    Patient Name: Amena Wick  Admission Date: 3/4/2020  Hospital Length of Stay: 3 days  Code Status: Full Code     Subjective:     HPI:  Pt is an 87 yo F with dementia who presents to the hospital with complaints of SOB.  On admission the patient was found to have profound anemia with hemoglobin of 6.9g/dL, thrombocytopenia with platelet count of 21k.  On laboratory work up the patient was also seen to have elevated INR, PTT, liver enzymes and AKP.  Currently the patient is without complaints but is confused at times during ROS.  The patient denies CP, SOB, abdominal pain, N/V, constipation, diarrhea.  The patient denies fever, chills, night sweats, weight loss, new lumps or bumps, easy bruising or bleeding.      Interval History: Pt seen by psychiatry who believes the patient to have end stage dementia.  Per niece the patient has not been eating for the past couple of weeks. Pt with no acute events overnight.  Pt denies fever, chills, CP, SOB, cough, N/V, constipation, diarrhea    Oncology Treatment Plan:   [No treatment plan]    Medications:  Continuous Infusions:   lactated ringers 50 mL/hr at 03/07/20 0604     Scheduled Meds:   cefTRIAXone (ROCEPHIN) IVPB  1 g Intravenous Q12H    pantoprazole  40 mg Oral Daily     PRN Meds:sodium chloride, sodium chloride, sodium chloride, sodium chloride, sodium chloride 0.9%     Review of Systems   Constitutional: Negative for chills and fever.   Respiratory: Negative for cough and shortness of breath.    Cardiovascular: Negative for chest pain and palpitations.   Gastrointestinal: Negative for abdominal pain, constipation, diarrhea, nausea and vomiting.   Skin: Negative for rash.   Neurological: Negative for headaches.     Objective:     Vital Signs (Most Recent):  Temp: 97.8 °F (36.6 °C) (03/07/20 1346)  Pulse: 82 (03/07/20 1346)  Resp: 16 (03/07/20 1346)  BP: (!) 110/53 (03/07/20 1346)  SpO2: 96 %  (03/07/20 1346) Vital Signs (24h Range):  Temp:  [97.5 °F (36.4 °C)-98.2 °F (36.8 °C)] 97.8 °F (36.6 °C)  Pulse:  [69-88] 82  Resp:  [16-20] 16  SpO2:  [82 %-97 %] 96 %  BP: (101-129)/(52-60) 110/53     Weight: 50.3 kg (110 lb 14.3 oz)  Body mass index is 20.28 kg/m².  Body surface area is 1.48 meters squared.      Intake/Output Summary (Last 24 hours) at 3/7/2020 1351  Last data filed at 3/7/2020 1331  Gross per 24 hour   Intake 65 ml   Output 700 ml   Net -635 ml       Physical Exam   Constitutional: She is oriented to person, place, and time.   Thin, cachectic   Eyes: EOM are normal. Right eye exhibits no discharge. Left eye exhibits no discharge.   Cardiovascular: Normal rate, regular rhythm and normal heart sounds. Exam reveals no gallop and no friction rub.   No murmur heard.  Pulmonary/Chest: Effort normal and breath sounds normal. No respiratory distress. She has no wheezes. She has no rales.   Abdominal: Soft. Bowel sounds are normal. She exhibits no distension. There is no tenderness. There is no rebound and no guarding.   Neurological: She is alert and oriented to person, place, and time.       Significant Labs:   Recent Results (from the past 24 hour(s))   CBC auto differential    Collection Time: 03/07/20 11:36 AM   Result Value Ref Range    WBC 6.78 3.90 - 12.70 K/uL    RBC 2.60 (L) 4.00 - 5.40 M/uL    Hemoglobin 7.7 (L) 12.0 - 16.0 g/dL    Hematocrit 24.4 (L) 37.0 - 48.5 %    Mean Corpuscular Volume 94 82 - 98 fL    Mean Corpuscular Hemoglobin 29.6 27.0 - 31.0 pg    Mean Corpuscular Hemoglobin Conc 31.6 (L) 32.0 - 36.0 g/dL    RDW 18.9 (H) 11.5 - 14.5 %    Platelets 28 (LL) 150 - 350 K/uL    MPV 12.0 9.2 - 12.9 fL    Immature Granulocytes 0.7 (H) 0.0 - 0.5 %    Gran # (ANC) 4.9 1.8 - 7.7 K/uL    Immature Grans (Abs) 0.05 (H) 0.00 - 0.04 K/uL    Lymph # 1.0 1.0 - 4.8 K/uL    Mono # 0.6 0.3 - 1.0 K/uL    Eos # 0.1 0.0 - 0.5 K/uL    Baso # 0.02 0.00 - 0.20 K/uL    nRBC 1 (A) 0 /100 WBC    Gran% 72.5 38.0  - 73.0 %    Lymph% 15.2 (L) 18.0 - 48.0 %    Mono% 9.4 4.0 - 15.0 %    Eosinophil% 1.9 0.0 - 8.0 %    Basophil% 0.3 0.0 - 1.9 %    Platelet Estimate Decreased (A)     Aniso Slight     Poly Occasional     Differential Method Automated    Comprehensive metabolic panel    Collection Time: 03/07/20 11:36 AM   Result Value Ref Range    Sodium 137 136 - 145 mmol/L    Potassium 3.2 (L) 3.5 - 5.1 mmol/L    Chloride 101 95 - 110 mmol/L    CO2 28 23 - 29 mmol/L    Glucose 121 (H) 70 - 110 mg/dL    BUN, Bld 20 8 - 23 mg/dL    Creatinine 0.9 0.5 - 1.4 mg/dL    Calcium 7.7 (L) 8.7 - 10.5 mg/dL    Total Protein 5.3 (L) 6.0 - 8.4 g/dL    Albumin 2.5 (L) 3.5 - 5.2 g/dL    Total Bilirubin 1.9 (H) 0.1 - 1.0 mg/dL    Alkaline Phosphatase 231 (H) 55 - 135 U/L    AST 60 (H) 10 - 40 U/L    ALT 51 (H) 10 - 44 U/L    Anion Gap 8 8 - 16 mmol/L    eGFR if African American >60 >60 mL/min/1.73 m^2    eGFR if non African American 57 (A) >60 mL/min/1.73 m^2   Protime-INR    Collection Time: 03/07/20 11:36 AM   Result Value Ref Range    Prothrombin Time 14.1 (H) 9.0 - 12.5 sec    INR 1.3 (H) 0.8 - 1.2   APTT    Collection Time: 03/07/20 11:36 AM   Result Value Ref Range    aPTT 32.1 (H) 21.0 - 32.0 sec   Fibrinogen    Collection Time: 03/07/20 11:36 AM   Result Value Ref Range    Fibrinogen <70 (AA) 182 - 366 mg/dL   TSH    Collection Time: 03/07/20 11:36 AM   Result Value Ref Range    TSH 0.176 (L) 0.400 - 4.000 uIU/mL   T4, free    Collection Time: 03/07/20 11:36 AM   Result Value Ref Range    Free T4 1.08 0.71 - 1.51 ng/dL         Diagnostic Results:  I have reviewed all pertinent imaging results/findings within the past 24 hours.    Assessment/Plan:     * Liver dysfunction  -The patient is unlikely to have DIC given normal haptoglobin  -The patient's elevated INR/PTT, low fibrinogen and thrombocytopenia are likely from liver dysfunction  -Niece is refusing to have an US of the abdomen or CT scan of the abdomen performed.  -Hepatitis panel  pending  -Would consider checking vitamin K levels and/or treating with empiric vitamin K given concern for nutritional deficiencies from decreased PO intake due to advanced dementia.   -Factor VIII level pending  -Consider hepatology consult          Symptomatic anemia  -The patient had a drop in hemoglobin since 2/22/20  -Concern is that the patient may have had anemia previously which was no evident due to hemo contraction and dehydration  -HAROON negative and Haptoglobin normal making AIHA unlikley  -Transfuse to keep above 7g/dL  -Consider checking for GI blood loss  -Bm biopsy discussed with the niece who does not want to proceed with one  -Given the patient's end stage dementia, it is unlikely to find any BM disorder for which treatment would be recommended    Thrombocytopenia  -Folate, B12 levels normal.  Consider checking MMA and homocysteine levels  -Hepatitis panel, HIV pending  -MPV elevated indicating possibility of ITP  -Would transfuse platelets for platelet count less than 10k, </=20k with petechiae or mucosal bleeding or </=50k with active bleeding or immediately prior to invasive procedures.      Elevated liver enzymes  -Hepatitis panel pending  -Consider additional abdominal imaging     Dementia with behavioral disturbance  -Pt with end stage dementia with decreased PO intake over the past several weeks per niece  -Sisters have dedicated the patient's niece as the POA.  -Agree with palliative care consult  -If decreased PO intake continues, would recommend hospice  -Would not consider a feeding tube.        Hematology/Oncology service will follow-up with patient. Please contact us if you have any additional questions.       Miguel Saravia MD  Hematology/Oncology  [facility] [place]

## 2020-03-07 NOTE — ASSESSMENT & PLAN NOTE
-The patient had a drop in hemoglobin since 2/22/20  -Concern is that the patient may have had anemia previously which was no evident due to hemo contraction and dehydration  -HAROON negative and Haptoglobin normal making BRYAN noe  -Transfuse to keep above 7g/dL  -Consider checking for GI blood loss  -Bm biopsy discussed with the niece who does not want to proceed with one  -Given the patient's end stage dementia, it is unlikely to find any BM disorder for which treatment would be recommended

## 2020-03-07 NOTE — SUBJECTIVE & OBJECTIVE
Interval History: Pt seen by psychiatry who believes the patient to have end stage dementia.  Per niece the patient has not been eating for the past couple of weeks. Pt with no acute events overnight.  Pt denies fever, chills, CP, SOB, cough, N/V, constipation, diarrhea    Oncology Treatment Plan:   [No treatment plan]    Medications:  Continuous Infusions:   lactated ringers 50 mL/hr at 03/07/20 0604     Scheduled Meds:   cefTRIAXone (ROCEPHIN) IVPB  1 g Intravenous Q12H    pantoprazole  40 mg Oral Daily     PRN Meds:sodium chloride, sodium chloride, sodium chloride, sodium chloride, sodium chloride 0.9%     Review of Systems   Constitutional: Negative for chills and fever.   Respiratory: Negative for cough and shortness of breath.    Cardiovascular: Negative for chest pain and palpitations.   Gastrointestinal: Negative for abdominal pain, constipation, diarrhea, nausea and vomiting.   Skin: Negative for rash.   Neurological: Negative for headaches.     Objective:     Vital Signs (Most Recent):  Temp: 97.8 °F (36.6 °C) (03/07/20 1346)  Pulse: 82 (03/07/20 1346)  Resp: 16 (03/07/20 1346)  BP: (!) 110/53 (03/07/20 1346)  SpO2: 96 % (03/07/20 1346) Vital Signs (24h Range):  Temp:  [97.5 °F (36.4 °C)-98.2 °F (36.8 °C)] 97.8 °F (36.6 °C)  Pulse:  [69-88] 82  Resp:  [16-20] 16  SpO2:  [82 %-97 %] 96 %  BP: (101-129)/(52-60) 110/53     Weight: 50.3 kg (110 lb 14.3 oz)  Body mass index is 20.28 kg/m².  Body surface area is 1.48 meters squared.      Intake/Output Summary (Last 24 hours) at 3/7/2020 1351  Last data filed at 3/7/2020 1331  Gross per 24 hour   Intake 65 ml   Output 700 ml   Net -635 ml       Physical Exam   Constitutional: She is oriented to person, place, and time.   Thin, cachectic   Eyes: EOM are normal. Right eye exhibits no discharge. Left eye exhibits no discharge.   Cardiovascular: Normal rate, regular rhythm and normal heart sounds. Exam reveals no gallop and no friction rub.   No murmur  heard.  Pulmonary/Chest: Effort normal and breath sounds normal. No respiratory distress. She has no wheezes. She has no rales.   Abdominal: Soft. Bowel sounds are normal. She exhibits no distension. There is no tenderness. There is no rebound and no guarding.   Neurological: She is alert and oriented to person, place, and time.       Significant Labs:   Recent Results (from the past 24 hour(s))   CBC auto differential    Collection Time: 03/07/20 11:36 AM   Result Value Ref Range    WBC 6.78 3.90 - 12.70 K/uL    RBC 2.60 (L) 4.00 - 5.40 M/uL    Hemoglobin 7.7 (L) 12.0 - 16.0 g/dL    Hematocrit 24.4 (L) 37.0 - 48.5 %    Mean Corpuscular Volume 94 82 - 98 fL    Mean Corpuscular Hemoglobin 29.6 27.0 - 31.0 pg    Mean Corpuscular Hemoglobin Conc 31.6 (L) 32.0 - 36.0 g/dL    RDW 18.9 (H) 11.5 - 14.5 %    Platelets 28 (LL) 150 - 350 K/uL    MPV 12.0 9.2 - 12.9 fL    Immature Granulocytes 0.7 (H) 0.0 - 0.5 %    Gran # (ANC) 4.9 1.8 - 7.7 K/uL    Immature Grans (Abs) 0.05 (H) 0.00 - 0.04 K/uL    Lymph # 1.0 1.0 - 4.8 K/uL    Mono # 0.6 0.3 - 1.0 K/uL    Eos # 0.1 0.0 - 0.5 K/uL    Baso # 0.02 0.00 - 0.20 K/uL    nRBC 1 (A) 0 /100 WBC    Gran% 72.5 38.0 - 73.0 %    Lymph% 15.2 (L) 18.0 - 48.0 %    Mono% 9.4 4.0 - 15.0 %    Eosinophil% 1.9 0.0 - 8.0 %    Basophil% 0.3 0.0 - 1.9 %    Platelet Estimate Decreased (A)     Aniso Slight     Poly Occasional     Differential Method Automated    Comprehensive metabolic panel    Collection Time: 03/07/20 11:36 AM   Result Value Ref Range    Sodium 137 136 - 145 mmol/L    Potassium 3.2 (L) 3.5 - 5.1 mmol/L    Chloride 101 95 - 110 mmol/L    CO2 28 23 - 29 mmol/L    Glucose 121 (H) 70 - 110 mg/dL    BUN, Bld 20 8 - 23 mg/dL    Creatinine 0.9 0.5 - 1.4 mg/dL    Calcium 7.7 (L) 8.7 - 10.5 mg/dL    Total Protein 5.3 (L) 6.0 - 8.4 g/dL    Albumin 2.5 (L) 3.5 - 5.2 g/dL    Total Bilirubin 1.9 (H) 0.1 - 1.0 mg/dL    Alkaline Phosphatase 231 (H) 55 - 135 U/L    AST 60 (H) 10 - 40 U/L    ALT 51  (H) 10 - 44 U/L    Anion Gap 8 8 - 16 mmol/L    eGFR if African American >60 >60 mL/min/1.73 m^2    eGFR if non African American 57 (A) >60 mL/min/1.73 m^2   Protime-INR    Collection Time: 03/07/20 11:36 AM   Result Value Ref Range    Prothrombin Time 14.1 (H) 9.0 - 12.5 sec    INR 1.3 (H) 0.8 - 1.2   APTT    Collection Time: 03/07/20 11:36 AM   Result Value Ref Range    aPTT 32.1 (H) 21.0 - 32.0 sec   Fibrinogen    Collection Time: 03/07/20 11:36 AM   Result Value Ref Range    Fibrinogen <70 (AA) 182 - 366 mg/dL   TSH    Collection Time: 03/07/20 11:36 AM   Result Value Ref Range    TSH 0.176 (L) 0.400 - 4.000 uIU/mL   T4, free    Collection Time: 03/07/20 11:36 AM   Result Value Ref Range    Free T4 1.08 0.71 - 1.51 ng/dL         Diagnostic Results:  I have reviewed all pertinent imaging results/findings within the past 24 hours.

## 2020-03-07 NOTE — ASSESSMENT & PLAN NOTE
-S/P transfusion x 1 Iin the ED, and one ON 3/5/2020- Improved HnH   -Suspect chronic slow ooze VS DIC   -Stool for occult blood  -Follow up on the HnH

## 2020-03-07 NOTE — PROGRESS NOTES
"[facility] Medicine  Progress Note    Patient Name: Amena Wick  MRN: [id number]  Patient Class: IP- Inpatient   Admission Date: 3/4/2020  Length of Stay: 3 days  Attending Physician: Orquidea Garcia MD  Primary Care Provider: Thai Her MD        Subjective:     Principal Problem:Symptomatic anemia        HPI:  Amena Nash is a 88 YOCF with PMH of dementia who presented to the ED via EMS accompanied by her niece c/o pallor, fatigue and SOB when the pt woke up. . Accordign to the history obtained in the ER , the pt's niece reported several sick contacts at home. Per EMS, the pt presented with an O2 sat of 88% on RA, which improved to 92% on 2L. The pt denies fever, rhinorrhea, abdominal pain and chest pain. She does not use supplemental O2 at home. She does not smoke cigarettes, consume EtOH or use illicit drugs. Further hx is limited due to dementia. No prior tx.      At the time of my examination the pt reported no symptoms, provided the above account and reported that her niece noticed unusual color/pallor on my face  She denied any SOB, palptitations but has recently noticed increased weakness.        Review of patient's allergies indicates:  No Known Allergies    Overview/Hospital Course:  S/P One U of PRBC with subsequent improvement in HnH to 8 from 6.9.     3/5/2020  Improved hemoglobin  Continue to have thrombocytopenia  Niece is on the bed side and although is not POA, refused to be treated for suspected blood dyscrasiaS.  The niece also suspects "Vampires" after her.   Continue to have shortness of breath on minimal exertion    3/5/2020  Baseline dementia  Concerns for DIC, liver disease   Ordered ID work up and liver US     3/6/2020  Seen by Heme/Onc; Suspect DIC,ordered work up   Niece not in agreement for more blood work  The pt has a sister who recommends all her care gone through the niece who is a retired nurse.     3/7/2020  Continue to have oxygen " requirement at 2 LPM   Continue on ASA and Lasix (For pulmonary congestion)      Interval History: Pt has dementia, and replies pleasantly to all queries, niece in the room is communicating on her behlaf.     Review of Systems   Constitutional: Positive for activity change and fatigue. Negative for fever.   HENT: Negative.    Respiratory: Positive for shortness of breath.    Cardiovascular: Negative.    Gastrointestinal: Negative.    Musculoskeletal: Negative.    Neurological: Negative.    Psychiatric/Behavioral: Negative.      Objective:     Vital Signs (Most Recent):  Temp: 97.6 °F (36.4 °C) (03/07/20 1232)  Pulse: 88 (03/07/20 1232)  Resp: 16 (03/07/20 1232)  BP: 129/60 (03/07/20 1232)  SpO2: 97 % (03/07/20 1232) Vital Signs (24h Range):  Temp:  [97.5 °F (36.4 °C)-98.2 °F (36.8 °C)] 97.6 °F (36.4 °C)  Pulse:  [69-88] 88  Resp:  [16-20] 16  SpO2:  [82 %-97 %] 97 %  BP: (103-129)/(52-60) 129/60     Weight: 50.3 kg (110 lb 14.3 oz)  Body mass index is 20.28 kg/m².    Intake/Output Summary (Last 24 hours) at 3/7/2020 1257  Last data filed at 3/7/2020 0545  Gross per 24 hour   Intake --   Output 700 ml   Net -700 ml      Physical Exam   Constitutional: She appears well-developed and well-nourished.   HENT:   Head: Normocephalic.   Left Ear: External ear normal.   Nose: Nose normal.   Eyes: Pupils are equal, round, and reactive to light. Conjunctivae and EOM are normal.   Neck: Normal range of motion. Neck supple.   Pulmonary/Chest: Effort normal and breath sounds normal.   Abdominal: Soft.   Neurological: She is alert. She displays normal reflexes. No cranial nerve deficit or sensory deficit.   Skin: Skin is warm. Capillary refill takes less than 2 seconds.   Psychiatric: She has a normal mood and affect. Judgment and thought content normal.   Nursing note and vitals reviewed.      Significant Labs:   BMP:   Recent Labs   Lab 03/07/20  1136   *      K 3.2*      CO2 28   BUN 20   CREATININE 0.9    CALCIUM 7.7*     CBC:   Recent Labs   Lab 03/06/20  0606 03/06/20  0909 03/07/20  1136   WBC 6.10 6.76 6.78   HGB 7.7* 8.5* 7.7*   HCT 24.8* 27.2* 24.4*   PLT 33* 36* 28*       Significant Imaging: I have reviewed all pertinent imaging results/findings within the past 24 hours.      Assessment/Plan:      * Symptomatic anemia  -Pallor and SOB positive on exam  -S/P transfusion x 1 Iin the ED, and one ON 3/5/2020- Improved HnH   -Suspect chronic slow ooze from GI  vs DIC vs malnutrition with generalized vitamin deficiency, including Vit K   -Stool for occult blood  -Follow up on the HnH, if continue to loose blood, will need to transfuse    DIC (disseminated intravascular coagulation)  -UA positive for leucocytosis and many bacteria  -Follow the blood cultures  -Started on ceftriaxone today   -However UTI as the cause is unlikely  -Elevated INR, Low Fibrinogen level ; suspect for manifestation of generalized malnutrition.       Elevated INR  Suspect nutritional def including Vit K       Elevated liver enzymes  -Will check the viral panel.   -Also will do liver US; niece refuse further testing   -Elevated DIC markers could be in the setting of liver injury,or general nutritional def.           Thrombocytopenia  -S/P Transfusion of platelets in the setting of bruising and suspected mucosal bleeding  -Platelets still low, with drop in HnH despite two units of RBC transfusion, suspect a slow ooze,   -Follow up on the platelets level on daily level and transfuse as needed   -Suspect DIC: Started treatment with Ceftriaxone 1 g BD        Palliative care encounter  Concerns for goals of care  POA  Continue to transfuse RBC and platelets when neede      Dementia with behavioral disturbance  - Stable, chronic  -Continue Delirium precautions.   -Follow psych eval, appreciate psych eval. Evidence of UTI exists, will start on Ceftriaxone, and will follow the clinical status.   -The niece is concerned about the hemoglobin, and  "thinks "Vampires" are possibly collecting her blood.       3/7/2020  Stable, no evidence of psychomotor agitation  Continue to be disoriented        VTE Risk Mitigation (From admission, onward)         Ordered     IP VTE HIGH RISK PATIENT  Once      03/04/20 1811     Place sequential compression device  Until discontinued      03/04/20 1811     Place MERLY hose  Until discontinued      03/04/20 1811                      Orquidea Garcia MD  Department of Hospital Medicine   [facility]  "

## 2020-03-07 NOTE — ASSESSMENT & PLAN NOTE
-UA positive for leucocytosis and many bacteria  -Follow the blood cultures  -Started on ceftriaxone today

## 2020-03-07 NOTE — ASSESSMENT & PLAN NOTE
"- Stable, chronic  -Continue Delirium precautions.   -Follow psych eval, appreciate psych eval. Evidence of UTI exists, will start on Ceftriaxone, and will follow the clinical status.   -The niece is concerned about the hemoglobin, and thinks "Vampires" are possibly collecting her blood.       3/7/2020  Stable, no evidence of psychomotor agitation  Continue to be disoriented    "

## 2020-03-07 NOTE — ASSESSMENT & PLAN NOTE
-Pallor and SOB positive on exam  -S/P transfusion x 1 Iin the ED, and one ON 3/5/2020- Improved HnH   -Suspect chronic slow ooze from GI  vs DIC vs malnutrition with generalized vitamin deficiency, including Vit K   -Stool for occult blood  -Follow up on the HnH, if continue to loose blood, will need to transfuse

## 2020-03-07 NOTE — SUBJECTIVE & OBJECTIVE
Interval History: Pt has dementia, and replies pleasantly to all queries, niece in the room is communicating on her behlaf.     Review of Systems   Constitutional: Positive for activity change and fatigue. Negative for fever.   HENT: Negative.    Respiratory: Positive for shortness of breath.    Cardiovascular: Negative.    Gastrointestinal: Negative.    Musculoskeletal: Negative.    Neurological: Negative.    Psychiatric/Behavioral: Negative.      Objective:     Vital Signs (Most Recent):  Temp: 97.6 °F (36.4 °C) (03/07/20 1232)  Pulse: 88 (03/07/20 1232)  Resp: 16 (03/07/20 1232)  BP: 129/60 (03/07/20 1232)  SpO2: 97 % (03/07/20 1232) Vital Signs (24h Range):  Temp:  [97.5 °F (36.4 °C)-98.2 °F (36.8 °C)] 97.6 °F (36.4 °C)  Pulse:  [69-88] 88  Resp:  [16-20] 16  SpO2:  [82 %-97 %] 97 %  BP: (103-129)/(52-60) 129/60     Weight: 50.3 kg (110 lb 14.3 oz)  Body mass index is 20.28 kg/m².    Intake/Output Summary (Last 24 hours) at 3/7/2020 1257  Last data filed at 3/7/2020 0545  Gross per 24 hour   Intake --   Output 700 ml   Net -700 ml      Physical Exam   Constitutional: She appears well-developed and well-nourished.   HENT:   Head: Normocephalic.   Left Ear: External ear normal.   Nose: Nose normal.   Eyes: Pupils are equal, round, and reactive to light. Conjunctivae and EOM are normal.   Neck: Normal range of motion. Neck supple.   Pulmonary/Chest: Effort normal and breath sounds normal.   Abdominal: Soft.   Neurological: She is alert. She displays normal reflexes. No cranial nerve deficit or sensory deficit.   Skin: Skin is warm. Capillary refill takes less than 2 seconds.   Psychiatric: She has a normal mood and affect. Judgment and thought content normal.   Nursing note and vitals reviewed.      Significant Labs:   BMP:   Recent Labs   Lab 03/07/20  1136   *      K 3.2*      CO2 28   BUN 20   CREATININE 0.9   CALCIUM 7.7*     CBC:   Recent Labs   Lab 03/06/20  0606 03/06/20  0909  03/07/20  1136   WBC 6.10 6.76 6.78   HGB 7.7* 8.5* 7.7*   HCT 24.8* 27.2* 24.4*   PLT 33* 36* 28*       Significant Imaging: I have reviewed all pertinent imaging results/findings within the past 24 hours.

## 2020-03-07 NOTE — ASSESSMENT & PLAN NOTE
-Will check the viral panel.   -Also will do liver US  -Elevated DIC markers could be in the setting of liver injury

## 2020-03-08 PROBLEM — D68.9 COAGULOPATHY: Status: ACTIVE | Noted: 2020-03-06

## 2020-03-08 PROBLEM — J96.01 ACUTE RESPIRATORY FAILURE WITH HYPOXIA AND HYPERCARBIA: Status: ACTIVE | Noted: 2020-03-08

## 2020-03-08 PROBLEM — J96.02 ACUTE RESPIRATORY FAILURE WITH HYPOXIA AND HYPERCARBIA: Status: ACTIVE | Noted: 2020-03-08

## 2020-03-08 LAB
ALBUMIN SERPL BCP-MCNC: 2.7 G/DL (ref 3.5–5.2)
ALP SERPL-CCNC: 249 U/L (ref 55–135)
ALT SERPL W/O P-5'-P-CCNC: 53 U/L (ref 10–44)
ANION GAP SERPL CALC-SCNC: 10 MMOL/L (ref 8–16)
ANION GAP SERPL CALC-SCNC: 9 MMOL/L (ref 8–16)
APTT BLDCRRT: 32.8 SEC (ref 21–32)
AST SERPL-CCNC: 65 U/L (ref 10–40)
BACTERIA UR CULT: ABNORMAL
BASOPHILS # BLD AUTO: 0.02 K/UL (ref 0–0.2)
BASOPHILS NFR BLD: 0.3 % (ref 0–1.9)
BILIRUB SERPL-MCNC: 2.8 MG/DL (ref 0.1–1)
BUN SERPL-MCNC: 17 MG/DL (ref 8–23)
BUN SERPL-MCNC: 17 MG/DL (ref 8–23)
CALCIUM SERPL-MCNC: 7.8 MG/DL (ref 8.7–10.5)
CALCIUM SERPL-MCNC: 7.8 MG/DL (ref 8.7–10.5)
CHLORIDE SERPL-SCNC: 100 MMOL/L (ref 95–110)
CHLORIDE SERPL-SCNC: 99 MMOL/L (ref 95–110)
CO2 SERPL-SCNC: 28 MMOL/L (ref 23–29)
CO2 SERPL-SCNC: 30 MMOL/L (ref 23–29)
CREAT SERPL-MCNC: 0.8 MG/DL (ref 0.5–1.4)
CREAT SERPL-MCNC: 0.8 MG/DL (ref 0.5–1.4)
DIFFERENTIAL METHOD: ABNORMAL
EOSINOPHIL # BLD AUTO: 0.1 K/UL (ref 0–0.5)
EOSINOPHIL NFR BLD: 1.7 % (ref 0–8)
ERYTHROCYTE [DISTWIDTH] IN BLOOD BY AUTOMATED COUNT: 18.6 % (ref 11.5–14.5)
EST. GFR  (AFRICAN AMERICAN): >60 ML/MIN/1.73 M^2
EST. GFR  (AFRICAN AMERICAN): >60 ML/MIN/1.73 M^2
EST. GFR  (NON AFRICAN AMERICAN): >60 ML/MIN/1.73 M^2
EST. GFR  (NON AFRICAN AMERICAN): >60 ML/MIN/1.73 M^2
FIBRINOGEN PPP-MCNC: 115 MG/DL (ref 182–366)
GLUCOSE SERPL-MCNC: 114 MG/DL (ref 70–110)
GLUCOSE SERPL-MCNC: 150 MG/DL (ref 70–110)
HCT VFR BLD AUTO: 25.5 % (ref 37–48.5)
HGB BLD-MCNC: 8.1 G/DL (ref 12–16)
IMM GRANULOCYTES # BLD AUTO: 0.06 K/UL (ref 0–0.04)
IMM GRANULOCYTES NFR BLD AUTO: 0.8 % (ref 0–0.5)
INR PPP: 1.2 (ref 0.8–1.2)
LYMPHOCYTES # BLD AUTO: 0.9 K/UL (ref 1–4.8)
LYMPHOCYTES NFR BLD: 11.4 % (ref 18–48)
MCH RBC QN AUTO: 29.1 PG (ref 27–31)
MCHC RBC AUTO-ENTMCNC: 31.8 G/DL (ref 32–36)
MCV RBC AUTO: 92 FL (ref 82–98)
MONOCYTES # BLD AUTO: 0.7 K/UL (ref 0.3–1)
MONOCYTES NFR BLD: 9.3 % (ref 4–15)
NEUTROPHILS # BLD AUTO: 5.8 K/UL (ref 1.8–7.7)
NEUTROPHILS NFR BLD: 76.5 % (ref 38–73)
NRBC BLD-RTO: 1 /100 WBC
PLATELET # BLD AUTO: 59 K/UL (ref 150–350)
PMV BLD AUTO: 12.3 FL (ref 9.2–12.9)
POTASSIUM SERPL-SCNC: 2.6 MMOL/L (ref 3.5–5.1)
POTASSIUM SERPL-SCNC: 3.4 MMOL/L (ref 3.5–5.1)
PROT SERPL-MCNC: 5.6 G/DL (ref 6–8.4)
PROTHROMBIN TIME: 13.3 SEC (ref 9–12.5)
RBC # BLD AUTO: 2.78 M/UL (ref 4–5.4)
SODIUM SERPL-SCNC: 138 MMOL/L (ref 136–145)
SODIUM SERPL-SCNC: 138 MMOL/L (ref 136–145)
WBC # BLD AUTO: 7.63 K/UL (ref 3.9–12.7)

## 2020-03-08 PROCEDURE — 80053 COMPREHEN METABOLIC PANEL: CPT

## 2020-03-08 PROCEDURE — 94761 N-INVAS EAR/PLS OXIMETRY MLT: CPT

## 2020-03-08 PROCEDURE — 99233 SBSQ HOSP IP/OBS HIGH 50: CPT | Mod: ,,, | Performed by: INTERNAL MEDICINE

## 2020-03-08 PROCEDURE — 85610 PROTHROMBIN TIME: CPT

## 2020-03-08 PROCEDURE — 63600175 PHARM REV CODE 636 W HCPCS: Performed by: FAMILY MEDICINE

## 2020-03-08 PROCEDURE — 84597 ASSAY OF VITAMIN K: CPT

## 2020-03-08 PROCEDURE — 85025 COMPLETE CBC W/AUTO DIFF WBC: CPT

## 2020-03-08 PROCEDURE — 36415 COLL VENOUS BLD VENIPUNCTURE: CPT

## 2020-03-08 PROCEDURE — 80048 BASIC METABOLIC PNL TOTAL CA: CPT

## 2020-03-08 PROCEDURE — 21400001 HC TELEMETRY ROOM

## 2020-03-08 PROCEDURE — 27100171 HC OXYGEN HIGH FLOW UP TO 24 HOURS

## 2020-03-08 PROCEDURE — 85730 THROMBOPLASTIN TIME PARTIAL: CPT

## 2020-03-08 PROCEDURE — 25000003 PHARM REV CODE 250: Performed by: INTERNAL MEDICINE

## 2020-03-08 PROCEDURE — 63600175 PHARM REV CODE 636 W HCPCS: Performed by: INTERNAL MEDICINE

## 2020-03-08 PROCEDURE — 85384 FIBRINOGEN ACTIVITY: CPT

## 2020-03-08 PROCEDURE — 99233 PR SUBSEQUENT HOSPITAL CARE,LEVL III: ICD-10-PCS | Mod: ,,, | Performed by: INTERNAL MEDICINE

## 2020-03-08 RX ORDER — FUROSEMIDE 10 MG/ML
20 INJECTION INTRAMUSCULAR; INTRAVENOUS ONCE
Status: COMPLETED | OUTPATIENT
Start: 2020-03-08 | End: 2020-03-08

## 2020-03-08 RX ORDER — POTASSIUM CHLORIDE 20 MEQ/1
60 TABLET, EXTENDED RELEASE ORAL ONCE
Status: COMPLETED | OUTPATIENT
Start: 2020-03-08 | End: 2020-03-08

## 2020-03-08 RX ORDER — POTASSIUM CHLORIDE 7.45 MG/ML
10 INJECTION INTRAVENOUS
Status: COMPLETED | OUTPATIENT
Start: 2020-03-08 | End: 2020-03-08

## 2020-03-08 RX ADMIN — POTASSIUM CHLORIDE 10 MEQ: 10 INJECTION, SOLUTION INTRAVENOUS at 03:03

## 2020-03-08 RX ADMIN — POTASSIUM CHLORIDE 10 MEQ: 10 INJECTION, SOLUTION INTRAVENOUS at 10:03

## 2020-03-08 RX ADMIN — PANTOPRAZOLE SODIUM 40 MG: 40 TABLET, DELAYED RELEASE ORAL at 08:03

## 2020-03-08 RX ADMIN — CEFTRIAXONE 1 G: 1 INJECTION, SOLUTION INTRAVENOUS at 01:03

## 2020-03-08 RX ADMIN — POTASSIUM CHLORIDE 60 MEQ: 20 TABLET, EXTENDED RELEASE ORAL at 08:03

## 2020-03-08 RX ADMIN — FUROSEMIDE 20 MG: 10 INJECTION, SOLUTION INTRAMUSCULAR; INTRAVENOUS at 01:03

## 2020-03-08 RX ADMIN — POTASSIUM CHLORIDE 10 MEQ: 10 INJECTION, SOLUTION INTRAVENOUS at 08:03

## 2020-03-08 RX ADMIN — POTASSIUM CHLORIDE 10 MEQ: 10 INJECTION, SOLUTION INTRAVENOUS at 01:03

## 2020-03-08 RX ADMIN — CEFTRIAXONE 1 G: 1 INJECTION, SOLUTION INTRAVENOUS at 09:03

## 2020-03-08 NOTE — NURSING
Patient resting more comfortably at present time . O2 sats stable on 35% VM. Patients niece remains at bedside.

## 2020-03-08 NOTE — NURSING
Received bedside report from Rafaela. Discussed plan of care, pain management and safety measures with patient and her niece. Patient in no distress. Rafaela in process of starting platelets.

## 2020-03-08 NOTE — ASSESSMENT & PLAN NOTE
Concerns for goals of care, POA  Continue to transfuse RBC and platelets when needed  Will complete the work up to find the find the final diagnosis for her symptomatic anemia and thrombocytopenia.

## 2020-03-08 NOTE — NURSING
Patient complainting of chest pain. /59 HR 89 Sats 88 % on # liters. Placed back on VM Sats up to  %. Patient repositioned  Burped. Dr. Garcia on unit aware.

## 2020-03-08 NOTE — CONSULTS
.[facility]  Gastroenterology  Consult Note    Patient Name: Amena Wick  MRN: [id number]  Admission Date: 3/4/2020  Hospital Length of Stay: 4 days  Code Status: Full Code   Primary Care Physician: Thai Her MD  Principal Problem:Liver dysfunction    Consults  Subjective:     Chief complaint: anemia    HPI: 87 yo F presented with severe faitgue and lethargy precipitated by severe anemia; also noted to have abnl LFTs, thrombocytopenia contributing.  Per niece, no known liver disease or overt bleeding.  Minimal improvement  With transfusions.  Asked to evaluate for GI bleed.  Family currently wishes minimal interventions due to patient's overall condition and limited life span (end stage alzheimers).    Past medical history:  Past Medical History:   Diagnosis Date    Dementia     Encounter for blood transfusion 03/04/2020    no previous transfusion history    Requires assistance with activities of daily living (ADL)     Symptomatic anemia 03/04/2020       Past surgical history:  Past Surgical History:   Procedure Laterality Date    NO PAST SURGERIES  03/04/2020       Social history:  Social History     Socioeconomic History    Marital status:      Spouse name: Not on file    Number of children: Not on file    Years of education: Not on file    Highest education level: Not on file   Occupational History    Not on file   Social Needs    Financial resource strain: Not on file    Food insecurity:     Worry: Not on file     Inability: Not on file    Transportation needs:     Medical: Not on file     Non-medical: Not on file   Tobacco Use    Smoking status: Former Smoker   Substance and Sexual Activity    Alcohol use: Not Currently    Drug use: Not Currently    Sexual activity: Not on file   Lifestyle    Physical activity:     Days per week: Not on file     Minutes per session: Not on file    Stress: Not on file   Relationships    Social connections:     Talks on  phone: Not on file     Gets together: Not on file     Attends Confucianism service: Not on file     Active member of club or organization: Not on file     Attends meetings of clubs or organizations: Not on file     Relationship status: Not on file   Other Topics Concern    Not on file   Social History Narrative    Not on file       Family history:  History reviewed. No pertinent family history.    Medications:  Medications Prior to Admission   Medication Sig Dispense Refill Last Dose    aspirin (ECOTRIN) 81 MG EC tablet TAKE 1 TABLET BY MOUTH ONCE DAILY   3/4/2020 at Unknown time    hydroCHLOROthiazide (HYDRODIURIL) 25 MG tablet    3/4/2020 at Unknown time    multivitamin capsule Take 1 capsule by mouth once daily.   3/4/2020 at Unknown time       Allergies:  Review of patient's allergies indicates:  No Known Allergies    Review of systems:  Unable to obtain due to AMS    Objective:     Vital Signs (Most Recent):  Temp: 97.7 °F (36.5 °C) (03/08/20 1131)  Pulse: 69 (03/08/20 1131)  Resp: 18 (03/08/20 1131)  BP: (!) 98/54 (03/08/20 1131)  SpO2: 95 % (03/08/20 1131) Vital Signs (24h Range):  Temp:  [97.4 °F (36.3 °C)-98.8 °F (37.1 °C)] 97.7 °F (36.5 °C)  Pulse:  [69-92] 69  Resp:  [16-22] 18  SpO2:  [86 %-99 %] 95 %  BP: ()/(54-69) 98/54     Physical examination:  General: thin, cachectic appearing, no apparent distress  HENT: NCAT, hearing grossly intact, no palpable or visible thyroid mass  Eyes: PERRL, EOMI, anicteric sclera  LYMH: No cervical, supraclavicular or axillary lymphadenopathy   Cardiovascular: Regular rate and rhythm. No murmurs appreciated.  Lungs: Non-labored respirations. Breath sounds equal.   Abdomen: soft, mildly distended and tympanitic, normoactive BS  Extremities: No C/C/E, 2+ dorsalis pedis pulses bilaterally  Neuro: AA&O x 0, no asterixes or tremors  Psych: Appropriate mood and affect. No SI.  Skin: No jaundice, rashes or lesions      Labs:  CBC:   Recent Labs   Lab 03/07/20  1136  03/08/20  0649   WBC 6.78 7.63   HGB 7.7* 8.1*   HCT 24.4* 25.5*   PLT 28* 59*     CMP:   Recent Labs   Lab 03/08/20  0649   *   CALCIUM 7.8*   ALBUMIN 2.7*   PROT 5.6*      K 2.6*   CO2 30*   CL 99   BUN 17   CREATININE 0.8   ALKPHOS 249*   ALT 53*   AST 65*   BILITOT 2.8*       Assessment:   Abnl LFTs  Anemia ? Blood loss  Thrombocytopenia  End stage dementia    Plan:   Check hepatitis panel  Hold off endoscopy given goals of care  RUQ ultrasound (family now agreeable to this)  Transfuse PLT > 50K  CBC, LFTs in AM        Thank you for your consult.     Casa Adkins MD  Gastroenterology  [facility]

## 2020-03-08 NOTE — NURSING
Patients sats keep dropping into upper 80's and patient restless. Placed on 35% VM Sats increased to 94-95%- patient states she feels better

## 2020-03-08 NOTE — NURSING
Patient's family reported she turned down the rate on her transfusion to 90 because she saw the patient blow her nose and felt she is going into congestive heart failure and is unable to tolerate it. The family was given instructions not to touch the pump again due to safety reasons. Rate change documented on blood flowsheet with Chela Canseco RN.

## 2020-03-08 NOTE — ASSESSMENT & PLAN NOTE
-S/P Transfusion of platelets in the setting of bruising and suspected mucosal bleeding  -Platelets still low, with drop in HnH despite two units of RBC transfusion, suspect a slow ooze,   -Follow up on the platelets level on daily level and transfuse as needed   -Continue to follow,will transfuse to keep at appropriate level as per Heme/onc recommendations.

## 2020-03-08 NOTE — ASSESSMENT & PLAN NOTE
-Pallor and SOB positive on exam  -S/P transfusion x 1 Iin the ED, and one ON 3/5/2020- Improved HnH but then dropped  -Suspect chronic slow ooze from GI  vs DIC vs malnutrition with generalized vitamin deficiency, including Vit K   -Stool for occult blood  -Follow up on the HnH, if continue to loose blood, will need to transfuse  -GI consulted, recommend US live, ordered already.

## 2020-03-08 NOTE — PLAN OF CARE
Problem: Fall Injury Risk  Goal: Absence of Fall and Fall-Related Injury  Outcome: Ongoing, Progressing  Intervention: Identify and Manage Contributors to Fall Injury Risk  Flowsheets (Taken 3/8/2020 1611)  Self-Care Promotion: independence encouraged; BADL personal routines maintained  Medication Review/Management: infusion held  Intervention: Promote Injury-Free Environment  Flowsheets (Taken 3/8/2020 1611)  Safety Promotion/Fall Prevention: family to remain at bedside; side rails raised x 2  Environmental Safety Modification: assistive device/personal items within reach; mobility aid in reach

## 2020-03-08 NOTE — SUBJECTIVE & OBJECTIVE
Interval History: Pt is asleep, awakend, opening eyes spontaneously.       Review of Systems   Constitutional: Positive for activity change. Negative for fatigue.   HENT: Negative.    Eyes: Negative.    Respiratory: Negative.    Endocrine: Negative.    Genitourinary: Negative.    Neurological: Positive for facial asymmetry, speech difficulty and weakness.     Objective:     Vital Signs (Most Recent):  Temp: 99.1 °F (37.3 °C) (03/08/20 1521)  Pulse: 87 (03/08/20 1521)  Resp: 18 (03/08/20 1521)  BP: (!) 110/58 (03/08/20 1521)  SpO2: (!) 93 % (03/08/20 1521) Vital Signs (24h Range):  Temp:  [97.4 °F (36.3 °C)-99.1 °F (37.3 °C)] 99.1 °F (37.3 °C)  Pulse:  [69-92] 87  Resp:  [16-22] 18  SpO2:  [86 %-99 %] 93 %  BP: ()/(54-69) 110/58     Weight: 50.2 kg (110 lb 10.7 oz)  Body mass index is 20.24 kg/m².    Intake/Output Summary (Last 24 hours) at 3/8/2020 1750  Last data filed at 3/8/2020 0354  Gross per 24 hour   Intake 459.59 ml   Output 875 ml   Net -415.41 ml      Physical Exam   Constitutional:   Malnourished   HENT:   Head: Normocephalic and atraumatic.   Left Ear: External ear normal.   Nose: Nose normal.   Eyes: Pupils are equal, round, and reactive to light. Conjunctivae and EOM are normal.   Neck: Normal range of motion. Neck supple. No JVD present. No thyromegaly present.   Cardiovascular: Normal rate, regular rhythm and normal heart sounds.   Pulmonary/Chest: Effort normal and breath sounds normal.   Abdominal: Soft. Bowel sounds are normal. She exhibits no distension and no mass. There is no guarding. No hernia.   Musculoskeletal: Normal range of motion. She exhibits no edema, tenderness or deformity.   Lymphadenopathy:     She has no cervical adenopathy.   Neurological: She is alert.   Advanced dementia     Psychiatric:   Advanced dementia      Nursing note and vitals reviewed.      Significant Labs:   BMP:   Recent Labs   Lab 03/08/20  0649   *      K 2.6*   CL 99   CO2 30*   BUN 17    CREATININE 0.8   CALCIUM 7.8*     CBC:   Recent Labs   Lab 03/07/20  1136 03/08/20  0649   WBC 6.78 7.63   HGB 7.7* 8.1*   HCT 24.4* 25.5*   PLT 28* 59*       Significant Imaging: I have reviewed all pertinent imaging results/findings within the past 24 hours.

## 2020-03-08 NOTE — NURSING
Patient SOB- bladder distended- patient unable to void- notified Dr Valencia. I&O catheter resulted in 350 ml urine output. DC'd IV NS and patient given IV lasix as ordered.

## 2020-03-08 NOTE — PROGRESS NOTES
"[facility] [place] Medicine  Progress Note    Patient Name: Amena Wick  MRN: [id number]  Patient Class: IP- Inpatient   Admission Date: 3/4/2020  Length of Stay: 4 days  Attending Physician: Orquidea Garcia MD  Primary Care Provider: Thai Her MD        Subjective:     Principal Problem:Symptomatic anemia        HPI:  Amena Nash is a 88 YOCF with PMH of dementia who presented to the ED via EMS accompanied by her niece c/o pallor, fatigue and SOB when the pt woke up. . Accordign to the history obtained in the ER , the pt's niece reported several sick contacts at home. Per EMS, the pt presented with an O2 sat of 88% on RA, which improved to 92% on 2L. The pt denies fever, rhinorrhea, abdominal pain and chest pain. She does not use supplemental O2 at home. She does not smoke cigarettes, consume EtOH or use illicit drugs. Further hx is limited due to dementia. No prior tx.      At the time of my examination the pt reported no symptoms, provided the above account and reported that her niece noticed unusual color/pallor on my face  She denied any SOB, palptitations but has recently noticed increased weakness.        Review of patient's allergies indicates:  No Known Allergies    Overview/Hospital Course:  S/P One U of PRBC with subsequent improvement in HnH to 8 from 6.9.     3/5/2020  Improved hemoglobin  Continue to have thrombocytopenia  Niece is on the bed side and although is not POA, refused to be treated for suspected blood dyscrasiaS.  The niece also suspects "Vampires" after her.   Continue to have shortness of breath on minimal exertion    3/5/2020  Baseline dementia  Concerns for DIC, liver disease   Ordered ID work up and liver US     3/6/2020  Seen by Heme/Onc; Suspect DIC,ordered work up   Niece not in agreement for more blood work  The pt has a sister who recommends all her care gone through the niece who is a retired nurse.     3/7/2020  Continue to have oxygen " requirement at 2 LPM   Continue on ASA and Lasix (For pulmonary congestion)  3/8/2020  The pt is demented  Sleepy this AM  Spoke with the family member   They want to do investigations and treat her  GI consulted  CT ABD/PELVIS WIth contrast to order   End stage dementia      Interval History: Pt is asleep, awakend, opening eyes spontaneously.       Review of Systems   Constitutional: Positive for activity change. Negative for fatigue.   HENT: Negative.    Eyes: Negative.    Respiratory: Negative.    Endocrine: Negative.    Genitourinary: Negative.    Neurological: Positive for facial asymmetry, speech difficulty and weakness.     Objective:     Vital Signs (Most Recent):  Temp: 99.1 °F (37.3 °C) (03/08/20 1521)  Pulse: 87 (03/08/20 1521)  Resp: 18 (03/08/20 1521)  BP: (!) 110/58 (03/08/20 1521)  SpO2: (!) 93 % (03/08/20 1521) Vital Signs (24h Range):  Temp:  [97.4 °F (36.3 °C)-99.1 °F (37.3 °C)] 99.1 °F (37.3 °C)  Pulse:  [69-92] 87  Resp:  [16-22] 18  SpO2:  [86 %-99 %] 93 %  BP: ()/(54-69) 110/58     Weight: 50.2 kg (110 lb 10.7 oz)  Body mass index is 20.24 kg/m².    Intake/Output Summary (Last 24 hours) at 3/8/2020 1750  Last data filed at 3/8/2020 0354  Gross per 24 hour   Intake 459.59 ml   Output 875 ml   Net -415.41 ml      Physical Exam   Constitutional:   Malnourished   HENT:   Head: Normocephalic and atraumatic.   Left Ear: External ear normal.   Nose: Nose normal.   Eyes: Pupils are equal, round, and reactive to light. Conjunctivae and EOM are normal.   Neck: Normal range of motion. Neck supple. No JVD present. No thyromegaly present.   Cardiovascular: Normal rate, regular rhythm and normal heart sounds.   Pulmonary/Chest: Effort normal and breath sounds normal.   Abdominal: Soft. Bowel sounds are normal. She exhibits no distension and no mass. There is no guarding. No hernia.   Musculoskeletal: Normal range of motion. She exhibits no edema, tenderness or deformity.   Lymphadenopathy:     She has  no cervical adenopathy.   Neurological: She is alert.   Advanced dementia     Psychiatric:   Advanced dementia      Nursing note and vitals reviewed.      Significant Labs:   BMP:   Recent Labs   Lab 03/08/20  0649   *      K 2.6*   CL 99   CO2 30*   BUN 17   CREATININE 0.8   CALCIUM 7.8*     CBC:   Recent Labs   Lab 03/07/20  1136 03/08/20  0649   WBC 6.78 7.63   HGB 7.7* 8.1*   HCT 24.4* 25.5*   PLT 28* 59*       Significant Imaging: I have reviewed all pertinent imaging results/findings within the past 24 hours.      Assessment/Plan:      * Symptomatic anemia  -Pallor and SOB positive on exam  -S/P transfusion x 1 Iin the ED, and one ON 3/5/2020- Improved HnH but then dropped  -Suspect chronic slow ooze from GI  vs DIC vs malnutrition with generalized vitamin deficiency, including Vit K   -Stool for occult blood  -Follow up on the HnH, if continue to loose blood, will need to transfuse  -GI consulted, recommend US live, ordered already.       Liver dysfunction  -UA positive for leucocytosis and many bacteria  -Follow the blood cultures  -Started on ceftriaxone today   -However UTI as the cause is unlikely  -Elevated INR, Low Fibrinogen level ; suspect for manifestation of generalized malnutrition.       Elevated INR  Suspect nutritional def including Vit K       Elevated liver enzymes  -Will check the viral panel.   -Also will do liver US; niece refuse further testing   -Elevated DIC markers could be in the setting of liver injury,or general nutritional def.           Thrombocytopenia  -S/P Transfusion of platelets in the setting of bruising and suspected mucosal bleeding  -Platelets still low, with drop in HnH despite two units of RBC transfusion, suspect a slow ooze,   -Follow up on the platelets level on daily level and transfuse as needed   -Continue to follow,will transfuse to keep at appropriate level as per Heme/onc recommendations.         Palliative care encounter  Concerns for goals of  "care, POA  Continue to transfuse RBC and platelets when needed  Will complete the work up to find the find the final diagnosis for her symptomatic anemia and thrombocytopenia.       Dementia with behavioral disturbance  - Stable, chronic  -Continue Delirium precautions.   -Follow psych eval, appreciate psych eval. Evidence of UTI exists, will start on Ceftriaxone, and will follow the clinical status.   -The niece is concerned about the hemoglobin, and thinks "Vampires" are possibly collecting her blood.       3/7/2020  Stable, no evidence of psychomotor agitation  Continue to be disoriented        VTE Risk Mitigation (From admission, onward)         Ordered     IP VTE HIGH RISK PATIENT  Once      03/04/20 1811     Place sequential compression device  Until discontinued      03/04/20 1811     Place MERLY hose  Until discontinued      03/04/20 1811                      Orquidea Garcia MD  Department of Hospital Medicine   [facility]  "

## 2020-03-08 NOTE — NURSING
Note that patient guarding her abdomen, burping and niece repots a history of acid reflux;   Refusing potassium tablets;    Notified Dr. Warner;  XR abdomen ordered;

## 2020-03-09 PROBLEM — R59.1 LYMPHADENOPATHY: Status: ACTIVE | Noted: 2020-03-09

## 2020-03-09 PROBLEM — R93.5 ABNORMAL CT OF THE ABDOMEN: Status: ACTIVE | Noted: 2020-03-09

## 2020-03-09 PROBLEM — E43 SEVERE MALNUTRITION: Status: ACTIVE | Noted: 2020-03-09

## 2020-03-09 LAB
ALBUMIN SERPL BCP-MCNC: 2.5 G/DL (ref 3.5–5.2)
ALP SERPL-CCNC: 248 U/L (ref 55–135)
ALT SERPL W/O P-5'-P-CCNC: 49 U/L (ref 10–44)
ANION GAP SERPL CALC-SCNC: 8 MMOL/L (ref 8–16)
APTT BLDCRRT: 33.4 SEC (ref 21–32)
AST SERPL-CCNC: 66 U/L (ref 10–40)
BACTERIA BLD CULT: NORMAL
BACTERIA BLD CULT: NORMAL
BASOPHILS # BLD AUTO: 0.02 K/UL (ref 0–0.2)
BASOPHILS NFR BLD: 0.3 % (ref 0–1.9)
BILIRUB SERPL-MCNC: 2.6 MG/DL (ref 0.1–1)
BUN SERPL-MCNC: 17 MG/DL (ref 8–23)
CALCIUM SERPL-MCNC: 7.7 MG/DL (ref 8.7–10.5)
CHLORIDE SERPL-SCNC: 104 MMOL/L (ref 95–110)
CO2 SERPL-SCNC: 29 MMOL/L (ref 23–29)
CREAT SERPL-MCNC: 0.8 MG/DL (ref 0.5–1.4)
DIFFERENTIAL METHOD: ABNORMAL
EOSINOPHIL # BLD AUTO: 0.1 K/UL (ref 0–0.5)
EOSINOPHIL NFR BLD: 1.1 % (ref 0–8)
ERYTHROCYTE [DISTWIDTH] IN BLOOD BY AUTOMATED COUNT: 18.9 % (ref 11.5–14.5)
EST. GFR  (AFRICAN AMERICAN): >60 ML/MIN/1.73 M^2
EST. GFR  (NON AFRICAN AMERICAN): >60 ML/MIN/1.73 M^2
FACT VIII ACT/NOR PPP: 70 % (ref 60–170)
FIBRINOGEN PPP-MCNC: 80 MG/DL (ref 182–366)
GLUCOSE SERPL-MCNC: 117 MG/DL (ref 70–110)
HAV IGM SERPL QL IA: NEGATIVE
HBV CORE IGM SERPL QL IA: NEGATIVE
HBV SURFACE AG SERPL QL IA: NEGATIVE
HCT VFR BLD AUTO: 25 % (ref 37–48.5)
HCV AB SERPL QL IA: NEGATIVE
HGB BLD-MCNC: 7.8 G/DL (ref 12–16)
HIV 1+2 AB+HIV1 P24 AG SERPL QL IA: NEGATIVE
IMM GRANULOCYTES # BLD AUTO: 0.05 K/UL (ref 0–0.04)
IMM GRANULOCYTES NFR BLD AUTO: 0.7 % (ref 0–0.5)
INR PPP: 1.2 (ref 0.8–1.2)
LYMPHOCYTES # BLD AUTO: 1.3 K/UL (ref 1–4.8)
LYMPHOCYTES NFR BLD: 18.3 % (ref 18–48)
MCH RBC QN AUTO: 29 PG (ref 27–31)
MCHC RBC AUTO-ENTMCNC: 31.2 G/DL (ref 32–36)
MCV RBC AUTO: 93 FL (ref 82–98)
MONOCYTES # BLD AUTO: 0.7 K/UL (ref 0.3–1)
MONOCYTES NFR BLD: 9.4 % (ref 4–15)
NEUTROPHILS # BLD AUTO: 4.9 K/UL (ref 1.8–7.7)
NEUTROPHILS NFR BLD: 70.2 % (ref 38–73)
NRBC BLD-RTO: 1 /100 WBC
PATH REV BLD -IMP: NORMAL
PLATELET # BLD AUTO: 33 K/UL (ref 150–350)
PMV BLD AUTO: 13 FL (ref 9.2–12.9)
POTASSIUM SERPL-SCNC: 4.2 MMOL/L (ref 3.5–5.1)
PROT SERPL-MCNC: 5.2 G/DL (ref 6–8.4)
PROTHROMBIN TIME: 13.6 SEC (ref 9–12.5)
RBC # BLD AUTO: 2.69 M/UL (ref 4–5.4)
RETICS/RBC NFR AUTO: 7 % (ref 0.5–2.5)
SODIUM SERPL-SCNC: 141 MMOL/L (ref 136–145)
WBC # BLD AUTO: 7.03 K/UL (ref 3.9–12.7)

## 2020-03-09 PROCEDURE — 87070 CULTURE OTHR SPECIMN AEROBIC: CPT

## 2020-03-09 PROCEDURE — 87205 SMEAR GRAM STAIN: CPT

## 2020-03-09 PROCEDURE — 99232 SBSQ HOSP IP/OBS MODERATE 35: CPT | Mod: ,,, | Performed by: NURSE PRACTITIONER

## 2020-03-09 PROCEDURE — 21400001 HC TELEMETRY ROOM

## 2020-03-09 PROCEDURE — 85610 PROTHROMBIN TIME: CPT

## 2020-03-09 PROCEDURE — 36415 COLL VENOUS BLD VENIPUNCTURE: CPT

## 2020-03-09 PROCEDURE — 99223 PR INITIAL HOSPITAL CARE,LEVL III: ICD-10-PCS | Mod: ,,, | Performed by: INTERNAL MEDICINE

## 2020-03-09 PROCEDURE — 80053 COMPREHEN METABOLIC PANEL: CPT

## 2020-03-09 PROCEDURE — 25000003 PHARM REV CODE 250: Performed by: INTERNAL MEDICINE

## 2020-03-09 PROCEDURE — 99233 PR SUBSEQUENT HOSPITAL CARE,LEVL III: ICD-10-PCS | Mod: ,,, | Performed by: INTERNAL MEDICINE

## 2020-03-09 PROCEDURE — 97802 MEDICAL NUTRITION INDIV IN: CPT

## 2020-03-09 PROCEDURE — 63600175 PHARM REV CODE 636 W HCPCS: Performed by: INTERNAL MEDICINE

## 2020-03-09 PROCEDURE — 85730 THROMBOPLASTIN TIME PARTIAL: CPT

## 2020-03-09 PROCEDURE — 99223 1ST HOSP IP/OBS HIGH 75: CPT | Mod: ,,, | Performed by: INTERNAL MEDICINE

## 2020-03-09 PROCEDURE — 99232 PR SUBSEQUENT HOSPITAL CARE,LEVL II: ICD-10-PCS | Mod: ,,, | Performed by: NURSE PRACTITIONER

## 2020-03-09 PROCEDURE — 85384 FIBRINOGEN ACTIVITY: CPT

## 2020-03-09 PROCEDURE — 85045 AUTOMATED RETICULOCYTE COUNT: CPT

## 2020-03-09 PROCEDURE — 85025 COMPLETE CBC W/AUTO DIFF WBC: CPT

## 2020-03-09 PROCEDURE — 25500020 PHARM REV CODE 255: Performed by: INTERNAL MEDICINE

## 2020-03-09 PROCEDURE — 99233 SBSQ HOSP IP/OBS HIGH 50: CPT | Mod: ,,, | Performed by: INTERNAL MEDICINE

## 2020-03-09 RX ADMIN — CEFTRIAXONE 1 G: 1 INJECTION, SOLUTION INTRAVENOUS at 09:03

## 2020-03-09 RX ADMIN — PANTOPRAZOLE SODIUM 40 MG: 40 TABLET, DELAYED RELEASE ORAL at 09:03

## 2020-03-09 RX ADMIN — IOHEXOL 75 ML: 350 INJECTION, SOLUTION INTRAVENOUS at 08:03

## 2020-03-09 NOTE — PROGRESS NOTES
"F/U with patient and 2 sisters at bedside.  Dr Mckeon present to give update.   Awaiting CT results.     Patient is withdrawn this am and curled up in fetal position. She is not wanting to interact and will not answer questions or follow commands. She does not want to be touched. Addressed questions and concerns and one of the patient's sisters reports she is afraid to say too much about the patient's status because she may not say the right thing according to Dagmar the niece, who is not present. I asked what she meant and she states Dagmar is the POA and when I asked about documents she states "no we have no documents she just knows the medical things and we have designated her to make the decisions." I explained that without a legal document there is no one person to make decisions and that all family input is accepted. However, if they choose her to be the surrogate we can document this but there cannot be a POA document done now that patient lacks capacity. We did discuss the patient's advanced Dementia and Psychiatry's assessment and that this may be the patient's new baseline as Dementia is often progressed by a traumatic event such as a hospitalization. We also dicussed that with patient's advanced Dementia and age there needs to be a discussion about predicted outcomes and goals of care once we receive all results. Vannesa will be here this afternoon per family and will f/u with her.        Addendum: 1330  CT results back. Vannesa has not arrived yet. Will discuss findings with Heme onc before discussion with family          > 50% of 25 min visit spent in chart review, face to face discussion of goals of care,  symptom assessment, coordination of care and emotional support.     "

## 2020-03-09 NOTE — ASSESSMENT & PLAN NOTE
-The patient is unlikely to have DIC given normal haptoglobin  -The patient's elevated INR/PTT, low fibrinogen and thrombocytopenia are likely from liver dysfunction  -Pt to undergo CT fo the abdomen and pelvis with contraast  -Hepatitis panel pending  -Checking Vitamin K given h/o poor po intake at home  -Factor VIII level pending  -Consider hepatology consult

## 2020-03-09 NOTE — CONSULTS
[facility]  Adult Nutrition  Consult Note    SUMMARY     Recommendations    Recommendation:   1. Continue current regular diet order as tolerated.   2. Continue to provide Boost Pudding TID and Beneprotein TID.   3. Encourage intake of meals and supplements.   4. If pt refuses Beneprotein consider addition of Boost Plus TID.     Goals:   1. Pt PO intake >/= 75% EEN/EPN daily.    Nutrition Goal Status: new  Communication of RD Recs: other (comment)(POC)    Reason for Assessment    Reason For Assessment: consult(Failure to thrive)  Diagnosis: other (see comments)(symptomatic anemia)  Relevant Medical History: Dementia, symptomatic anemia, assistance with ADL  Interdisciplinary Rounds: did not attend  General Information Comments: Pt asleep in the room with sisters. Sisters report that pt's neice is in charge, per chart review nejulian Dagmar is not the offical POA. Sisters report pt is not eating much but did eat the Boost Puddings. Pt did not answer questions on her own when awake while RD in the room. Sister's reported neice would be here in afternoon, RD attempted to revisit the room but no family was present and pt was sleeping. Per chart review pt does not want to be touched. NFPE unable to be completed today r/t pt refused, visually pt has severe muscle wasting and subcutaneous fat loss. Per chart review pt weighed 113 lbs on 4/4/19 displaying stable wt. But was previously 124 lbs on 11/21/18.   Nutrition Discharge Planning: d/c on high calorie/high protein diet with ONS supplementation as tolerated    Nutrition Risk Screen    Nutrition Risk Screen: no indicators present    Nutrition/Diet History    Food Preferences: no cultural or spiritual preferences identified  Spiritual, Cultural Beliefs, Roman [employer] Practices, Values that Affect Care: no  Food Allergies: NKFA  Factors Affecting Nutritional Intake: decreased appetite, early satiety, other (see comments)(dementia)    Anthropometrics    Temp:  "96.9 °F (36.1 °C)  Height Method: Estimated  Height: 5' 2" (157.5 cm)  Height (inches): 62 in  Weight Method: Bed Scale  Weight: 50.2 kg (110 lb 10.7 oz)  Weight (lb): 110.67 lb  Ideal Body Weight (IBW), Female: 110 lb  % Ideal Body Weight, Female (lb): 100.61 %  BMI (Calculated): 20.2  BMI Grade: 18.5-24.9 - normal       Lab/Procedures/Meds    Pertinent Labs Reviewed: reviewed  Pertinent Labs Comments: H/H 7.8/25.0, Glu 117, Ca 7.7, Alk Phos 248, Total Pro 5.2, Alb 2.5, Bili 2., AST 66, ALT 49  Pertinent Medications Reviewed: reviewed  Pertinent Medications Comments: rocephin, pantoprazole, KCl    Physical Findings/Assessment    Rubio Score: 13    Estimated/Assessed Needs    Weight Used For Calorie Calculations: 50.2 kg (110 lb 10.7 oz)  Energy Calorie Requirements (kcal): 1506(30 kcal/kg)  Energy Need Method: Kcal/kg(30 kcal/kg)  Protein Requirements: 60(1.2 gm/kg)  Weight Used For Protein Calculations: 50.2 kg (110 lb 10.7 oz)     Estimated Fluid Requirement Method: RDA Method(or Per MD)  RDA Method (mL): 1506         Nutrition Prescription Ordered    Current Diet Order: Regular   Oral Nutrition Supplement: Boost Pudding TID, Beneprotein TID    Evaluation of Received Nutrient/Fluid Intake    I/O: N/A  Energy Calories Required: not meeting needs  Protein Required: not meeting needs  Fluid Required: meeting needs  Comments: LBM 3/7  Tolerance: tolerating  % Intake of Estimated Energy Needs: 25 - 50 %  % Meal Intake: 25 - 50 %    Nutrition Risk    Level of Risk/Frequency of Follow-up: (2 x/week)     Assessment and Plan    Severe malnutrition  Nutrition Problem:  Severe Protein-Calorie Malnutrition  Malnutrition in the context of Chronic Illness/Injury    Related to (etiology):  dementia    Signs and Symptoms (as evidenced by):  Energy Intake: <50% of estimated energy requirement for 1 week or greater  Body Fat Depletion: severe depletion of orbitals and triceps   Muscle Mass Depletion: severe depletion of temples " and clavicle region   Weight Loss: 11% x 1.33 years   Fluid Accumulation: none noted    Interventions(treatment strategy):  Collaboration with other providers  Commercial Food/Beverage  Nutrition Supplementation    Nutrition Diagnosis Status:  New           Monitor and Evaluation    Food and Nutrient Intake: energy intake, food and beverage intake  Food and Nutrient Adminstration: diet order  Knowledge/Beliefs/Attitudes: food and nutrition knowledge/skill  Physical Activity and Function: nutrition-related ADLs and IADLs  Anthropometric Measurements: weight, weight change, body mass index  Biochemical Data, Medical Tests and Procedures: electrolyte and renal panel, gastrointestinal profile, glucose/endocrine profile, inflammatory profile, lipid profile  Nutrition-Focused Physical Findings: overall appearance     Malnutrition Assessment  Energy Intake: severe energy intake          Energy Intake (Malnutrition): less than or equal to 50% for greater than or equal to 5 days  Subcutaneous Fat (Malnutrition): severe depletion  Muscle Mass (Malnutrition): severe depletion   Orbital Region (Subcutaneous Fat Loss): severe depletion  Upper Arm Region (Subcutaneous Fat Loss): severe depletion   [place] Region (Muscle Loss): severe depletion  Clavicle Bone Region (Muscle Loss): severe depletion  Clavicle and Acromion Bone Region (Muscle Loss): severe depletion   Edema (Fluid Accumulation): 0-->no edema present   Subcutaneous Fat Loss (Final Summary): severe protein-calorie malnutrition  Muscle Loss Evaluation (Final Summary): severe protein-calorie malnutrition         Nutrition Follow-Up    RD Follow-up?: Yes

## 2020-03-09 NOTE — ASSESSMENT & PLAN NOTE
CT imaging shows Multiple well demarcated hypodense nodules in the mediastinum, likely representing cystic adenopathy,  left supraclavicular region there is a cystic appearing lesion, likely representing a necrotic nodes possibly metastatic lymphadenopathy vs tuberculous adenitis can not be totally excluded. In addition,  Numerous hypodense nodules throughout the enlarged liver and the spleen either representing metastatic disease or splenic and hepatic complex cysts and Questionale lytic lesions in the sacral ala    Plan ID consult with abnormal CT imaging revealing possible metastatic disease versus infectious etiology such as tuberculosis lymphadenitis     From an oncology standpoint, do not plan to pursue tissue biopsy for diagnostic confirmation as it would not alter management . Patient with  CT imaging revealing widespread metastatic disease versus  fulminant infection in setting of advanced dementia and severe cachexia. Recommend continue Palliative care following.Recommend hospice care and comfort care.

## 2020-03-09 NOTE — ASSESSMENT & PLAN NOTE
Nutrition Problem:  Severe Protein-Calorie Malnutrition  Malnutrition in the context of Chronic Illness/Injury    Related to (etiology):  dementia    Signs and Symptoms (as evidenced by):  Energy Intake: <50% of estimated energy requirement for 1 week or greater  Body Fat Depletion: severe depletion of orbitals and triceps   Muscle Mass Depletion: severe depletion of temples and clavicle region   Weight Loss: 11% x 1.33 years   Fluid Accumulation: none noted    Interventions(treatment strategy):  Collaboration with other providers  Commercial Food/Beverage  Nutrition Supplementation    Nutrition Diagnosis Status:  New

## 2020-03-09 NOTE — ASSESSMENT & PLAN NOTE
-Folate, B12 levels normal.  Consider checking MMA and homocysteine levels  -Hepatitis panel, HIV pending  -Patient's platelets are 59k today showing response to paltelet transfusion making ITP less likely  -Would transfuse platelets for platelet count less than 10k, </=20k with petechiae or mucosal bleeding or </=50k with active bleeding or immediately prior to invasive procedures.

## 2020-03-09 NOTE — ASSESSMENT & PLAN NOTE
-Folate, B12 levels normal.    -Hepatitis panel, HIV pending  -Patient's platelets are 33k today   -Transfuse platelets for platelet count less than 10k, </=20k with petechiae or mucosal bleeding or </=50k with active bleeding or immediately prior to invasive procedures.

## 2020-03-09 NOTE — PLAN OF CARE
Problem: Malnutrition  Goal: Improved Nutritional Intake  Outcome: Ongoing, Progressing     Recommendation:   1. Continue current regular diet order as tolerated.   2. Continue to provide Boost Pudding TID and Beneprotein TID.   3. Encourage intake of meals and supplements.   4. If pt refuses Beneprotein consider addition of Boost Plus TID.     Goals:   1. Pt PO intake >/= 75% EEN/EPN daily.    Nutrition Goal Status: new  Communication of RD Recs: other (comment)(POC)

## 2020-03-09 NOTE — PROGRESS NOTES
[facility]  Hematology/Oncology  Progress Note    Patient Name: Amena Wick  Admission Date: 3/4/2020  Hospital Length of Stay: 4 days  Code Status: Full Code     Subjective:     HPI:  Pt is an 87 yo F with dementia who presents to the hospital with complaints of SOB.  On admission the patient was found to have profound anemia with hemoglobin of 6.9g/dL, thrombocytopenia with platelet count of 21k.  On laboratory work up the patient was also seen to have elevated INR, PTT, liver enzymes and AKP.  Currently the patient is without complaints but is confused at times during ROS.  The patient denies CP, SOB, abdominal pain, N/V, constipation, diarrhea.  The patient denies fever, chills, night sweats, weight loss, new lumps or bumps, easy bruising or bleeding.      Interval History: Per pt's niece the patient had a further decline in her mental status overnight. Niece states the patient has had GI upset and GERD. Pt was given PRBC's, platelets and cryoprecipitate yesterday.  The patient denies CP, SOB, abdominal pain, N/V, constipation, diarrhea.    Oncology Treatment Plan:   [No treatment plan]    Medications:  Continuous Infusions:  Scheduled Meds:   cefTRIAXone (ROCEPHIN) IVPB  1 g Intravenous Q12H    pantoprazole  40 mg Oral Daily    potassium chloride  40 mEq Oral Once     PRN Meds:sodium chloride, sodium chloride, sodium chloride, sodium chloride, sodium chloride, sodium chloride, sodium chloride 0.9%     Review of Systems   Constitutional: Negative for chills and fever.   Respiratory: Negative for cough and shortness of breath.    Cardiovascular: Negative for chest pain and palpitations.   Gastrointestinal: Negative for abdominal pain, constipation, diarrhea, nausea and vomiting.   Skin: Negative for rash.   Neurological: Negative for headaches.     Objective:     Vital Signs (Most Recent):  Temp: 99 °F (37.2 °C) (03/08/20 2030)  Pulse: 78 (03/08/20 2030)  Resp: 18 (03/08/20 1521)  BP:  124/63 (03/08/20 2030)  SpO2: 97 % (03/08/20 2030) Vital Signs (24h Range):  Temp:  [97.4 °F (36.3 °C)-99.1 °F (37.3 °C)] 99 °F (37.2 °C)  Pulse:  [69-92] 78  Resp:  [16-22] 18  SpO2:  [86 %-99 %] 97 %  BP: ()/(54-63) 124/63     Weight: 50.2 kg (110 lb 10.7 oz)  Body mass index is 20.24 kg/m².  Body surface area is 1.48 meters squared.      Intake/Output Summary (Last 24 hours) at 3/8/2020 2044  Last data filed at 3/8/2020 0354  Gross per 24 hour   Intake 277.92 ml   Output 875 ml   Net -597.08 ml       Physical Exam   Constitutional: She is oriented to person, place, and time.   Thin, cachectic   Eyes: EOM are normal. Right eye exhibits no discharge. Left eye exhibits no discharge.   Cardiovascular: Normal rate, regular rhythm and normal heart sounds. Exam reveals no gallop and no friction rub.   No murmur heard.  Pulmonary/Chest: Effort normal and breath sounds normal. No respiratory distress. She has no wheezes. She has no rales.   Abdominal: Soft. Bowel sounds are normal. She exhibits no distension. There is no tenderness. There is no rebound and no guarding.   Neurological: She is alert and oriented to person, place, and time.       Significant Labs:   Recent Results (from the past 24 hour(s))   CBC auto differential    Collection Time: 03/08/20  6:49 AM   Result Value Ref Range    WBC 7.63 3.90 - 12.70 K/uL    RBC 2.78 (L) 4.00 - 5.40 M/uL    Hemoglobin 8.1 (L) 12.0 - 16.0 g/dL    Hematocrit 25.5 (L) 37.0 - 48.5 %    Mean Corpuscular Volume 92 82 - 98 fL    Mean Corpuscular Hemoglobin 29.1 27.0 - 31.0 pg    Mean Corpuscular Hemoglobin Conc 31.8 (L) 32.0 - 36.0 g/dL    RDW 18.6 (H) 11.5 - 14.5 %    Platelets 59 (L) 150 - 350 K/uL    MPV 12.3 9.2 - 12.9 fL    Immature Granulocytes 0.8 (H) 0.0 - 0.5 %    Gran # (ANC) 5.8 1.8 - 7.7 K/uL    Immature Grans (Abs) 0.06 (H) 0.00 - 0.04 K/uL    Lymph # 0.9 (L) 1.0 - 4.8 K/uL    Mono # 0.7 0.3 - 1.0 K/uL    Eos # 0.1 0.0 - 0.5 K/uL    Baso # 0.02 0.00 - 0.20 K/uL     nRBC 1 (A) 0 /100 WBC    Gran% 76.5 (H) 38.0 - 73.0 %    Lymph% 11.4 (L) 18.0 - 48.0 %    Mono% 9.3 4.0 - 15.0 %    Eosinophil% 1.7 0.0 - 8.0 %    Basophil% 0.3 0.0 - 1.9 %    Differential Method Automated    Comprehensive metabolic panel    Collection Time: 03/08/20  6:49 AM   Result Value Ref Range    Sodium 138 136 - 145 mmol/L    Potassium 2.6 (LL) 3.5 - 5.1 mmol/L    Chloride 99 95 - 110 mmol/L    CO2 30 (H) 23 - 29 mmol/L    Glucose 114 (H) 70 - 110 mg/dL    BUN, Bld 17 8 - 23 mg/dL    Creatinine 0.8 0.5 - 1.4 mg/dL    Calcium 7.8 (L) 8.7 - 10.5 mg/dL    Total Protein 5.6 (L) 6.0 - 8.4 g/dL    Albumin 2.7 (L) 3.5 - 5.2 g/dL    Total Bilirubin 2.8 (H) 0.1 - 1.0 mg/dL    Alkaline Phosphatase 249 (H) 55 - 135 U/L    AST 65 (H) 10 - 40 U/L    ALT 53 (H) 10 - 44 U/L    Anion Gap 9 8 - 16 mmol/L    eGFR if African American >60 >60 mL/min/1.73 m^2    eGFR if non African American >60 >60 mL/min/1.73 m^2   Protime-INR    Collection Time: 03/08/20  6:49 AM   Result Value Ref Range    Prothrombin Time 13.3 (H) 9.0 - 12.5 sec    INR 1.2 0.8 - 1.2   APTT    Collection Time: 03/08/20  6:49 AM   Result Value Ref Range    aPTT 32.8 (H) 21.0 - 32.0 sec   Fibrinogen    Collection Time: 03/08/20  6:49 AM   Result Value Ref Range    Fibrinogen 115 (L) 182 - 366 mg/dL   Basic metabolic panel    Collection Time: 03/08/20  6:10 PM   Result Value Ref Range    Sodium 138 136 - 145 mmol/L    Potassium 3.4 (L) 3.5 - 5.1 mmol/L    Chloride 100 95 - 110 mmol/L    CO2 28 23 - 29 mmol/L    Glucose 150 (H) 70 - 110 mg/dL    BUN, Bld 17 8 - 23 mg/dL    Creatinine 0.8 0.5 - 1.4 mg/dL    Calcium 7.8 (L) 8.7 - 10.5 mg/dL    Anion Gap 10 8 - 16 mmol/L    eGFR if African American >60 >60 mL/min/1.73 m^2    eGFR if non African American >60 >60 mL/min/1.73 m^2         Diagnostic Results:    KUB 3/07/20     Single AP view of the abdomen  demonstrates a nonspecific bowel gas pattern. No dilated loops small bowel or air-fluid levels are detected.  There is age-indeterminate fracture of the L1 vertebral body.  Bones are osteopenic.  There is mild levoscoliosis.    CXR 3/08/20    AP portable chest radiograph demonstrates a stable cardiac silhouette within normal limits.  There is persistent obscuration of the left.  The right hemidiaphragm is partially obscured by cardiac monitoring wires, but there appears to be right basilar fluid.  Interstitial markings the aerated lung fields are prominent.  Moderate dextroscoliosis is present.  The bones are diffusely osteopenic.    Assessment/Plan:     * Symptomatic anemia  -The patient had a drop in hemoglobin since 2/22/20  -Concern is that the patient may have had anemia previously which was no evident due to hemo contraction and dehydration when assessed 2/22/20  -HAROON negative and Haptoglobin normal making BRYAN timmonsley  -Folate and B12 normal  -Transfuse to keep above 7g/dL  -Consider checking for GI blood loss. FOBT pending  -Bm biopsy discussed with the niece who does not want to proceed with one  -Given the patient's end stage dementia, it is unlikely to find any BM disorder for which treatment would be recommended  -TSH checked and was mildly decreased with normal T4  -Retic count pending     Liver dysfunction  -The patient is unlikely to have DIC given normal haptoglobin  -The patient's elevated INR/PTT, low fibrinogen and thrombocytopenia are likely from liver dysfunction  -Pt to undergo CT fo the abdomen and pelvis with contraast  -Hepatitis panel pending  -Checking Vitamin K given h/o poor po intake at home  -Factor VIII level pending  -Consider hepatology consult          Thrombocytopenia  -Folate, B12 levels normal.  Consider checking MMA and homocysteine levels  -Hepatitis panel, HIV pending  -Patient's platelets are 59k today showing response to paltelet transfusion making ITP less likely  -Would transfuse platelets for platelet count less than 10k, </=20k with petechiae or mucosal bleeding or </=50k with  active bleeding or immediately prior to invasive procedures.      Acute respiratory failure with hypoxia and hypercarbia  -The patient has acute respiratory failure with hypoxia from unknown cause  -CXR concerning for pulmonary edema  -Pt to undergo CT chest with contrast for further evaluation  -Echo in February showed diastolic dysfunction    Coagulopathy  -Pt with elevated PT/PTT  -Checking vitamin K and factor VIII level  -likely from liver dysfunction or vitamin K deficiency    Elevated liver enzymes  -Hepatitis panel pending  --Pt to undergo CT abdomen    Dementia with behavioral disturbance  -Pt with end stage dementia with decreased PO intake over the past several weeks per niece  -Sisters have dedicated the patient's niece as the POA.  -Agree with palliative care consult  -If decreased PO intake continues, would recommend hospice  -Would not consider a feeding tube.        Hematology/Oncology service will follow-up with patient. Please contact us if you have any additional questions.     Miguel Saravia MD  Hematology/Oncology  [facility] [place]

## 2020-03-09 NOTE — ASSESSMENT & PLAN NOTE
-Pt with elevated PT/PTT  -Checking vitamin K and factor VIII level  -likely from liver dysfunction or vitamin K deficiency

## 2020-03-09 NOTE — PROGRESS NOTES
[facility]  Hematology/Oncology  Progress Note    Patient Name: Amena Wick  Admission Date: 3/4/2020  Hospital Length of Stay: 5 days  Code Status: Full Code     Subjective:     HPI:  Pt is an 87 yo F with dementia who presents to the hospital with complaints of SOB.  On admission the patient was found to have profound anemia with hemoglobin of 6.9g/dL, thrombocytopenia with platelet count of 21k.  On laboratory work up the patient was also seen to have elevated INR, PTT, liver enzymes and AKP.  Currently the patient is without complaints but is confused at times during ROS.  The patient denies CP, SOB, abdominal pain, N/V, constipation, diarrhea.  The patient denies fever, chills, night sweats, weight loss, new lumps or bumps, easy bruising or bleeding.      Interval History: Pt withdrawn and not answering questions or following commands. Palliative care NP present during visit. Patient's 2 sisters at bedside.     Oncology Treatment Plan:   [No treatment plan]    Medications:  Continuous Infusions:  Scheduled Meds:   cefTRIAXone (ROCEPHIN) IVPB  1 g Intravenous Q12H    pantoprazole  40 mg Oral Daily    potassium chloride  40 mEq Oral Once     PRN Meds:sodium chloride, sodium chloride, sodium chloride, sodium chloride, sodium chloride, sodium chloride, sodium chloride 0.9%     Review of Systems   Unable to perform ROS: Dementia     Objective:     Vital Signs (Most Recent):  Temp: 98 °F (36.7 °C) (03/09/20 1122)  Pulse: 74 (03/09/20 1122)  Resp: 16 (03/09/20 1122)  BP: (!) 108/58 (03/09/20 1122)  SpO2: (!) 93 % (03/09/20 1122) Vital Signs (24h Range):  Temp:  [97.7 °F (36.5 °C)-99.1 °F (37.3 °C)] 98 °F (36.7 °C)  Pulse:  [70-88] 74  Resp:  [16-20] 16  SpO2:  [92 %-98 %] 93 %  BP: (108-128)/(54-75) 108/58     Weight: 50.2 kg (110 lb 10.7 oz)  Body mass index is 20.24 kg/m².  Body surface area is 1.48 meters squared.    No intake or output data in the 24 hours ending 03/09/20  1306    Physical Exam   Constitutional:   Thin, cachectic   HENT:   Head: Atraumatic.   Eyes: Conjunctivae and EOM are normal. Right eye exhibits no discharge. Left eye exhibits no discharge.   Cardiovascular: Normal rate, regular rhythm and normal heart sounds. Exam reveals no gallop and no friction rub.   No murmur heard.  Pulmonary/Chest: Effort normal and breath sounds normal. No respiratory distress. She has no wheezes. She has no rales.   Abdominal: Soft. Bowel sounds are normal. She exhibits no distension. There is no tenderness. There is no rebound and no guarding.   Neurological:   withdrawn   Psychiatric:   Pt withdrawn       Significant Labs:   All pertinent labs within the past 24 hours have been reviewed    Diagnostic Results:  All pertinent labs within the past 24 hours have been reviewed    CT c/a/p   1. Bilateral pleural effusions greater on the right compared to the left, associated with atelectasis or consolidations in the lung bases bilaterally.  2. Multiple well demarcated hypodense nodules in the mediastinum, likely representing cystic adenopathy.  Also in the left supraclavicular region there is a cystic appearing lesion, likely representing a necrotic nodes.  Although these could represent metastatic lymphadenopathy, in this patient with severe cachexia tuberculous adenitis can not be totally excluded.  3. Numerous hypodense nodules throughout the enlarged liver and the spleen either representing metastatic disease or splenic and hepatic complex cysts.  Ascites.  4. Right renal cyst.  5. Question lytic lesions in the sacral ala bilaterally as above.    Assessment/Plan:     * Symptomatic anemia      - Hb 7.8g/dL  --Folate and B12 normal  -Given the patient's end stage dementia, it is unlikely to find any BM disorder for which treatment would be recommended  -TSH checked and was mildly decreased with normal T4    Abnormal CT of the abdomen  CT imaging shows Multiple well demarcated hypodense  nodules in the mediastinum, likely representing cystic adenopathy,  left supraclavicular region there is a cystic appearing lesion, likely representing a necrotic nodes possibly metastatic lymphadenopathy vs tuberculous adenitis can not be totally excluded. In addition,  Numerous hypodense nodules throughout the enlarged liver and the spleen either representing metastatic disease or splenic and hepatic complex cysts and Questionale lytic lesions in the sacral ala    Plan ID consult with abnormal CT imaging revealing possible metastatic disease versus infectious etiology such as tuberculosis lymphadenitis     From an oncology standpoint, do not plan to pursue tissue biopsy for diagnostic confirmation as it would not alter management . Patient with  CT imaging revealing widespread metastatic disease versus  fulminant infection in setting of advanced dementia and severe cachexia. Recommend continue Palliative care following.Recommend hospice care and comfort care.        Acute respiratory failure with hypoxia and hypercarbia  -The patient has acute respiratory failure with hypoxia from unknown cause  -CXR concerning for pulmonary edema  -Echo in February showed diastolic dysfunction    Liver dysfunction  -The patient is unlikely to have DIC given normal haptoglobin  -The patient's elevated INR/PTT, low fibrinogen and thrombocytopenia are likely from liver dysfunction      Coagulopathy  -Pt with elevated PT/PTT  -vitamin K pending  - factor VIII level wnl  -likely from liver dysfunction or vitamin K deficiency      Thrombocytopenia  -Folate, B12 levels normal.    -Hepatitis panel, HIV pending  -Patient's platelets are 33k today   -Transfuse platelets for platelet count less than 10k, </=20k with petechiae or mucosal bleeding or </=50k with active bleeding or immediately prior to invasive procedures.      Dementia with behavioral disturbance  -Psychiatry following, It has been determined pt has an advanced or end stage  dementia which has a poor prognosis  -Palliative care following and goals of care and prognosis to be d/w niece. Sisters defer decision medical  making to niece.           Makenzie Mckeon MD  Hematology/Oncology  [facility]

## 2020-03-09 NOTE — ASSESSMENT & PLAN NOTE
-Psychiatry following, It has been determined pt has an advanced or end stage dementia which has a poor prognosis  -Palliative care following and goals of care and prognosis to be d/w niece. Sisters defer decision medical  making to niece.      08-Oct-2018 08:17

## 2020-03-09 NOTE — ASSESSMENT & PLAN NOTE
-The patient had a drop in hemoglobin since 2/22/20  -Concern is that the patient may have had anemia previously which was no evident due to hemo contraction and dehydration when assessed 2/22/20  -HAROON negative and Haptoglobin normal making BRYAN noe  -Folate and B12 normal  -Transfuse to keep above 7g/dL  -Consider checking for GI blood loss. FOBT pending  -Bm biopsy discussed with the niece who does not want to proceed with one  -Given the patient's end stage dementia, it is unlikely to find any BM disorder for which treatment would be recommended  -TSH checked and was mildly decreased with normal T4  -Retic count pending

## 2020-03-09 NOTE — NURSING
Patient is irritated this morning. She do not want to be touch and cursing the staff. She also removing her Venti mask. Tried to calm her down, she is settled in bed put her on oxygen 4Lvia nasal cannula, saturating to 93-94%. Will continue to monitor.

## 2020-03-09 NOTE — SUBJECTIVE & OBJECTIVE
Interval History: Per pt's niece the patient had a further decline in her mental status overnight. Niece states the patient has had GI upset and GERD. Pt was given PRBC's, platelets and cryoprecipitate yesterday.  The patient denies CP, SOB, abdominal pain, N/V, constipation, diarrhea.    Oncology Treatment Plan:   [No treatment plan]    Medications:  Continuous Infusions:  Scheduled Meds:   cefTRIAXone (ROCEPHIN) IVPB  1 g Intravenous Q12H    pantoprazole  40 mg Oral Daily    potassium chloride  40 mEq Oral Once     PRN Meds:sodium chloride, sodium chloride, sodium chloride, sodium chloride, sodium chloride, sodium chloride, sodium chloride 0.9%     Review of Systems   Constitutional: Negative for chills and fever.   Respiratory: Negative for cough and shortness of breath.    Cardiovascular: Negative for chest pain and palpitations.   Gastrointestinal: Negative for abdominal pain, constipation, diarrhea, nausea and vomiting.   Skin: Negative for rash.   Neurological: Negative for headaches.     Objective:     Vital Signs (Most Recent):  Temp: 99 °F (37.2 °C) (03/08/20 2030)  Pulse: 78 (03/08/20 2030)  Resp: 18 (03/08/20 1521)  BP: 124/63 (03/08/20 2030)  SpO2: 97 % (03/08/20 2030) Vital Signs (24h Range):  Temp:  [97.4 °F (36.3 °C)-99.1 °F (37.3 °C)] 99 °F (37.2 °C)  Pulse:  [69-92] 78  Resp:  [16-22] 18  SpO2:  [86 %-99 %] 97 %  BP: ()/(54-63) 124/63     Weight: 50.2 kg (110 lb 10.7 oz)  Body mass index is 20.24 kg/m².  Body surface area is 1.48 meters squared.      Intake/Output Summary (Last 24 hours) at 3/8/2020 2044  Last data filed at 3/8/2020 0354  Gross per 24 hour   Intake 277.92 ml   Output 875 ml   Net -597.08 ml       Physical Exam   Constitutional: She is oriented to person, place, and time.   Thin, cachectic   Eyes: EOM are normal. Right eye exhibits no discharge. Left eye exhibits no discharge.   Cardiovascular: Normal rate, regular rhythm and normal heart sounds. Exam reveals no gallop and  no friction rub.   No murmur heard.  Pulmonary/Chest: Effort normal and breath sounds normal. No respiratory distress. She has no wheezes. She has no rales.   Abdominal: Soft. Bowel sounds are normal. She exhibits no distension. There is no tenderness. There is no rebound and no guarding.   Neurological: She is alert and oriented to person, place, and time.       Significant Labs:   Recent Results (from the past 24 hour(s))   CBC auto differential    Collection Time: 03/08/20  6:49 AM   Result Value Ref Range    WBC 7.63 3.90 - 12.70 K/uL    RBC 2.78 (L) 4.00 - 5.40 M/uL    Hemoglobin 8.1 (L) 12.0 - 16.0 g/dL    Hematocrit 25.5 (L) 37.0 - 48.5 %    Mean Corpuscular Volume 92 82 - 98 fL    Mean Corpuscular Hemoglobin 29.1 27.0 - 31.0 pg    Mean Corpuscular Hemoglobin Conc 31.8 (L) 32.0 - 36.0 g/dL    RDW 18.6 (H) 11.5 - 14.5 %    Platelets 59 (L) 150 - 350 K/uL    MPV 12.3 9.2 - 12.9 fL    Immature Granulocytes 0.8 (H) 0.0 - 0.5 %    Gran # (ANC) 5.8 1.8 - 7.7 K/uL    Immature Grans (Abs) 0.06 (H) 0.00 - 0.04 K/uL    Lymph # 0.9 (L) 1.0 - 4.8 K/uL    Mono # 0.7 0.3 - 1.0 K/uL    Eos # 0.1 0.0 - 0.5 K/uL    Baso # 0.02 0.00 - 0.20 K/uL    nRBC 1 (A) 0 /100 WBC    Gran% 76.5 (H) 38.0 - 73.0 %    Lymph% 11.4 (L) 18.0 - 48.0 %    Mono% 9.3 4.0 - 15.0 %    Eosinophil% 1.7 0.0 - 8.0 %    Basophil% 0.3 0.0 - 1.9 %    Differential Method Automated    Comprehensive metabolic panel    Collection Time: 03/08/20  6:49 AM   Result Value Ref Range    Sodium 138 136 - 145 mmol/L    Potassium 2.6 (LL) 3.5 - 5.1 mmol/L    Chloride 99 95 - 110 mmol/L    CO2 30 (H) 23 - 29 mmol/L    Glucose 114 (H) 70 - 110 mg/dL    BUN, Bld 17 8 - 23 mg/dL    Creatinine 0.8 0.5 - 1.4 mg/dL    Calcium 7.8 (L) 8.7 - 10.5 mg/dL    Total Protein 5.6 (L) 6.0 - 8.4 g/dL    Albumin 2.7 (L) 3.5 - 5.2 g/dL    Total Bilirubin 2.8 (H) 0.1 - 1.0 mg/dL    Alkaline Phosphatase 249 (H) 55 - 135 U/L    AST 65 (H) 10 - 40 U/L    ALT 53 (H) 10 - 44 U/L    Anion Gap 9 8  - 16 mmol/L    eGFR if African American >60 >60 mL/min/1.73 m^2    eGFR if non African American >60 >60 mL/min/1.73 m^2   Protime-INR    Collection Time: 03/08/20  6:49 AM   Result Value Ref Range    Prothrombin Time 13.3 (H) 9.0 - 12.5 sec    INR 1.2 0.8 - 1.2   APTT    Collection Time: 03/08/20  6:49 AM   Result Value Ref Range    aPTT 32.8 (H) 21.0 - 32.0 sec   Fibrinogen    Collection Time: 03/08/20  6:49 AM   Result Value Ref Range    Fibrinogen 115 (L) 182 - 366 mg/dL   Basic metabolic panel    Collection Time: 03/08/20  6:10 PM   Result Value Ref Range    Sodium 138 136 - 145 mmol/L    Potassium 3.4 (L) 3.5 - 5.1 mmol/L    Chloride 100 95 - 110 mmol/L    CO2 28 23 - 29 mmol/L    Glucose 150 (H) 70 - 110 mg/dL    BUN, Bld 17 8 - 23 mg/dL    Creatinine 0.8 0.5 - 1.4 mg/dL    Calcium 7.8 (L) 8.7 - 10.5 mg/dL    Anion Gap 10 8 - 16 mmol/L    eGFR if African American >60 >60 mL/min/1.73 m^2    eGFR if non African American >60 >60 mL/min/1.73 m^2         Diagnostic Results:    KUB 3/07/20     Single AP view of the abdomen  demonstrates a nonspecific bowel gas pattern. No dilated loops small bowel or air-fluid levels are detected. There is age-indeterminate fracture of the L1 vertebral body.  Bones are osteopenic.  There is mild levoscoliosis.    CXR 3/08/20    AP portable chest radiograph demonstrates a stable cardiac silhouette within normal limits.  There is persistent obscuration of the left.  The right hemidiaphragm is partially obscured by cardiac monitoring wires, but there appears to be right basilar fluid.  Interstitial markings the aerated lung fields are prominent.  Moderate dextroscoliosis is present.  The bones are diffusely osteopenic.

## 2020-03-09 NOTE — ASSESSMENT & PLAN NOTE
-Pt with elevated PT/PTT  -vitamin K pending  - factor VIII level wnl  -likely from liver dysfunction or vitamin K deficiency

## 2020-03-10 LAB
ANA SER QL IF: POSITIVE
ANA TITR SER IF: NORMAL {TITER}

## 2020-03-10 PROCEDURE — 99233 PR SUBSEQUENT HOSPITAL CARE,LEVL III: ICD-10-PCS | Mod: ,,, | Performed by: NURSE PRACTITIONER

## 2020-03-10 PROCEDURE — 94761 N-INVAS EAR/PLS OXIMETRY MLT: CPT

## 2020-03-10 PROCEDURE — 99233 SBSQ HOSP IP/OBS HIGH 50: CPT | Mod: ,,, | Performed by: NURSE PRACTITIONER

## 2020-03-10 PROCEDURE — 21400001 HC TELEMETRY ROOM

## 2020-03-10 PROCEDURE — 63600175 PHARM REV CODE 636 W HCPCS: Performed by: INTERNAL MEDICINE

## 2020-03-10 PROCEDURE — 27000221 HC OXYGEN, UP TO 24 HOURS

## 2020-03-10 RX ADMIN — CEFTRIAXONE 1 G: 1 INJECTION, SOLUTION INTRAVENOUS at 10:03

## 2020-03-10 NOTE — SUBJECTIVE & OBJECTIVE
Interval History: Pt is asleep, awakend, opening eyes spontaneously.       Review of Systems   Unable to perform ROS: Mental status change   Constitutional: Negative for activity change and fatigue.   HENT: Negative.    Eyes: Negative.    Respiratory: Negative.    Endocrine: Negative.    Genitourinary: Negative.    Neurological: Negative for facial asymmetry, speech difficulty and weakness.   Psychiatric/Behavioral: Negative for behavioral problems and decreased concentration.     Objective:     Vital Signs (Most Recent):  Temp: 96.9 °F (36.1 °C) (03/09/20 1510)  Pulse: 84 (03/09/20 1510)  Resp: 16 (03/09/20 1510)  BP: (!) 112/58 (03/09/20 1510)  SpO2: 95 % (03/09/20 1510) Vital Signs (24h Range):  Temp:  [96.9 °F (36.1 °C)-99 °F (37.2 °C)] 96.9 °F (36.1 °C)  Pulse:  [70-88] 84  Resp:  [16-20] 16  SpO2:  [92 %-98 %] 95 %  BP: (108-128)/(54-75) 112/58     Weight: 50.2 kg (110 lb 10.7 oz)  Body mass index is 20.24 kg/m².  No intake or output data in the 24 hours ending 03/09/20 1957   Physical Exam   Constitutional:   Malnourished   HENT:   Head: Normocephalic and atraumatic.   Left Ear: External ear normal.   Nose: Nose normal.   Eyes: Pupils are equal, round, and reactive to light. Conjunctivae and EOM are normal.   Neck: Normal range of motion. Neck supple. No JVD present. No thyromegaly present.   Cardiovascular: Normal rate, regular rhythm and normal heart sounds.   Pulmonary/Chest: Effort normal and breath sounds normal.   Abdominal: Soft. Bowel sounds are normal. She exhibits no distension and no mass. There is no guarding. No hernia.   Musculoskeletal: Normal range of motion. She exhibits no edema, tenderness or deformity.   Lymphadenopathy:     She has no cervical adenopathy.   Neurological: She is alert.   Advanced dementia  Sleepy, withdrwan on awakening     Psychiatric:   Advanced dementia  Withdrawn today   More sleepy    Nursing note and vitals reviewed.      Significant Labs:   BMP:   Recent Labs   Lab  03/09/20  0336   *      K 4.2      CO2 29   BUN 17   CREATININE 0.8   CALCIUM 7.7*     CBC:   Recent Labs   Lab 03/08/20  0649 03/09/20 0336   WBC 7.63 7.03   HGB 8.1* 7.8*   HCT 25.5* 25.0*   PLT 59* 33*       Significant Imaging: I have reviewed all pertinent imaging results/findings within the past 24 hours.

## 2020-03-10 NOTE — SUBJECTIVE & OBJECTIVE
Interval History: pt has no new complaints     Review of Systems   Unable to perform ROS: Mental status change   Constitutional: Negative for activity change and fatigue.   HENT: Negative.    Eyes: Negative.    Respiratory: Negative.    Endocrine: Negative.    Genitourinary: Negative.    Neurological: Negative for facial asymmetry, speech difficulty and weakness.   Psychiatric/Behavioral: Negative for behavioral problems and decreased concentration.     Objective:     Vital Signs (Most Recent):  Temp: 98.2 °F (36.8 °C) (03/10/20 1615)  Pulse: 81 (03/10/20 1615)  Resp: 16 (03/10/20 1615)  BP: (!) 119/58 (03/10/20 1615)  SpO2: 96 % (03/10/20 1615) Vital Signs (24h Range):  Temp:  [97.3 °F (36.3 °C)-98.6 °F (37 °C)] 98.2 °F (36.8 °C)  Pulse:  [75-97] 81  Resp:  [16-18] 16  SpO2:  [90 %-96 %] 96 %  BP: (116-132)/(58-71) 119/58     Weight: 52.1 kg (114 lb 13.8 oz)  Body mass index is 21.01 kg/m².    Intake/Output Summary (Last 24 hours) at 3/10/2020 1640  Last data filed at 3/10/2020 1033  Gross per 24 hour   Intake 20 ml   Output --   Net 20 ml      Physical Exam   Constitutional:   Malnourished   HENT:   Head: Normocephalic and atraumatic.   Left Ear: External ear normal.   Nose: Nose normal.   Eyes: Pupils are equal, round, and reactive to light. Conjunctivae and EOM are normal.   Neck: Normal range of motion. Neck supple. No JVD present. No thyromegaly present.   Cardiovascular: Normal rate, regular rhythm and normal heart sounds.   Pulmonary/Chest: Effort normal and breath sounds normal.   Abdominal: Soft. Bowel sounds are normal. She exhibits no distension and no mass. There is no guarding. No hernia.   Musculoskeletal: Normal range of motion. She exhibits no edema, tenderness or deformity.   Lymphadenopathy:     She has no cervical adenopathy.   Neurological: She is alert.   Advanced dementia  Sleepy, withdrwan on awakening     Psychiatric:   Advanced dementia  Withdrawn today   More sleepy    Nursing note and  vitals reviewed.      Significant Labs: All pertinent labs within the past 24 hours have been reviewed.    Significant Imaging: I have reviewed and interpreted all pertinent imaging results/findings within the past 24 hours.

## 2020-03-10 NOTE — NURSING
Report received from STACIA Singh. Patient resting comfortably in bed, no acute distress noted. She is on oxygen at 4 L via nasal cannula. Family at bedside.Side rails up x2, bed alarm set, call light in reach, non skid socks in use.Peripheral IV site, no redness or swelling noted.  Will continue to monitor.

## 2020-03-10 NOTE — PLAN OF CARE
89 yo F with dementia admitted with SOB/fatigue. Found to have low o2 sat 88%. CT scan showed areas c/f metastatic lymphadenopathy. Denies cough or prior h/o TB or contacts.palliative care and heme-onc following. ID c/f possible tuberculous adenitis.  Easiest way to exclude TB would be AFB x3.  Per palliative care notes , pt now dnr w/ plans for hospice tomorrow.  In light of goals of care, ID will sign off.  Please re-consult if goals of care change and desire to pursue TB workup.

## 2020-03-10 NOTE — PROGRESS NOTES
"[facility] Medicine  Progress Note    Patient Name: Amena Wick  MRN: [id number]  Patient Class: IP- Inpatient   Admission Date: 3/4/2020  Length of Stay: 6 days  Attending Physician: Eva Marte MD  Primary Care Provider: Thai Her MD        Subjective:     Principal Problem:Symptomatic anemia        HPI:  Amena Nash is a 88 YOCF with PMH of dementia who presented to the ED via EMS accompanied by her niece c/o pallor, fatigue and SOB when the pt woke up. . Accordign to the history obtained in the ER , the pt's niece reported several sick contacts at home. Per EMS, the pt presented with an O2 sat of 88% on RA, which improved to 92% on 2L. The pt denies fever, rhinorrhea, abdominal pain and chest pain. She does not use supplemental O2 at home. She does not smoke cigarettes, consume EtOH or use illicit drugs. Further hx is limited due to dementia. No prior tx.      At the time of my examination the pt reported no symptoms, provided the above account and reported that her niece noticed unusual color/pallor on my face  She denied any SOB, palptitations but has recently noticed increased weakness.        Review of patient's allergies indicates:  No Known Allergies    Overview/Hospital Course:  S/P One U of PRBC with subsequent improvement in HnH to 8 from 6.9.     3/5/2020  Improved hemoglobin  Continue to have thrombocytopenia  Niece is on the bed side and although is not POA, refused to be treated for suspected blood dyscrasiaS.  The niece also suspects "Vampires" after her.   Continue to have shortness of breath on minimal exertion    3/5/2020  Baseline dementia  Concerns for DIC, liver disease   Ordered ID work up and liver US     3/6/2020  Seen by Heme/Onc; Suspect DIC,ordered work up   Niece not in agreement for more blood work  The pt has a sister who recommends all her care gone through the niece who is a retired nurse.     3/7/2020  Continue to have " oxygen requirement at 2 LPM   Continue on ASA and Lasix (For pulmonary congestion)  3/8/2020  The pt is demented  Sleepy this AM  Spoke with the family member   They want to do investigations and treat her  GI consulted  CT ABD/PELVIS WIth contrast to order   End stage dementia    3/9/2020  Pt seen this AM   No family members on bed side  The pt waking up with verbal stimuli   Withdrawn   The CT Chest/abd/pelvis with cystic lesions in the mediastinal lymph nodes, liver and spleen    3/10- pt and family met with palliative care today- pt is DNR.  Dc home tomorrow with home hospice/passages       Interval History: pt has no new complaints     Review of Systems   Unable to perform ROS: Mental status change   Constitutional: Negative for activity change and fatigue.   HENT: Negative.    Eyes: Negative.    Respiratory: Negative.    Endocrine: Negative.    Genitourinary: Negative.    Neurological: Negative for facial asymmetry, speech difficulty and weakness.   Psychiatric/Behavioral: Negative for behavioral problems and decreased concentration.     Objective:     Vital Signs (Most Recent):  Temp: 98.2 °F (36.8 °C) (03/10/20 1615)  Pulse: 81 (03/10/20 1615)  Resp: 16 (03/10/20 1615)  BP: (!) 119/58 (03/10/20 1615)  SpO2: 96 % (03/10/20 1615) Vital Signs (24h Range):  Temp:  [97.3 °F (36.3 °C)-98.6 °F (37 °C)] 98.2 °F (36.8 °C)  Pulse:  [75-97] 81  Resp:  [16-18] 16  SpO2:  [90 %-96 %] 96 %  BP: (116-132)/(58-71) 119/58     Weight: 52.1 kg (114 lb 13.8 oz)  Body mass index is 21.01 kg/m².    Intake/Output Summary (Last 24 hours) at 3/10/2020 1640  Last data filed at 3/10/2020 1033  Gross per 24 hour   Intake 20 ml   Output --   Net 20 ml      Physical Exam   Constitutional:   Malnourished   HENT:   Head: Normocephalic and atraumatic.   Left Ear: External ear normal.   Nose: Nose normal.   Eyes: Pupils are equal, round, and reactive to light. Conjunctivae and EOM are normal.   Neck: Normal range of motion. Neck supple. No  "JVD present. No thyromegaly present.   Cardiovascular: Normal rate, regular rhythm and normal heart sounds.   Pulmonary/Chest: Effort normal and breath sounds normal.   Abdominal: Soft. Bowel sounds are normal. She exhibits no distension and no mass. There is no guarding. No hernia.   Musculoskeletal: Normal range of motion. She exhibits no edema, tenderness or deformity.   Lymphadenopathy:     She has no cervical adenopathy.   Neurological: She is alert.   Advanced dementia  Sleepy, withdrwan on awakening     Psychiatric:   Advanced dementia  Withdrawn today   More sleepy    Nursing note and vitals reviewed.      Significant Labs: All pertinent labs within the past 24 hours have been reviewed.    Significant Imaging: I have reviewed and interpreted all pertinent imaging results/findings within the past 24 hours.      Assessment/Plan:      * Symptomatic anemia    -S/P transfusion x 1 Iin the ED, and one ON 3/5/2020- Improved HnH but then dropped  -Suspect chronic slow ooze from GI  vs DIC vs malnutrition with generalized vitamin deficiency, including Vit K   -Stool for occult blood  -Follow up on the HnH, if continue to loose blood, will need to transfuse  -GI consulted, recommend US live, ordered already.       3/9/2020  Suspect loss, or sequestration   CT chest /ab pelvis WITH extensive abnormal findings   Mediastinal adenopathies with cystic lesiosn  Cystic liver and spleen lesions  ". Numerous hypodense nodules throughout the enlarged liver and the spleen either representing metastatic disease or splenic and hepatic complex cysts.  Ascites.  4. Right renal cyst.  5. Question lytic lesions in the sacral ala bilaterally as above."     Heme/Onc and GI both on board, GI does not recommend endoscopy considering improperly addressed goals of care.   Heme/Onc recommended palliative and hospice care.   "From an oncology standpoint, do not plan to pursue tissue biopsy for diagnostic confirmation as it would not alter " "management . Patient with  CT imaging revealing widespread metastatic disease versus  fulminant infection in setting of advanced dementia and severe cachexia. Recommend continue Palliative care following.Recommend hospice care and comfort care.  "     Liver dysfunction  -UA positive for leucocytosis and many bacteria  -Follow the blood cultures  -Started on ceftriaxone today   -However UTI as the cause is unlikely  -Elevated INR, Low Fibrinogen level ; suspect for manifestation of generalized malnutrition.       Coagulopathy  Suspect nutritional def including Vit K       Elevated liver enzymes  -Will check the viral panel.   -Also will do liver US; niece refuse further testing   -Elevated DIC markers could be in the setting of liver injury,or general nutritional def.           Thrombocytopenia  -S/P Transfusion of platelets in the setting of bruising and suspected mucosal bleeding  -Platelets still low, with drop in HnH despite two units of RBC transfusion, suspect a slow ooze,   -Follow up on the platelets level on daily level and transfuse as needed   -Continue to follow,will transfuse to keep at appropriate level as per Heme/onc recommendations.         Palliative care encounter  Concerns for goals of care, POA  Continue to transfuse RBC and platelets when needed  Will complete the work up to find the find the final diagnosis for her symptomatic anemia and thrombocytopenia.       Dementia with behavioral disturbance  - Stable, chronic  -Continue Delirium precautions.   -Follow psych eval, appreciate psych eval. Evidence of UTI exists, will start on Ceftriaxone, and will follow the clinical status.   -The niece is concerned about the hemoglobin, and thinks "Vampires" are possibly collecting her blood.       3/7/2020  Stable, no evidence of psychomotor agitation  Continue to be disoriented    3/8/2020  Stable   3/9/2020  More drowsy, more withdrawn   Did not eat much yesterday           VTE Risk Mitigation (From " admission, onward)         Ordered     IP VTE HIGH RISK PATIENT  Once      03/04/20 1811     Place sequential compression device  Until discontinued      03/04/20 1811     Place MERLY hose  Until discontinued      03/04/20 1811                      Eva Marte MD  Department of Hospital Medicine   [facility]

## 2020-03-10 NOTE — PROGRESS NOTES
Received report from STACIA Erwin., patient alert disorientedx3, oriented to person, resting quietly in bed w/ family at bedside, 2L O2 NC & telemetry monitoring in place, no distress noted. Safety maintained- Airborne precautions initiated, call light in reach.

## 2020-03-10 NOTE — CONSULTS
[facility]  Infectious Disease  Consult Note    Patient Name: Amena Wick  MRN: [id number]  Admission Date: 3/4/2020  Hospital Length of Stay: 6 days  Attending Physician: Eva Marte MD  Primary Care Provider: Thai Her MD     Isolation Status: Airborne    Patient information was obtained from patient and ER records.      Inpatient consult to Infectious Diseases  Consult performed by: Salma Silverio MD  Consult ordered by: Orquidea Garcia MD        Assessment/Plan:     Lymphadenopathy  89F with dementia admitted with SOB/fatigue. Found to have low o2 sat 88%. CT scan showed areas c/f metastatic lymphadenopathy. Denies cough or prior h/o TB or contacts.palliative care and heme-onc following. 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 Consult resp for sputum  Induction. If negative, consider biopsy for path/culture if c/w goals of care. Would consider stopping ceftriaxone.          Thank you for your consult. I will follow-up with patient. Please contact us if you have any additional questions.    Salma Silverio MD  Infectious Disease  [facility]    Subjective:     Principal Problem: Symptomatic anemia    HPI: 89F with dementia admitted with SOB/fatigue. Found to have low o2 sat 88%. CT scan showed areas c/f metastatic lymphadenopathy. Pt denies fever. Has had some weight loss. Denies chronic dough. Denies TB contacts. Denies prior h/o tb. Denies complaints and asks to be left alone to rest. ID c/f need for tb work up. Has been on ceftriaxone x4 d. Afebrile.            Review of Systems   Unable to perform ROS: Mental status change   Constitutional: Negative for activity change and fatigue.   HENT: Negative.    Eyes: Negative.    Respiratory: Negative.    Endocrine: Negative.    Genitourinary: Negative.    Neurological: Negative for facial asymmetry, speech difficulty and weakness.    Psychiatric/Behavioral: Negative for behavioral problems and decreased concentration.     Objective:     Vital Signs (Most Recent):  Temp: 98.6 °F (37 °C) (03/10/20 0523)  Pulse: 75 (03/10/20 0523)  Resp: 18 (03/10/20 0523)  BP: 128/60 (03/10/20 0523)  SpO2: (!) 92 % (03/10/20 0523) Vital Signs (24h Range):  Temp:  [96.9 °F (36.1 °C)-98.6 °F (37 °C)] 98.6 °F (37 °C)  Pulse:  [74-91] 75  Resp:  [16-18] 18  SpO2:  [92 %-95 %] 92 %  BP: (108-128)/(58-75) 128/60     Weight: 52.1 kg (114 lb 13.8 oz)  Body mass index is 21.01 kg/m².  No intake or output data in the 24 hours ending 03/10/20 0740   Physical Exam   Constitutional:   Malnourished   HENT:   Head: Normocephalic and atraumatic.   Left Ear: External ear normal.   Nose: Nose normal.   Eyes: Pupils are equal, round, and reactive to light. Conjunctivae and EOM are normal.   Neck: Normal range of motion. Neck supple. No JVD present. No thyromegaly present.   Cardiovascular: Normal rate, regular rhythm and normal heart sounds.   Pulmonary/Chest: Effort normal and breath sounds normal.   Abdominal: Soft. Bowel sounds are normal. She exhibits no distension and no mass. There is tenderness. There is no guarding. No hernia.   Abdomen tneder to palpation   Musculoskeletal: Normal range of motion. She exhibits no edema, tenderness or deformity.   Lymphadenopathy:     She has no cervical adenopathy.   Neurological: She is alert.   Advanced dementia  Sleepy, withdrwan on awakening     Psychiatric:   Advanced dementia  Withdrawn today   More sleepy    Nursing note and vitals reviewed.      Significant Labs:   BMP:   Recent Labs   Lab 03/09/20  0336   *      K 4.2      CO2 29   BUN 17   CREATININE 0.8   CALCIUM 7.7*     CBC:   Recent Labs   Lab 03/09/20  0336   WBC 7.03   HGB 7.8*   HCT 25.0*   PLT 33*       Significant Imaging: I have reviewed all pertinent imaging results/findings within the past 24 hours.

## 2020-03-10 NOTE — PROGRESS NOTES
Pt followed by Palliative care for advance care planning. Dtr has agreed to hospice enrollment and DNR status. No more transfusions planned. Continue supportive care. Will sign off.       Makenzie Mckeon MD  Hematology/Oncology  [facility]   [phone]

## 2020-03-10 NOTE — SUBJECTIVE & OBJECTIVE
Review of Systems   Unable to perform ROS: Mental status change   Constitutional: Negative for activity change and fatigue.   HENT: Negative.    Eyes: Negative.    Respiratory: Negative.    Endocrine: Negative.    Genitourinary: Negative.    Neurological: Negative for facial asymmetry, speech difficulty and weakness.   Psychiatric/Behavioral: Negative for behavioral problems and decreased concentration.     Objective:     Vital Signs (Most Recent):  Temp: 98.6 °F (37 °C) (03/10/20 0523)  Pulse: 75 (03/10/20 0523)  Resp: 18 (03/10/20 0523)  BP: 128/60 (03/10/20 0523)  SpO2: (!) 92 % (03/10/20 0523) Vital Signs (24h Range):  Temp:  [96.9 °F (36.1 °C)-98.6 °F (37 °C)] 98.6 °F (37 °C)  Pulse:  [74-91] 75  Resp:  [16-18] 18  SpO2:  [92 %-95 %] 92 %  BP: (108-128)/(58-75) 128/60     Weight: 52.1 kg (114 lb 13.8 oz)  Body mass index is 21.01 kg/m².  No intake or output data in the 24 hours ending 03/10/20 0740   Physical Exam   Constitutional:   Malnourished   HENT:   Head: Normocephalic and atraumatic.   Left Ear: External ear normal.   Nose: Nose normal.   Eyes: Pupils are equal, round, and reactive to light. Conjunctivae and EOM are normal.   Neck: Normal range of motion. Neck supple. No JVD present. No thyromegaly present.   Cardiovascular: Normal rate, regular rhythm and normal heart sounds.   Pulmonary/Chest: Effort normal and breath sounds normal.   Abdominal: Soft. Bowel sounds are normal. She exhibits no distension and no mass. There is tenderness. There is no guarding. No hernia.   Abdomen tneder to palpation   Musculoskeletal: Normal range of motion. She exhibits no edema, tenderness or deformity.   Lymphadenopathy:     She has no cervical adenopathy.   Neurological: She is alert.   Advanced dementia  Sleepy, withdrwan on awakening     Psychiatric:   Advanced dementia  Withdrawn today   More sleepy    Nursing note and vitals reviewed.      Significant Labs:   BMP:   Recent Labs   Lab 03/09/20  0336   GLU  117*      K 4.2      CO2 29   BUN 17   CREATININE 0.8   CALCIUM 7.7*     CBC:   Recent Labs   Lab 03/09/20  0336   WBC 7.03   HGB 7.8*   HCT 25.0*   PLT 33*       Significant Imaging: I have reviewed all pertinent imaging results/findings within the past 24 hours.

## 2020-03-10 NOTE — HPI
89F with dementia admitted with SOB/fatigue. Found to have low o2 sat 88%. CT scan showed areas c/f metastatic lymphadenopathy. Pt denies fever. Has had some weight loss. Denies chronic dough. Denies TB contacts. Denies prior h/o tb. Denies complaints and asks to be left alone to rest. ID c/f need for tb work up. Has been on ceftriaxone x4 d. Afebrile.

## 2020-03-10 NOTE — ASSESSMENT & PLAN NOTE
"- Stable, chronic  -Continue Delirium precautions.   -Follow psych eval, appreciate psych eval. Evidence of UTI exists, will start on Ceftriaxone, and will follow the clinical status.   -The niece is concerned about the hemoglobin, and thinks "Vampires" are possibly collecting her blood.       3/7/2020  Stable, no evidence of psychomotor agitation  Continue to be disoriented    3/8/2020  Stable   3/9/2020  More drowsy, more withdrawn   Did not eat much yesterday       "

## 2020-03-10 NOTE — PROGRESS NOTES
Patient sleeping and will open eyes with tactile stimuli. She is nonverbal and will not follow commands. Vannesa Leavitt who is designated decision maker by family ia at bedside.     Advance Care Planning     [facility]  I engaged Dagmar in a conversation about advance care planning and we specifically addressed what the goals of care would be moving forward, in light of the patient's change in clinical status, CT results, advanced dementia, age, and symptoms related to presumptive wide spread metastatic cancer. We did specifically address the patient's likely prognosis, which is poor and could be as little as days to weeks given her significant decline. Dagmar reports family suspected cancer and discussed last night what is most important at this point which is to keep her comfortable.   We explored the patient's values and preferences for future care. The family do not want to pursue any more testing or procedures. The family endorses that what is most important right now is to focus on quality of life, even if it means sacrificing a little time and comfort care.  Accordingly, we have decided that the best plan to meet the patient's goals includes discontinuing treatment and enrolling in hospice.  We discussed hospice and I did explain the role for hospice care at this stage of the patient's illness, including its ability to help the patient live with the best quality of life possible. 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 We also discussed inpatient hospice as an option due to patient meets criteria for symptom management, and with no more transfusions most likely will have increased symptoms of SOB and restlessness, agitation.      Code Status  We  also discussed CPR in detail pros and cons  And addressed the patient's code status again given the information we now have and what is recommended by the medical team. The family agree now with DNR and want a peaceful death for the patient. Along those lines, the patient does not wish to have CPR or other invasive treatments performed when her heart and/or breathing stops. I communicated to the family that a DNR order would be placed in her medical record to reflect this preference and will complete a LaPOST   Addressed all questions and concerns to Dagmar's satisfaction. Emotional support provided. Updated Dr Marte      -plan is hospice with planned discharge for tomorrow per family request  -patient is now a DNR (Dr Marte notified to change order)  -Will need LaPOST prior to discharge  -Palliative to continue to follow for supportive care       > 50% of 35 min visit spent in chart review, face to face discussion of goals of care,  symptom assessment, coordination of care and emotional support.

## 2020-03-10 NOTE — ASSESSMENT & PLAN NOTE
89F with dementia admitted with SOB/fatigue. Found to have low o2 sat 88%. CT scan showed areas c/f metastatic lymphadenopathy. Denies cough or prior h/o TB or contacts.palliative care and heme-onc following. 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 Consult resp for sputum  Induction. If negative, consider biopsy for path/culture if c/w goals of care. Would consider stopping ceftriaxone.

## 2020-03-10 NOTE — ASSESSMENT & PLAN NOTE
"  -S/P transfusion x 1 Iin the ED, and one ON 3/5/2020- Improved HnH but then dropped  -Suspect chronic slow ooze from GI  vs DIC vs malnutrition with generalized vitamin deficiency, including Vit K   -Stool for occult blood  -Follow up on the HnH, if continue to loose blood, will need to transfuse  -GI consulted, recommend US live, ordered already.       3/9/2020  Suspect loss, or sequestration   CT chest /ab pelvis WITH extensive abnormal findings   Mediastinal adenopathies with cystic lesiosn  Cystic liver and spleen lesions  ". Numerous hypodense nodules throughout the enlarged liver and the spleen either representing metastatic disease or splenic and hepatic complex cysts.  Ascites.  4. Right renal cyst.  5. Question lytic lesions in the sacral ala bilaterally as above."     Heme/Onc and GI both on board, GI does not recommend endoscopy considering improperly addressed goals of care.   Heme/Onc recommended palliative and hospice care.   "From an oncology standpoint, do not plan to pursue tissue biopsy for diagnostic confirmation as it would not alter management . Patient with  CT imaging revealing widespread metastatic disease versus  fulminant infection in setting of advanced dementia and severe cachexia. Recommend continue Palliative care following.Recommend hospice care and comfort care.  "   "

## 2020-03-10 NOTE — PROGRESS NOTES
I found two family members on bed side, and explained to them the CT Chest, Abd and Pelvis findings as well as the impressions of Heme/Onc, GI and ID.   Palliative care with hospice, for the lesions concerning for metastatic cancer. Heme does not recommend biopsy of the lesion as this wont change the treatment plan.  Both family members agreeable to the hospice plan.

## 2020-03-10 NOTE — PROGRESS NOTES
"Patient family refused AM lab draw.   Patient family refused bladder scan stating "she only drink 2 cups of water".     "

## 2020-03-10 NOTE — CONSULTS
[facility]  Infectious Disease  Consult Note    Patient Name: Amena Wick  MRN: [id number]  Admission Date: 3/4/2020  Hospital Length of Stay: 6 days  Attending Physician: Eva Marte MD  Primary Care Provider: Thai Her MD     Isolation Status: Airborne      Inpatient consult to Infectious Diseases  Consult performed by: Salma Silverio MD  Consult ordered by: Makenzie Mckeon MD      see consult note from 3/9

## 2020-03-10 NOTE — PROGRESS NOTES
"[facility] Medicine  Progress Note    Patient Name: Amena Wick  MRN: [id number]  Patient Class: IP- Inpatient   Admission Date: 3/4/2020  Length of Stay: 5 days  Attending Physician: Orquidea Garcia MD  Primary Care Provider: Thai Her MD        Subjective:     Principal Problem:Symptomatic anemia        HPI:  Amena Nash is a 88 YOCF with PMH of dementia who presented to the ED via EMS accompanied by her niece c/o pallor, fatigue and SOB when the pt woke up. . Accordign to the history obtained in the ER , the pt's niece reported several sick contacts at home. Per EMS, the pt presented with an O2 sat of 88% on RA, which improved to 92% on 2L. The pt denies fever, rhinorrhea, abdominal pain and chest pain. She does not use supplemental O2 at home. She does not smoke cigarettes, consume EtOH or use illicit drugs. Further hx is limited due to dementia. No prior tx.      At the time of my examination the pt reported no symptoms, provided the above account and reported that her niece noticed unusual color/pallor on my face  She denied any SOB, palptitations but has recently noticed increased weakness.        Review of patient's allergies indicates:  No Known Allergies    Overview/Hospital Course:  S/P One U of PRBC with subsequent improvement in HnH to 8 from 6.9.     3/5/2020  Improved hemoglobin  Continue to have thrombocytopenia  Niece is on the bed side and although is not POA, refused to be treated for suspected blood dyscrasiaS.  The niece also suspects "Vampires" after her.   Continue to have shortness of breath on minimal exertion    3/5/2020  Baseline dementia  Concerns for DIC, liver disease   Ordered ID work up and liver US     3/6/2020  Seen by Heme/Onc; Suspect DIC,ordered work up   Niece not in agreement for more blood work  The pt has a sister who recommends all her care gone through the niece who is a retired nurse.     3/7/2020  Continue to have oxygen " requirement at 2 LPM   Continue on ASA and Lasix (For pulmonary congestion)  3/8/2020  The pt is demented  Sleepy this AM  Spoke with the family member   They want to do investigations and treat her  GI consulted  CT ABD/PELVIS WIth contrast to order   End stage dementia    3/9/2020  Pt seen this AM   No family members on bed side  The pt waking up with verbal stimuli   Withdrawn   The CT Chest/abd/pelvis with cystic lesions in the mediastinal lymph nodes, liver and spleen      Interval History: Pt is asleep, awakend, opening eyes spontaneously.       Review of Systems   Unable to perform ROS: Mental status change   Constitutional: Negative for activity change and fatigue.   HENT: Negative.    Eyes: Negative.    Respiratory: Negative.    Endocrine: Negative.    Genitourinary: Negative.    Neurological: Negative for facial asymmetry, speech difficulty and weakness.   Psychiatric/Behavioral: Negative for behavioral problems and decreased concentration.     Objective:     Vital Signs (Most Recent):  Temp: 96.9 °F (36.1 °C) (03/09/20 1510)  Pulse: 84 (03/09/20 1510)  Resp: 16 (03/09/20 1510)  BP: (!) 112/58 (03/09/20 1510)  SpO2: 95 % (03/09/20 1510) Vital Signs (24h Range):  Temp:  [96.9 °F (36.1 °C)-99 °F (37.2 °C)] 96.9 °F (36.1 °C)  Pulse:  [70-88] 84  Resp:  [16-20] 16  SpO2:  [92 %-98 %] 95 %  BP: (108-128)/(54-75) 112/58     Weight: 50.2 kg (110 lb 10.7 oz)  Body mass index is 20.24 kg/m².  No intake or output data in the 24 hours ending 03/09/20 1957   Physical Exam   Constitutional:   Malnourished   HENT:   Head: Normocephalic and atraumatic.   Left Ear: External ear normal.   Nose: Nose normal.   Eyes: Pupils are equal, round, and reactive to light. Conjunctivae and EOM are normal.   Neck: Normal range of motion. Neck supple. No JVD present. No thyromegaly present.   Cardiovascular: Normal rate, regular rhythm and normal heart sounds.   Pulmonary/Chest: Effort normal and breath sounds normal.   Abdominal:  "Soft. Bowel sounds are normal. She exhibits no distension and no mass. There is no guarding. No hernia.   Musculoskeletal: Normal range of motion. She exhibits no edema, tenderness or deformity.   Lymphadenopathy:     She has no cervical adenopathy.   Neurological: She is alert.   Advanced dementia  Sleepy, withdrwan on awakening     Psychiatric:   Advanced dementia  Withdrawn today   More sleepy    Nursing note and vitals reviewed.      Significant Labs:   BMP:   Recent Labs   Lab 03/09/20  0336   *      K 4.2      CO2 29   BUN 17   CREATININE 0.8   CALCIUM 7.7*     CBC:   Recent Labs   Lab 03/08/20  0649 03/09/20  0336   WBC 7.63 7.03   HGB 8.1* 7.8*   HCT 25.5* 25.0*   PLT 59* 33*       Significant Imaging: I have reviewed all pertinent imaging results/findings within the past 24 hours.      Assessment/Plan:      * Symptomatic anemia    -S/P transfusion x 1 Iin the ED, and one ON 3/5/2020- Improved HnH but then dropped  -Suspect chronic slow ooze from GI  vs DIC vs malnutrition with generalized vitamin deficiency, including Vit K   -Stool for occult blood  -Follow up on the HnH, if continue to loose blood, will need to transfuse  -GI consulted, recommend US live, ordered already.       3/9/2020  Suspect loss, or sequestration   CT chest /ab pelvis WITH extensive abnormal findings   Mediastinal adenopathies with cystic lesiosn  Cystic liver and spleen lesions  ". Numerous hypodense nodules throughout the enlarged liver and the spleen either representing metastatic disease or splenic and hepatic complex cysts.  Ascites.  4. Right renal cyst.  5. Question lytic lesions in the sacral ala bilaterally as above."     Heme/Onc and GI both on board, GI does not recommend endoscopy considering improperly addressed goals of care.   Heme/Onc recommended palliative and hospice care.   "From an oncology standpoint, do not plan to pursue tissue biopsy for diagnostic confirmation as it would not alter " "management . Patient with  CT imaging revealing widespread metastatic disease versus  fulminant infection in setting of advanced dementia and severe cachexia. Recommend continue Palliative care following.Recommend hospice care and comfort care.  "     Liver dysfunction  -UA positive for leucocytosis and many bacteria  -Follow the blood cultures  -Started on ceftriaxone today   -However UTI as the cause is unlikely  -Elevated INR, Low Fibrinogen level ; suspect for manifestation of generalized malnutrition.       Coagulopathy  Suspect nutritional def including Vit K       Elevated liver enzymes  -Will check the viral panel.   -Also will do liver US; niece refuse further testing   -Elevated DIC markers could be in the setting of liver injury,or general nutritional def.           Thrombocytopenia  -S/P Transfusion of platelets in the setting of bruising and suspected mucosal bleeding  -Platelets still low, with drop in HnH despite two units of RBC transfusion, suspect a slow ooze,   -Follow up on the platelets level on daily level and transfuse as needed   -Continue to follow,will transfuse to keep at appropriate level as per Heme/onc recommendations.         Palliative care encounter  Concerns for goals of care, POA  Continue to transfuse RBC and platelets when needed  Will complete the work up to find the find the final diagnosis for her symptomatic anemia and thrombocytopenia.       Dementia with behavioral disturbance  - Stable, chronic  -Continue Delirium precautions.   -Follow psych eval, appreciate psych eval. Evidence of UTI exists, will start on Ceftriaxone, and will follow the clinical status.   -The niece is concerned about the hemoglobin, and thinks "Vampires" are possibly collecting her blood.       3/7/2020  Stable, no evidence of psychomotor agitation  Continue to be disoriented    3/8/2020  Stable   3/9/2020  More drowsy, more withdrawn   Did not eat much yesterday           VTE Risk Mitigation (From " admission, onward)         Ordered     IP VTE HIGH RISK PATIENT  Once      03/04/20 1811     Place sequential compression device  Until discontinued      03/04/20 1811     Place MERLY hose  Until discontinued      03/04/20 1811                      Orquidea Garcia MD  Department of Hospital Medicine   [facility]

## 2020-03-10 NOTE — PHYSICIAN QUERY
PT Name: Amena Wick  MR #: [id number]    Physician Query Form - Nutrition Clarification     CDS/: Gaby Arechiga               Contact information: [email or web address]      This form is a permanent document in the medical record.     Query Date: March 10, 2020    By submitting this query, we are merely seeking further clarification of documentation.. Please utilize your independent clinical judgment when addressing the question(s) below.    The Medical record contains the following:   Indicators  Supporting Clinical Findings Location in Medical Record   x % of Estimated Energy Intake over a time frame from p.o., TF, or TPN Energy Intake: <50% of estimated energy requirement for 1 week or greater RD consult 3/9   x Weight Status over a time frame Weight Loss: 11% x 1.33 years  RD consult 3/9   x Subcutaneous Fat and/or Muscle Loss Body Fat Depletion: severe depletion of orbitals and triceps   Muscle Mass Depletion: severe depletion of temples and clavicle region  RD consult 3/9    Fluid Accumulation or Edema      Reduced  Strength     x Wt / BMI / Usual Body Weight Weight: 50.2 kg (110 lb 10.7 oz)  BMI (Calculated): 20.2 RD consult 3/9    Delayed Wound Healing / Failure to Thrive     x Acute or Chronic Illness Diagnosis: other (see comments)(symptomatic anemia)  Relevant Medical History: Dementia, symptomatic anemia, assistance with ADL RD consult 3/9    Medication     x Treatment Recommendation:   1. Continue current regular diet order as tolerated.   2. Continue to provide Boost Pudding TID and Beneprotein TID.   3. Encourage intake of meals and supplements.   4. If pt refuses Beneprotein consider addition of Boost Plus TID.  RD consult 3/9   x Other Severe Protein-Calorie Malnutrition  Malnutrition in the context of Chronic Illness/Injury RD consult 3/9     AND / JEANNIE Clinical Characteristics (October 2011)  A minimum of two characteristics is recommended for diagnosing either moderate or severe  malnutrition   Mild Malnutrition Moderate Malnutrition Severe Malnutrition   Energy Intake from p.o., TF or TPN. < 75% intake of estimated energy needs for less than 7 days < 75% intake of estimated energy needs for greater than 7 days < 50% intake of estimated energy needs for > 5 days   Weight Loss 1-2% in 1 month  5% in 3 months  7.5% in 6 months  10% in 1 year 1-2 % in 1 week  5% in 1 month  7.5% in 3 months  10% in 6 months  20% in 1 year > 2% in 1 week  > 5% in 1 month  > 7.5% in 3 months  > 10% in 6 months  > 20% in 1 year   Physical Findings     None *Mild subcutaneous fat and/or muscle loss  *Mild fluid accumulation  *Stage II decubitus  *Surgical wound or non-healing wound *Mod/severe subcutaneous fat and/or muscle loss  *Mod/severe fluid accumulation  *Stage III or IV decubitus  *Non-healing surgical wound     Provider, please specify diagnosis or diagnoses associated with above clinical findings.    [ x ] Severe Protein-Calorie Malnutrition   [  ] Other Nutritional Diagnosis (please specify):    [  ] Other:    [  ] Clinically Undetermined       Please document in your progress notes daily for the duration of treatment until resolved and include in your discharge summary.

## 2020-03-10 NOTE — PLAN OF CARE
Problem: End-of-Life Care  Goal: Comfort, Peace and Preserved Dignity  Outcome: Ongoing, Progressing  Intervention: Promote Physical Comfort  Flowsheets (Taken 3/10/2020 1418)  Sensory Stimulation Regulation: care clustered  Airway/Ventilation Support: comfort measures provided  Environmental Support: calm environment promoted; caregiver consistency promoted; rest periods encouraged

## 2020-03-10 NOTE — NURSING
Bedside shift report received from STACIA Sears. Communication board has been updated. NAD noted. Will continue to monitor. Pt is on airborne precautions.

## 2020-03-11 VITALS
SYSTOLIC BLOOD PRESSURE: 119 MMHG | OXYGEN SATURATION: 97 % | HEIGHT: 62 IN | RESPIRATION RATE: 18 BRPM | TEMPERATURE: 98 F | HEART RATE: 84 BPM | WEIGHT: 117.06 LBS | BODY MASS INDEX: 21.54 KG/M2 | DIASTOLIC BLOOD PRESSURE: 61 MMHG

## 2020-03-11 LAB
ANTI SM ANTIBODY: 0.07 RATIO (ref 0–0.99)
ANTI SM/RNP ANTIBODY: 0.17 RATIO (ref 0–0.99)
ANTI-SM INTERPRETATION: NEGATIVE
ANTI-SM/RNP INTERPRETATION: NEGATIVE
ANTI-SSA ANTIBODY: 0.05 RATIO (ref 0–0.99)
ANTI-SSA INTERPRETATION: NEGATIVE
ANTI-SSB ANTIBODY: 0.07 RATIO (ref 0–0.99)
ANTI-SSB INTERPRETATION: NEGATIVE
BACTERIA BLD CULT: NORMAL
BACTERIA BLD CULT: NORMAL
BACTERIA SPEC AEROBE CULT: NORMAL
DSDNA AB SER-ACNC: NORMAL [IU]/ML
GRAM STN SPEC: NORMAL
POCT GLUCOSE: 136 MG/DL (ref 70–110)

## 2020-03-11 NOTE — PLAN OF CARE
03/11/20 1129   Discharge Reassessment   Assessment Type Discharge Planning Reassessment   NPEsthela called Sheila at [facility] per family request.  Sheila to visit patient today.    1300:  Per Sheila patient ready for transport home.    1345:  ADT 30 order placed for Stretcher Transportation.  Requested  time:  1500  (Nurselisandro notified)  If transportation does not arrive at ETA time nurse will be instructed to follow protocol for transportation below:   How can I get in touch directly with dispatch, if needed?                 · Non-emergent (stretcher): [phone]    · Emergent dispatch: [phone]    ++NURSING:  If Stretcher does not arrive at requested time please call the above Non Emergent Dispatcher.  If issue not resolved please escalate to your charge nurse for further instructions.

## 2020-03-11 NOTE — PLAN OF CARE
[facility]  Department of Hospital Medicine  [address] [place] [address]  [phone] after hours  [phone] fax    HOSPICE  ORDERS    03/11/2020    Admit to Hospice:  Home Service   Diagnoses:   Active Hospital Problems    Diagnosis  POA    *Symptomatic anemia [D64.9]  Yes    Severe malnutrition [E43]  Yes    Lymphadenopathy [R59.1]  No    Abnormal CT of the abdomen [R93.5]  No    Acute respiratory failure with hypoxia and hypercarbia [J96.01, J96.02]  Yes    Elevated liver enzymes [R74.8]  Yes    Coagulopathy [D68.9]  Yes    Liver dysfunction [K76.89]  Yes    Palliative care encounter [Z51.5]  Not Applicable    Thrombocytopenia [D69.6]  Yes    Dementia with behavioral disturbance [F03.91]  Yes      Resolved Hospital Problems    Diagnosis Date Resolved POA    Hypoxia [R09.02] 03/06/2020 Unknown       Hospice Qualifying Diagnoses:          Patient has a life expectancy < 6 months due to:  1) Primary Hospice Diagnosis:  Metastatic lung cancer, advanced dementia with behavioral disturbance  2) Comorbid Conditions Contributing to Decline:    Oxygen dependent, bed bound, poor oral intake, severe anemia     Vital Signs: Routine per Hospice Protocol.    Code Status: DNR    Allergies: Review of patient's allergies indicates:  No Known Allergies    Diet: regular as tolerated   Activities: As tolerated    Nursing: Per Hospice Routine.    Pablo Care: Empty Pablo bag Q shift and PRN.  Change Pablo every month.    Routine Skin for Bedridden Patients: Apply moisture barrier cream to all skin folds and   wet areas in perineal area daily and after baths and all bowel movements.    Oxygen: comfort as needed    Other Miscellaneous Care:   Medications:   Per hospice    Amena Wick   Home Medication Instructions KINGS:[id number]    Printed on:03/11/20 1311   Medication Information                                                             Future Orders:  Hospice  Medical Director may dictate new orders for comfortable care measures & sign death certificate.        _________________________________  Eva Marte MD  03/11/2020

## 2020-03-11 NOTE — NURSING
Patient bedside report has been completed and given to STACIA York. Chart check has been completed. NAD noted. Pt on airborne precautions. Pt will go home on hospice tomorrow. IV removed and nursing communication order to keep IV out per Dr. Marte.

## 2020-03-11 NOTE — PLAN OF CARE
Patients daughter refused Quantiferon Gold test to R/O TB because 4 tubes would have to be drawn  She is now requesting a TB skin test be done. Informed her that I would request the day shift nurse notify her Dr jorge am of her request. Otherwise restful hours- patient remained free of pain and was continent of urine and stool- assisted to [facility] each time with one person assist.  Problem: Fall Injury Risk  Goal: Absence of Fall and Fall-Related Injury  Outcome: Ongoing, Progressing  Intervention: Identify and Manage Contributors to Fall Injury Risk  Flowsheets (Taken 3/11/2020 0609)  Self-Care Promotion: independence encouraged; BADL personal objects within reach; BADL personal routines maintained  Medication Review/Management: medications reviewed     Problem: Skin Injury Risk Increased  Goal: Skin Health and Integrity  Outcome: Ongoing, Progressing  Intervention: Optimize Skin Protection  Flowsheets (Taken 3/11/2020 0609)  Pressure Reduction Techniques: frequent weight shift encouraged; rest period provided between sit times; weight shift assistance provided  Skin Protection: adhesive use limited; electrode sites changed; incontinence pads utilized; skin sealant/moisture barrier applied  Head of Bed (HOB): HOB at 20-30 degrees  Intervention: Promote and Optimize Oral Intake  Flowsheets (Taken 3/11/2020 0609)  Oral Nutrition Promotion: calorie dense foods provided; calorie dense liquids provided; safe use of adaptive equipment encouraged; social interaction promoted; rest periods promoted     Problem: Coping Ineffective  Goal: Effective Coping  Outcome: Ongoing, Progressing  Intervention: Support and Enhance Coping Strategies  Flowsheets (Taken 3/11/2020 0609)  Supportive Measures: active listening utilized; decision-making supported; positive reinforcement provided; relaxation techniques promoted; self-care encouraged; verbalization of feelings encouraged  Family/Support System Care: caregiver stress acknowledged;  family care conference arranged; involvement promoted; presence promoted  Environmental Support: calm environment promoted; caregiver consistency promoted; environmental consistency promoted; rest periods encouraged     Problem: Infection  Goal: Infection Symptom Resolution  Outcome: Ongoing, Progressing  Intervention: Prevent or Manage Infection  Flowsheets (Taken 3/11/2020 0609)  Fever Reduction/Comfort Measures: lightweight bedding; lightweight clothing  Infection Management: aseptic technique maintained  Isolation Precautions: airborne precautions maintained     Problem: Malnutrition  Goal: Improved Nutritional Intake  Outcome: Ongoing, Progressing  Intervention: Promote and Optimize Oral Intake  Flowsheets (Taken 3/11/2020 0609)  Oral Nutrition Promotion: calorie dense foods provided; calorie dense liquids provided; safe use of adaptive equipment encouraged; social interaction promoted; rest periods promoted  Intervention: Promote and Optimize Nutrition Support  Flowsheets (Taken 3/11/2020 0609)  Nutrition Support Management: weight trending reviewed     Problem: End-of-Life Care  Goal: Comfort, Peace and Preserved Dignity  Outcome: Ongoing, Progressing  Intervention: Promote Physical Comfort  Flowsheets (Taken 3/11/2020 0609)  Sensory Stimulation Regulation: care clustered; lighting decreased; quiet environment promoted  Airway/Ventilation Support: comfort measures provided  Environmental Support: calm environment promoted; caregiver consistency promoted; environmental consistency promoted; rest periods encouraged  Intervention: Promote Peace and Maintain Dignity  Flowsheets (Taken 3/11/2020 0609)  Spiritual Activities Assistance: affirmation provided  Supportive Measures: active listening utilized; decision-making supported; positive reinforcement provided; relaxation techniques promoted; self-care encouraged; verbalization of feelings encouraged  Intervention: Support the Grieving Process  Flowsheets (Taken  3/11/2020 0609)  Family/Support System Care: caregiver stress acknowledged; family care conference arranged; involvement promoted; presence promoted  Life Transition/Adjustment: advance care planning facilitated; palliative care discussed

## 2020-03-11 NOTE — NURSING
Bedside shift report received from STACIA York. Communication board has been updated. NAD noted. Will continue to monitor. Airborne precautions in place. Family still refusing labs and ready to take family home.

## 2020-03-11 NOTE — PROGRESS NOTES
"[facility]  Adult Nutrition  Progress Note    SUMMARY       Recommendations     1. Encourage po intake/boost as able   2. Appetite stimulant if appropriate    Goals: 1. Pt PO intake >/= 75% EEN/EPN daily.  Nutrition Goal Status: goal not met  Communication of RD Recs: other (comment)(POC)    Reason for Assessment    Reason For Assessment: RD follow-up  Diagnosis: other (see comments)(symptomatic anemia)  Relevant Medical History: Dementia, symptomatic anemia, assistance with ADL  Interdisciplinary Rounds: did not attend    General Information Comments: Family decided against future tests or procedures. Plan for hospice. + mets cancer. Limited NFPE as pt does not want to be touched. Pt with obvious muscle wasting, subcutaneous fat loss. Pt with poor intake, receiving supplemental pudding.     Nutrition Discharge Planning: d/c on high calorie/high protein diet with ONS supplementation as tolerated    Nutrition Risk Screen    Nutrition Risk Screen: no indicators present    Nutrition/Diet History    Food Preferences: no cultural or spiritual preferences identified  Spiritual, Cultural Beliefs, Sabianist Practices, Values that Affect Care: no  Food Allergies: NKFA  Factors Affecting Nutritional Intake: decreased appetite, early satiety, other (see comments)(dementia)    Anthropometrics    Temp: 98 °F (36.7 °C)  Height Method: Estimated  Height: 5' 2" (157.5 cm)  Height (inches): 62 in  Weight Method: Bed Scale  Weight: 53.1 kg (117 lb 1 oz)  Weight (lb): 117.07 lb  Ideal Body Weight (IBW), Female: 110 lb  % Ideal Body Weight, Female (lb): 100.61 %  BMI (Calculated): 21.4  BMI Grade: 18.5-24.9 - normal       Lab/Procedures/Meds    Pertinent Labs Reviewed: reviewed  Pertinent Labs Comments: alb 2.5  Pertinent Medications Reviewed: reviewed  Pertinent Medications Comments: KCL, pantoprazole    Estimated/Assessed Needs    Weight Used For Calorie Calculations: 50.2 kg (110 lb 10.7 oz)  Energy Calorie " Requirements (kcal): 1144 kcal (> for repletion)  Energy Need Method: [place]-St Dom  Protein Requirements: 60-70g  Weight Used For Protein Calculations: 50.2 kg (110 lb 10.7 oz)     Estimated Fluid Requirement Method: RDA Method(or Per MD)  RDA Method (mL): 1144         Nutrition Prescription Ordered    Current Diet Order: Regular   Oral Nutrition Supplement: Boost Pudding TID, Beneprotein TID    Evaluation of Received Nutrient/Fluid Intake    I/O: N/A  Energy Calories Required: not meeting needs  Protein Required: not meeting needs  Fluid Required: (per MD)  Comments: LBM: 3/11  Tolerance: (1 x week; home on hospice)  % Intake of Estimated Energy Needs: 50 %  % Meal Intake: 25 - 50 % + boost pudding    Nutrition Risk    Level of Risk/Frequency of Follow-up: (2 x/week)     Assessment and Plan    Severe malnutrition  Nutrition Problem:  Severe Protein-Calorie Malnutrition  Malnutrition in the context of Chronic Illness/Injury    Related to (etiology):  dementia    Signs and Symptoms (as evidenced by):  Energy Intake: <50% of estimated energy requirement for 1 week or greater  Body Fat Depletion: severe depletion of orbitals and triceps   Muscle Mass Depletion: severe depletion of temples and clavicle region   Weight Loss: 11% x 1.33 years   Fluid Accumulation: none noted    Interventions(treatment strategy):  Collaboration with other providers  Commercial Food/Beverage  Nutrition Supplementation    Nutrition Diagnosis Status:  New           Monitor and Evaluation    Food and Nutrient Intake: energy intake, food and beverage intake  Food and Nutrient Adminstration: diet order  Knowledge/Beliefs/Attitudes: food and nutrition knowledge/skill  Physical Activity and Function: nutrition-related ADLs and IADLs  Anthropometric Measurements: weight, weight change, body mass index  Biochemical Data, Medical Tests and Procedures: electrolyte and renal panel, gastrointestinal profile, glucose/endocrine profile, inflammatory  profile, lipid profile  Nutrition-Focused Physical Findings: overall appearance     Malnutrition Assessment  Energy Intake: severe energy intake          Energy Intake (Malnutrition): less than or equal to 50% for greater than or equal to 5 days  Subcutaneous Fat (Malnutrition): severe depletion  Muscle Mass (Malnutrition): severe depletion   Orbital Region (Subcutaneous Fat Loss): severe depletion  Upper Arm Region (Subcutaneous Fat Loss): severe depletion   Yazidism Region (Muscle Loss): severe depletion  Clavicle Bone Region (Muscle Loss): severe depletion  Clavicle and Acromion Bone Region (Muscle Loss): severe depletion   Edema (Fluid Accumulation): 0-->no edema present   Subcutaneous Fat Loss (Final Summary): severe protein-calorie malnutrition  Muscle Loss Evaluation (Final Summary): severe protein-calorie malnutrition         Nutrition Follow-Up    RD Follow-up?: Yes

## 2020-03-11 NOTE — NURSING
Dr Valencia notified patients niece requesting Quantitative lab to be drawn to R/O TB. She states that they didn't want other labs done yesterday, but they want to know if she actually has TB. She also states that they were unaware that the patient was scheduled for the test yesterday morning. Received order from Dr Valencia to obtain test this am- family notified.

## 2020-03-11 NOTE — PLAN OF CARE
Recommendations      1. Encourage po intake/boost as able   2. Appetite stimulant if appropriate     Goals: 1. Pt PO intake >/= 75% EEN/EPN daily.  Nutrition Goal Status: goal not met  Communication of RD Recs: other (comment)(POC)

## 2020-03-12 LAB — PHYTONADIONE SERPL-MCNC: 2.14 NMOL/L (ref 0.22–4.88)

## 2020-03-13 NOTE — DISCHARGE SUMMARY
"[facility] Medicine  Discharge Summary      Patient Name: Amena Wick  MRN: [id number]  Admission Date: 3/4/2020  Hospital Length of Stay: 7 days  Discharge Date and Time:3/11/2020  Attending Physician: No att. providers found   Discharging Provider: Eva Marte MD  Primary Care Provider: Thai Her MD      HPI:   Amena Nash is a 88 YOCF with PMH of dementia who presented to the ED via EMS accompanied by her niece c/o pallor, fatigue and SOB when the pt woke up. . Accordign to the history obtained in the ER , the pt's niece reported several sick contacts at home. Per EMS, the pt presented with an O2 sat of 88% on RA, which improved to 92% on 2L. The pt denies fever, rhinorrhea, abdominal pain and chest pain. She does not use supplemental O2 at home. She does not smoke cigarettes, consume EtOH or use illicit drugs. Further hx is limited due to dementia. No prior tx.      At the time of my examination the pt reported no symptoms, provided the above account and reported that her niece noticed unusual color/pallor on my face  She denied any SOB, palptitations but has recently noticed increased weakness.        Review of patient's allergies indicates:  No Known Allergies    * No surgery found *      Hospital Course:   S/P One U of PRBC with subsequent improvement in HnH to 8 from 6.9.     3/5/2020  Improved hemoglobin  Continue to have thrombocytopenia  Niece is on the bed side and although is not POA, refused to be treated for suspected blood dyscrasiaS.  The niece also suspects "Vampires" after her.   Continue to have shortness of breath on minimal exertion    3/5/2020  Baseline dementia  Concerns for DIC, liver disease   Ordered ID work up and liver US     3/6/2020  Seen by Heme/Onc; Suspect DIC,ordered work up   Niece not in agreement for more blood work  The pt has a sister who recommends all her care gone through the niece who is a retired nurse. "     3/7/2020  Continue to have oxygen requirement at 2 LPM   Continue on ASA and Lasix (For pulmonary congestion)  3/8/2020  The pt is demented  Sleepy this AM  Spoke with the family member   They want to do investigations and treat her  GI consulted  CT ABD/PELVIS WIth contrast to order   End stage dementia    3/9/2020  Pt seen this AM   No family members on bed side  The pt waking up with verbal stimuli   Withdrawn   The CT Chest/abd/pelvis with cystic lesions in the mediastinal lymph nodes, liver and spleen    3/10- pt and family met with palliative care today- pt is DNR.  Dc home tomorrow with home hospice/[facility]   3/11 - pt dc'd with hospice        Consults:   Consults (From admission, onward)        Status Ordering Provider     Inpatient consult to Gastroenterology  Once     Provider:  Maranda Garcia MD    Completed GILBERT COLINDRES     Inpatient consult to Gastroenterology  Once     Provider:  Casa Adkins MD    Completed BRYAN GARCIA     Inpatient consult to Hematology  Once     Provider:  (Not yet assigned)    Completed BRYAN GARCIA     Inpatient consult to Infectious Diseases  Once     Provider:  Salma Silverio MD    Completed ERENDIRA JOYA     Inpatient consult to Infectious Diseases  Once     Provider:  Salma Silverio MD    Completed BRYAN GARCIA     Inpatient consult to Palliative Care  Once     Provider:  Esthela Farrell NP    Completed BRYAN GARCIA     Inpatient consult to Psychiatry  Once     Provider:  Sd Lambert MD    Completed BRYAN GARCIA     Inpatient consult to Registered Dietitian/Nutritionist  Once     Provider:  (Not yet assigned)    Completed BRYAN GARCIA     Inpatient consult to Spiritual Care  Once     Provider:  (Not yet assigned)    Completed BRYAN GARCIA     IP consult to case management  Once     Provider:  (Not yet assigned)    Completed GILBERT COLINDRES          No new Assessment & Plan notes have been filed under this hospital service since the last  note was generated.  Service: Hospital Medicine    Final Active Diagnoses:    Diagnosis Date Noted POA    PRINCIPAL PROBLEM:  Symptomatic anemia [D64.9] 03/04/2020 Yes    Severe malnutrition [E43] 03/09/2020 Yes    Lymphadenopathy [R59.1] 03/09/2020 No    Abnormal CT of the abdomen [R93.5] 03/09/2020 No    Acute respiratory failure with hypoxia and hypercarbia [J96.01, J96.02] 03/08/2020 Yes    Elevated liver enzymes [R74.8] 03/06/2020 Yes    Coagulopathy [D68.9] 03/06/2020 Yes    Liver dysfunction [K76.89] 03/06/2020 Yes    Palliative care encounter [Z51.5] 03/05/2020 Not Applicable    Thrombocytopenia [D69.6] 03/05/2020 Yes    Dementia with behavioral disturbance [F03.91] 02/23/2020 Yes      Problems Resolved During this Admission:    Diagnosis Date Noted Date Resolved POA    Hypoxia [R09.02] 03/06/2020 03/06/2020 Unknown       Discharged Condition: good    Disposition: Hospice/Home    Follow Up:  Follow-up Information     [facility].    Specialties:  Hospice and Palliative Medicine, Hospice Services  Contact information:  [address] [facility] [address] [phone]                 Patient Instructions:      Diet Adult Regular     Activity as tolerated       Significant Diagnostic Studies:   Impression ct       1. Bilateral pleural effusions greater on the right compared to the left, associated with atelectasis or consolidations in the lung bases bilaterally.  2. Multiple well demarcated hypodense nodules in the mediastinum, likely representing cystic adenopathy.  Also in the left supraclavicular region there is a cystic appearing lesion, likely representing a necrotic nodes.  Although these could represent metastatic lymphadenopathy, in this patient with severe cachexia tuberculous adenitis can not be totally excluded.  3. Numerous hypodense nodules throughout the enlarged liver and the spleen either representing metastatic disease or splenic and hepatic complex cysts.  Ascites.  4. Right  renal cyst.  5. Question lytic lesions in the sacral ala bilaterally as above.         Pending Diagnostic Studies:     None         Medications:  Reconciled Home Medications:      Medication List      STOP taking these medications    aspirin 81 MG EC tablet  Commonly known as:  ECOTRIN     hydroCHLOROthiazide 25 MG tablet  Commonly known as:  HYDRODIURIL     multivitamin capsule            Indwelling Lines/Drains at time of discharge:   Lines/Drains/Airways     None                 Time spent on the discharge of patient: 40 minutes  Patient was seen and examined on the date of discharge and determined to be suitable for discharge.         Eva Marte MD  Department of Hospital Medicine  [facility]

## 2021-09-21 NOTE — ASSESSMENT & PLAN NOTE
-The patient has acute respiratory failure with hypoxia from unknown cause  -CXR concerning for pulmonary edema  -Pt to undergo CT chest with contrast for further evaluation  -Echo in February showed diastolic dysfunction   Hold today. Continue current Coumadin regimen.

## 2023-08-16 NOTE — SUBJECTIVE & OBJECTIVE
Interval History: Pt withdrawn and not answering questions or following commands. Palliative care NP present during visit. Patient's 2 sisters at bedside.     Oncology Treatment Plan:   [No treatment plan]    Medications:  Continuous Infusions:  Scheduled Meds:   cefTRIAXone (ROCEPHIN) IVPB  1 g Intravenous Q12H    pantoprazole  40 mg Oral Daily    potassium chloride  40 mEq Oral Once     PRN Meds:sodium chloride, sodium chloride, sodium chloride, sodium chloride, sodium chloride, sodium chloride, sodium chloride 0.9%     Review of Systems   Unable to perform ROS: Dementia     Objective:     Vital Signs (Most Recent):  Temp: 98 °F (36.7 °C) (03/09/20 1122)  Pulse: 74 (03/09/20 1122)  Resp: 16 (03/09/20 1122)  BP: (!) 108/58 (03/09/20 1122)  SpO2: (!) 93 % (03/09/20 1122) Vital Signs (24h Range):  Temp:  [97.7 °F (36.5 °C)-99.1 °F (37.3 °C)] 98 °F (36.7 °C)  Pulse:  [70-88] 74  Resp:  [16-20] 16  SpO2:  [92 %-98 %] 93 %  BP: (108-128)/(54-75) 108/58     Weight: 50.2 kg (110 lb 10.7 oz)  Body mass index is 20.24 kg/m².  Body surface area is 1.48 meters squared.    No intake or output data in the 24 hours ending 03/09/20 1306    Physical Exam   Constitutional:   Thin, cachectic   HENT:   Head: Atraumatic.   Eyes: Conjunctivae and EOM are normal. Right eye exhibits no discharge. Left eye exhibits no discharge.   Cardiovascular: Normal rate, regular rhythm and normal heart sounds. Exam reveals no gallop and no friction rub.   No murmur heard.  Pulmonary/Chest: Effort normal and breath sounds normal. No respiratory distress. She has no wheezes. She has no rales.   Abdominal: Soft. Bowel sounds are normal. She exhibits no distension. There is no tenderness. There is no rebound and no guarding.   Neurological:   withdrawn   Psychiatric:   Pt withdrawn       Significant Labs:   All pertinent labs within the past 24 hours have been reviewed    Diagnostic Results:  All pertinent labs within the past 24 hours have been  reviewed    CT c/a/p   1. Bilateral pleural effusions greater on the right compared to the left, associated with atelectasis or consolidations in the lung bases bilaterally.  2. Multiple well demarcated hypodense nodules in the mediastinum, likely representing cystic adenopathy.  Also in the left supraclavicular region there is a cystic appearing lesion, likely representing a necrotic nodes.  Although these could represent metastatic lymphadenopathy, in this patient with severe cachexia tuberculous adenitis can not be totally excluded.  3. Numerous hypodense nodules throughout the enlarged liver and the spleen either representing metastatic disease or splenic and hepatic complex cysts.  Ascites.  4. Right renal cyst.  5. Question lytic lesions in the sacral ala bilaterally as above.   Admission Reconciliation is Not Complete  Discharge Reconciliation is Not Complete Admission Reconciliation is Not Complete  Discharge Reconciliation is Completed